# Patient Record
Sex: FEMALE | Race: WHITE | NOT HISPANIC OR LATINO | Employment: OTHER | URBAN - METROPOLITAN AREA
[De-identification: names, ages, dates, MRNs, and addresses within clinical notes are randomized per-mention and may not be internally consistent; named-entity substitution may affect disease eponyms.]

---

## 2017-07-25 ENCOUNTER — THERAPY VISIT (OUTPATIENT)
Dept: PHYSICAL THERAPY | Facility: CLINIC | Age: 82
End: 2017-07-25
Payer: MEDICARE

## 2017-07-25 DIAGNOSIS — M25.552 HIP PAIN, LEFT: Primary | ICD-10-CM

## 2017-07-25 PROCEDURE — 97110 THERAPEUTIC EXERCISES: CPT | Mod: GP | Performed by: PHYSICAL THERAPIST

## 2017-07-25 PROCEDURE — 97161 PT EVAL LOW COMPLEX 20 MIN: CPT | Mod: GP | Performed by: PHYSICAL THERAPIST

## 2017-07-25 PROCEDURE — 97530 THERAPEUTIC ACTIVITIES: CPT | Mod: GP | Performed by: PHYSICAL THERAPIST

## 2017-07-25 PROCEDURE — G8979 MOBILITY GOAL STATUS: HCPCS | Mod: GP | Performed by: PHYSICAL THERAPIST

## 2017-07-25 PROCEDURE — G8978 MOBILITY CURRENT STATUS: HCPCS | Mod: GP | Performed by: PHYSICAL THERAPIST

## 2017-07-25 NOTE — MR AVS SNAPSHOT
"              After Visit Summary   7/25/2017    Susan Fair    MRN: [id number]           Patient Information     Date Of Birth          10/11/1928        Visit Information        Provider Department      7/25/2017 10:10 AM Alissa Lo PT [facility]        Today's Diagnoses     Hip pain, left    -  1       Follow-ups after your visit        Your next 10 appointments already scheduled     Aug 01, 2017 10:10 AM CDT   KERON Extremity with Alissa Lo PT   [facility] ([facility]  )    [address] [place] [id number] [phone]            Aug 08, 2017 10:10 AM CDT   KERON Extremity with Alissa Lo PT   [facility] ([address] [address] [phone]              Who to contact     If you have questions or need follow up information about today's clinic visit or your schedule please contact [facility] PHYSICAL THERAPY directly at [phone].  Normal or non-critical lab and imaging results will be communicated to you by MyChart, letter or phone within 4 business days after the clinic has received the results. If you do not hear from us within 7 days, please contact the clinic through Petpacehart or phone. If you have a critical or abnormal lab result, we will notify you by phone as soon as possible.  Submit refill requests through Experiment or call your pharmacy and they will forward the refill request to us. Please allow 3 business days for your refill to be completed.          Additional Information About Your Visit        MyChart Information     Experiment lets you send messages to your doctor, view your test results, renew your prescriptions, schedule appointments and more. To sign up, go to [email or web address] . Click on \"Log in\" on the left side of the " "screen, which will take you to the Welcome page. Then click on \"Sign up Now\" on the right side of the page.     You will be asked to enter the access code listed below, as well as some personal information. Please follow the directions to create your username and password.     Your access code is: [email or web address]  Expires: 10/23/2017  2:45 PM     Your access code will  in 90 days. If you need help or a new code, please call your [place] clinic or [phone].        Care EveryWhere ID     This is your Care EveryWhere ID. This could be used by other organizations to access your [place] medical records  [id number]         Blood Pressure from Last 3 Encounters:   No data found for BP    Weight from Last 3 Encounters:   No data found for Wt              We Performed the Following     KERON CERT REPORT     KERON Inital Eval Report     PT Eval, Low Complexity (30868)     Therapeutic Activities     Therapeutic Exercises        Primary Care Provider    None Specified       No primary provider on file.        Equal Access to Services     [facility]: Hadii key ku iglesiao Sodel, waaxda luqadaha, qaybta kaalmada adeegyadaria, kelton wall . So [facility] [phone].    ATENCIÓN: Si habla español, tiene a finn disposición servicios gratuitos de asistencia lingüística. Llame al [phone].    We comply with applicable federal civil rights laws and [place] laws. We do not discriminate on the basis of race, color, national origin, age, disability sex, sexual orientation or gender identity.            Thank you!     Thank you for choosing [facility]  for your care. Our goal is always to provide you with excellent care. Hearing back from our patients is one way we can continue to improve our services. Please take a few minutes to complete the written survey that you may receive in the mail after your visit with us. Thank you!             Your Updated " Medication List - Protect others around you: Learn how to safely use, store and throw away your medicines at www.disposemymeds.org.      Notice  As of 7/25/2017  2:45 PM    You have not been prescribed any medications.

## 2017-07-25 NOTE — LETTER
DEPARTMENT OF HEALTH AND HUMAN SERVICES  CENTERS FOR MEDICARE & MEDICAID SERVICES    PLAN/UPDATED PLAN OF PROGRESS FOR OUTPATIENT REHABILITATION    PATIENTS NAME:  Susan Fair     : 10/11/1928    PROVIDER NUMBER:    [id number]    [id number]     PROVIDER NAME: [place] [facility]    MEDICAL RECORD NUMBER: [id number]     START OF CARE DATE:  SOC Date: 17   TYPE:  PT    PRIMARY/TREATMENT DIAGNOSIS: (Pertinent Medical Diagnosis)  Hip pain, left    VISITS FROM START OF CARE:  Rxs Used: 1     [place] [facility] Initial Evaluation    Subjective:  Patient is a 88 year old female presenting with rehab left hip hpi.   Susan Fair is a 88 year old female with a left hip condition.  Condition occurred with:  Repetition/overuse (Pt. has had an onset of L hip bursitis x 1 month w/out incident. Pt. has difficulty with stairs and prolonged amb. Pt. with a hx of polio. No hx of L hip pain.).  Condition occurred: for unknown reasons.  This is a new condition  17.    Patient reports pain:  Greater trochanter.  Radiates to:  No radiation.  Pain is described as aching and is intermittent and reported as 4/10.  Associated symptoms:  Loss of strength. Pain is the same all the time.  Symptoms are exacerbated by walking, ascending stairs and descending stairs and relieved by rest.  Since onset symptoms are gradually improving.  Special tests:  X-ray.  Previous treatment: none.    General health as reported by patient is good.   Patient is a 88 year old female presenting with rehab left hip hpi. Pertinent medical history includes:  High blood pressure, stroke, sleep disorder/apnea and overweight.  Medical allergies: no.  Other surgeries include:  None reported.  Current medications:  High blood pressure medication.  Current occupation is none.               Objective:  Standing Alignment:    Hip deviations alignment: R leg shorter than L due to polio as a  child.    Gait:  Limp on R due to R LE shorter than L from childhood polio  Gait Type:  Antalgic       Flexibility/Screens:   Positive screens:  Hip    Lower Extremity:  Decreased left lower extremity flexibility:Hip Flexors        PATIENTS NAME:  Susan Fair     Hip Evaluation  Hip PROM:    Flexion: Left: 110   Right: 110  Extension: Left: 10   Right: 20  Abduction: Left: 45    Right: 50    Hip Strength:    Flexion:   Left: 4-/5   Pain:    Extension:  Left: 4-/5  Pain:  Abduction:  Left: 4-/5     Pain:    Hip Special Testing:    Left hip positive for the following special tests:  Andres  Left hip negative for the following special tests:  Fadir/Labrum      Hip Palpation:    Left hip tenderness present at:   Greater Trachanter    Assessment/Plan:    Patient is a 88 year old female with left side hip complaints.    Patient has the following significant findings with corresponding treatment plan.                Diagnosis 1:  L hip bursitis  Pain -  self management and education  Decreased ROM/flexibility - manual therapy and therapeutic exercise  Decreased strength - therapeutic exercise and therapeutic activities  Impaired muscle performance - neuro re-education  Decreased function - therapeutic activities    Therapy Evaluation Codes:   1) History comprised of:   Personal factors that impact the plan of care:      Age.    Comorbidity factors that impact the plan of care are:      None.     Medications impacting care: None.  2) Examination of Body Systems comprised of:   Body structures and functions that impact the plan of care:      Hip.   Activity limitations that impact the plan of care are:      Stairs and Walking.  3) Clinical presentation characteristics are:   Stable/Uncomplicated.  4) Decision-Making    Low complexity using standardized patient assessment instrument and/or measureable assessment of functional outcome.  Cumulative Therapy Evaluation is: Low complexity.    Previous and current functional  "limitations:  (See Goal Flow Sheet for this information)    Short term and Long term goals: (See Goal Flow Sheet for this information)     Communication ability:  Patient appears to be able to clearly communicate and understand verbal and written communication and follow directions correctly.  Treatment Explanation - The following has been discussed with the patient:   RX ordered/plan of care  Anticipated outcomes  Possible risks and side effects    PATIENTS NAME:  Susan Fair     This patient would benefit from PT intervention to resume normal activities.   Rehab potential is good.    Frequency:  1 X week, once daily  Duration:  for 4 weeks  Discharge Plan:  Achieve all LTG.  Independent in home treatment program.  Reach maximal therapeutic benefit.    Please refer to the daily flowsheet for treatment today, total treatment time and time spent performing 1:1 timed codes.     Caregiver Signature/Credentials _____________________________ Date ________       Treating Provider: mirian Lo PT   I have reviewed and certified the need for these services and plan of treatment while under my care.      PHYSICIAN'S SIGNATURE:   _________________________________________  Date___________   Magdi Izaguirre    Certification period:  Beginning of Cert date period: 07/25/17 to  End of Cert period date: 10/22/17     Functional Level Progress Report: Please see attached \"Goal Flow sheet for Functional level.\"    ____X____ Continue Services or       ________ DC Services                Service dates: From  SOC Date: 07/25/17 date to present                         "

## 2017-07-25 NOTE — PROGRESS NOTES
[facility] Initial Evaluation          Subjective:    Patient is a 88 year old female presenting with rehab left hip hpi.   Susan Fair is a 88 year old female with a left hip condition.  Condition occurred with:  Repetition/overuse (Pt. has had an onset of L hip bursitis x 1 month w/out incident. Pt. has difficulty with stairs and prolonged amb. Pt. with a hx of polio. No hx of L hip pain.).  Condition occurred: for unknown reasons.  This is a new condition  6-25-17.    Patient reports pain:  Greater trochanter.  Radiates to:  No radiation.  Pain is described as aching and is intermittent and reported as 4/10.  Associated symptoms:  Loss of strength. Pain is the same all the time.  Symptoms are exacerbated by walking, ascending stairs and descending stairs and relieved by rest.  Since onset symptoms are gradually improving.  Special tests:  X-ray.  Previous treatment: none.    General health as reported by patient is good.                                              Objective:    Standing Alignment:            Hip deviations alignment: R leg shorter than L due to polio as a child.        Gait:  Limp on R due to R LE shorter than L from childhood polio  Gait Type:  Antalgic         Flexibility/Screens:   Positive screens:  Hip    Lower Extremity:  Decreased left lower extremity flexibility:Hip Flexors                                                   Hip Evaluation  Hip PROM:    Flexion: Left: 110   Right: 110  Extension: Left: 10   Right: 20  Abduction: Left: 45    Right: 50                    Hip Strength:    Flexion:   Left: 4-/5   Pain:                      Extension:  Left: 4-/5  Pain:  Abduction:  Left: 4-/5     Pain:                  Hip Special Testing:    Left hip positive for the following special tests:  Andres  Left hip negative for the following special tests:  Fadir/Labrum      Hip Palpation:    Left hip tenderness present at:   Greater Trachanter               General      ROS    Assessment/Plan:      Patient is a 88 year old female with left side hip complaints.    Patient has the following significant findings with corresponding treatment plan.                Diagnosis 1:  L hip bursitis  Pain -  self management and education  Decreased ROM/flexibility - manual therapy and therapeutic exercise  Decreased strength - therapeutic exercise and therapeutic activities  Impaired muscle performance - neuro re-education  Decreased function - therapeutic activities    Therapy Evaluation Codes:   1) History comprised of:   Personal factors that impact the plan of care:      Age.    Comorbidity factors that impact the plan of care are:      None.     Medications impacting care: None.  2) Examination of Body Systems comprised of:   Body structures and functions that impact the plan of care:      Hip.   Activity limitations that impact the plan of care are:      Stairs and Walking.  3) Clinical presentation characteristics are:   Stable/Uncomplicated.  4) Decision-Making    Low complexity using standardized patient assessment instrument and/or measureable assessment of functional outcome.  Cumulative Therapy Evaluation is: Low complexity.    Previous and current functional limitations:  (See Goal Flow Sheet for this information)    Short term and Long term goals: (See Goal Flow Sheet for this information)     Communication ability:  Patient appears to be able to clearly communicate and understand verbal and written communication and follow directions correctly.  Treatment Explanation - The following has been discussed with the patient:   RX ordered/plan of care  Anticipated outcomes  Possible risks and side effects  This patient would benefit from PT intervention to resume normal activities.   Rehab potential is good.    Frequency:  1 X week, once daily  Duration:  for 4 weeks  Discharge Plan:  Achieve all LTG.  Independent in home treatment program.  Reach maximal therapeutic  benefit.    Please refer to the daily flowsheet for treatment today, total treatment time and time spent performing 1:1 timed codes.

## 2017-07-28 NOTE — PROGRESS NOTES
Subjective:    Patient is a 88 year old female presenting with rehab left hip hpi.                                      Pertinent medical history includes:  High blood pressure, stroke, sleep disorder/apnea and overweight.  Medical allergies: no.  Other surgeries include:  None reported.  Current medications:  High blood pressure medication.  Current occupation is none.                                    Objective:    System    Physical Exam    General     ROS    Assessment/Plan:

## 2017-08-01 ENCOUNTER — THERAPY VISIT (OUTPATIENT)
Dept: PHYSICAL THERAPY | Facility: CLINIC | Age: 82
End: 2017-08-01
Payer: MEDICARE

## 2017-08-01 DIAGNOSIS — M25.552 HIP PAIN, LEFT: ICD-10-CM

## 2017-08-01 PROCEDURE — 97530 THERAPEUTIC ACTIVITIES: CPT | Mod: GP | Performed by: PHYSICAL THERAPIST

## 2017-08-01 PROCEDURE — 97110 THERAPEUTIC EXERCISES: CPT | Mod: GP | Performed by: PHYSICAL THERAPIST

## 2017-08-01 NOTE — MR AVS SNAPSHOT
"              After Visit Summary   2017    Susan Fari    MRN: [id number]           Patient Information     Date Of Birth          10/11/1928        Visit Information        Provider Department      2017 10:10 AM Alissa Lo PT [facility]        Today's Diagnoses     Hip pain, left           Follow-ups after your visit        Who to contact     If you have questions or need follow up information about today's clinic visit or your schedule please [facility] directly at [phone].  Normal or non-critical lab and imaging results will be communicated to you by TripOvationhart, letter or phone within 4 business days after the clinic has received the results. If you do not hear from us within 7 days, please contact the clinic through TripOvationhart or phone. If you have a critical or abnormal lab result, we will notify you by phone as soon as possible.  Submit refill requests through Aiming or call your pharmacy and they will forward the refill request to us. Please allow 3 business days for your refill to be completed.          Additional Information About Your Visit        MyChart Information     Aiming lets you send messages to your doctor, view your test results, renew your prescriptions, schedule appointments and more. To sign up, go to [email or web address] . Click on \"Log in\" on the left side of the screen, which will take you to the Welcome page. Then click on \"Sign up Now\" on the right side of the page.     You will be asked to enter the access code listed below, as well as some personal information. Please follow the directions to create your username and password.     Your access code is: [email or web address]  Expires: 10/23/2017  2:45 PM     Your access code will  in 90 days. If you need help or a new code, please call your [place] clinic or [phone].        Care EveryWhere ID     This is your " Care EveryWhere ID. This could be used by other organizations to access your [place] medical records  [id number]         Blood Pressure from Last 3 Encounters:   No data found for BP    Weight from Last 3 Encounters:   No data found for Wt              We Performed the Following     Therapeutic Activities     Therapeutic Exercises        Primary Care Provider    None Specified       No primary provider on file.        Equal Access to Services     [facility] : Hadii aad ku hadasho Soomaali, waaxda luqadaha, qaybta kaalmada adealyshayada, kelton barnettdiannajacinda wall . So [facility] [phone].    ATENCIÓN: Si habla español, tiene a finn disposición servicios gratuitos de asistencia lingüística. Llame al [phone].    We comply with applicable federal civil rights laws and [place] laws. We do not discriminate on the basis of race, color, national origin, age, disability sex, sexual orientation or gender identity.            Thank you!     Thank you for choosing [place] [facility] PH[facility]ICAL THERAP[facility]  for your care. Our goal is always to provide you with excellent care. Hearing back from our patients is one way we can continue to improve our services. Please take a few minutes to complete the written survey that you may receive in the mail after your visit with us. Thank you!             Your Updated Medication List - Protect others around you: Learn how to safely use, store and throw away your medicines at www.disposemymeds.org.      Notice  As of 8/1/2017  2:38 PM    You have not been prescribed any medications.

## 2017-08-01 NOTE — PROGRESS NOTES
Subjective:    HPI                    Objective:    System    Physical Exam    General     ROS    Assessment/Plan:      SUBJECTIVE  Subjective changes as noted by pt:   Pt. notes decreasing L hip pain with walking. Stairs still are painful but she does not have to do stairs very often.    Current pain level:  3/10   Changes in function:  Yes (See Goal flowsheet attached for changes in current functional level)     Adverse reaction to treatment or activity:  None    OBJECTIVE  Changes in objective findings:  Strength L hip flex 4/5; abd 4-/5; ext 4-/5.      ASSESSMENT  Susan continues to require intervention to meet STG and LTG's: PT  Patient's symptoms are resolving.  Response to therapy has shown an improvement in  pain level, ROM  and strength  Progress made towards STG/LTG?  Yes (See Goal flowsheet attached for updates on achievement of STG and LTG)    PLAN  Current treatment program is being advanced to more complex exercises.    PTA/ATC plan:  N/A    Please refer to the daily flowsheet for treatment today, total treatment time and time spent performing 1:1 timed codes.

## 2021-05-25 ENCOUNTER — RECORDS - HEALTHEAST (OUTPATIENT)
Dept: ADMINISTRATIVE | Facility: CLINIC | Age: 86
End: 2021-05-25

## 2021-06-03 ENCOUNTER — RECORDS - HEALTHEAST (OUTPATIENT)
Dept: ADMINISTRATIVE | Facility: CLINIC | Age: 86
End: 2021-06-03

## 2024-09-30 ENCOUNTER — APPOINTMENT (OUTPATIENT)
Dept: CT IMAGING | Facility: HOSPITAL | Age: 89
End: 2024-09-30
Attending: EMERGENCY MEDICINE
Payer: COMMERCIAL

## 2024-09-30 ENCOUNTER — HOSPITAL ENCOUNTER (OUTPATIENT)
Facility: HOSPITAL | Age: 89
Setting detail: OBSERVATION
Discharge: HOME | End: 2024-10-02
Attending: EMERGENCY MEDICINE | Admitting: INTERNAL MEDICINE
Payer: MEDICARE

## 2024-09-30 ENCOUNTER — ANESTHESIA EVENT (OUTPATIENT)
Dept: SURGERY | Facility: HOSPITAL | Age: 89
End: 2024-09-30
Payer: COMMERCIAL

## 2024-09-30 DIAGNOSIS — R10.33 PERIUMBILICAL ABDOMINAL PAIN: ICD-10-CM

## 2024-09-30 DIAGNOSIS — K63.89 COLONIC MASS: ICD-10-CM

## 2024-09-30 PROBLEM — E78.00 PURE HYPERCHOLESTEROLEMIA: Status: ACTIVE | Noted: 2024-09-30

## 2024-09-30 PROBLEM — I10 ESSENTIAL HYPERTENSION: Status: ACTIVE | Noted: 2024-09-30

## 2024-09-30 LAB
ALBUMIN SERPL BCG-MCNC: 3.5 G/DL (ref 3.5–5.2)
ALBUMIN UR-MCNC: 10 MG/DL
ALP SERPL-CCNC: 75 U/L (ref 40–150)
ALT SERPL W P-5'-P-CCNC: 14 U/L (ref 0–50)
ANION GAP SERPL CALCULATED.3IONS-SCNC: 11 MMOL/L (ref 7–15)
APPEARANCE UR: CLEAR
AST SERPL W P-5'-P-CCNC: 17 U/L (ref 0–45)
BASOPHILS # BLD AUTO: 0 10E3/UL (ref 0–0.2)
BASOPHILS NFR BLD AUTO: 0 %
BILIRUB SERPL-MCNC: 0.5 MG/DL
BILIRUB UR QL STRIP: NEGATIVE
BUN SERPL-MCNC: 11.5 MG/DL (ref 8–23)
CALCIUM SERPL-MCNC: 8.6 MG/DL (ref 8.8–10.4)
CHLORIDE SERPL-SCNC: 100 MMOL/L (ref 98–107)
COLOR UR AUTO: YELLOW
CREAT SERPL-MCNC: 0.76 MG/DL (ref 0.51–0.95)
EGFRCR SERPLBLD CKD-EPI 2021: 72 ML/MIN/1.73M2
EOSINOPHIL # BLD AUTO: 0 10E3/UL (ref 0–0.7)
EOSINOPHIL NFR BLD AUTO: 0 %
ERYTHROCYTE [DISTWIDTH] IN BLOOD BY AUTOMATED COUNT: 13.9 % (ref 10–15)
GLUCOSE SERPL-MCNC: 125 MG/DL (ref 70–99)
GLUCOSE UR STRIP-MCNC: NEGATIVE MG/DL
HCO3 SERPL-SCNC: 26 MMOL/L (ref 22–29)
HCT VFR BLD AUTO: 32 % (ref 35–47)
HGB BLD-MCNC: 10.1 G/DL (ref 11.7–15.7)
HGB UR QL STRIP: NEGATIVE
IMM GRANULOCYTES # BLD: 0.1 10E3/UL
IMM GRANULOCYTES NFR BLD: 1 %
KETONES UR STRIP-MCNC: NEGATIVE MG/DL
LEUKOCYTE ESTERASE UR QL STRIP: NEGATIVE
LIPASE SERPL-CCNC: 20 U/L (ref 13–60)
LYMPHOCYTES # BLD AUTO: 1.5 10E3/UL (ref 0.8–5.3)
LYMPHOCYTES NFR BLD AUTO: 9 %
MAGNESIUM SERPL-MCNC: 2 MG/DL (ref 1.7–2.3)
MCH RBC QN AUTO: 29.1 PG (ref 26.5–33)
MCHC RBC AUTO-ENTMCNC: 31.6 G/DL (ref 31.5–36.5)
MCV RBC AUTO: 92 FL (ref 78–100)
MONOCYTES # BLD AUTO: 0.4 10E3/UL (ref 0–1.3)
MONOCYTES NFR BLD AUTO: 3 %
MUCOUS THREADS #/AREA URNS LPF: PRESENT /LPF
NEUTROPHILS # BLD AUTO: 13.8 10E3/UL (ref 1.6–8.3)
NEUTROPHILS NFR BLD AUTO: 87 %
NITRATE UR QL: NEGATIVE
NRBC # BLD AUTO: 0 10E3/UL
NRBC BLD AUTO-RTO: 0 /100
PH UR STRIP: 6.5 [PH] (ref 5–7)
PLATELET # BLD AUTO: 303 10E3/UL (ref 150–450)
POTASSIUM SERPL-SCNC: 4.2 MMOL/L (ref 3.4–5.3)
PROT SERPL-MCNC: 6.5 G/DL (ref 6.4–8.3)
RADIOLOGIST FLAGS: ABNORMAL
RBC # BLD AUTO: 3.47 10E6/UL (ref 3.8–5.2)
RBC URINE: 1 /HPF
SODIUM SERPL-SCNC: 137 MMOL/L (ref 135–145)
SP GR UR STRIP: 1.02 (ref 1–1.03)
SQUAMOUS EPITHELIAL: 2 /HPF
UROBILINOGEN UR STRIP-MCNC: <2 MG/DL
WBC # BLD AUTO: 15.9 10E3/UL (ref 4–11)
WBC URINE: 2 /HPF

## 2024-09-30 PROCEDURE — 250N000011 HC RX IP 250 OP 636: Performed by: INTERNAL MEDICINE

## 2024-09-30 PROCEDURE — 81001 URINALYSIS AUTO W/SCOPE: CPT | Performed by: EMERGENCY MEDICINE

## 2024-09-30 PROCEDURE — 83690 ASSAY OF LIPASE: CPT | Performed by: EMERGENCY MEDICINE

## 2024-09-30 PROCEDURE — 99285 EMERGENCY DEPT VISIT HI MDM: CPT | Mod: 25

## 2024-09-30 PROCEDURE — 36415 COLL VENOUS BLD VENIPUNCTURE: CPT | Performed by: EMERGENCY MEDICINE

## 2024-09-30 PROCEDURE — 99222 1ST HOSP IP/OBS MODERATE 55: CPT | Mod: AI | Performed by: INTERNAL MEDICINE

## 2024-09-30 PROCEDURE — 999N000157 HC STATISTIC RCP TIME EA 10 MIN

## 2024-09-30 PROCEDURE — 250N000013 HC RX MED GY IP 250 OP 250 PS 637: Performed by: PHYSICIAN ASSISTANT

## 2024-09-30 PROCEDURE — 258N000003 HC RX IP 258 OP 636: Performed by: EMERGENCY MEDICINE

## 2024-09-30 PROCEDURE — 96374 THER/PROPH/DIAG INJ IV PUSH: CPT

## 2024-09-30 PROCEDURE — G0378 HOSPITAL OBSERVATION PER HR: HCPCS

## 2024-09-30 PROCEDURE — 250N000011 HC RX IP 250 OP 636: Performed by: EMERGENCY MEDICINE

## 2024-09-30 PROCEDURE — 83735 ASSAY OF MAGNESIUM: CPT | Performed by: INTERNAL MEDICINE

## 2024-09-30 PROCEDURE — 80053 COMPREHEN METABOLIC PANEL: CPT | Performed by: EMERGENCY MEDICINE

## 2024-09-30 PROCEDURE — 96361 HYDRATE IV INFUSION ADD-ON: CPT

## 2024-09-30 PROCEDURE — 74177 CT ABD & PELVIS W/CONTRAST: CPT | Mod: MG

## 2024-09-30 PROCEDURE — 96375 TX/PRO/DX INJ NEW DRUG ADDON: CPT

## 2024-09-30 PROCEDURE — 85025 COMPLETE CBC W/AUTO DIFF WBC: CPT | Performed by: EMERGENCY MEDICINE

## 2024-09-30 PROCEDURE — 250N000012 HC RX MED GY IP 250 OP 636 PS 637: Performed by: INTERNAL MEDICINE

## 2024-09-30 PROCEDURE — 250N000013 HC RX MED GY IP 250 OP 250 PS 637: Performed by: INTERNAL MEDICINE

## 2024-09-30 PROCEDURE — 258N000003 HC RX IP 258 OP 636: Performed by: INTERNAL MEDICINE

## 2024-09-30 RX ORDER — MAGNESIUM CARB/ALUMINUM HYDROX 105-160MG
296 TABLET,CHEWABLE ORAL ONCE
Status: COMPLETED | OUTPATIENT
Start: 2024-10-01 | End: 2024-10-01

## 2024-09-30 RX ORDER — POLYETHYLENE GLYCOL 3350 17 G/17G
238 POWDER, FOR SOLUTION ORAL ONCE
Status: COMPLETED | OUTPATIENT
Start: 2024-09-30 | End: 2024-09-30

## 2024-09-30 RX ORDER — ACETAMINOPHEN 650 MG/1
650 SUPPOSITORY RECTAL EVERY 4 HOURS PRN
Status: DISCONTINUED | OUTPATIENT
Start: 2024-09-30 | End: 2024-10-01

## 2024-09-30 RX ORDER — FAMOTIDINE 20 MG/1
20 TABLET, FILM COATED ORAL AT BEDTIME
Status: ON HOLD | COMMUNITY

## 2024-09-30 RX ORDER — NALOXONE HYDROCHLORIDE 0.4 MG/ML
0.4 INJECTION, SOLUTION INTRAMUSCULAR; INTRAVENOUS; SUBCUTANEOUS
Status: DISCONTINUED | OUTPATIENT
Start: 2024-09-30 | End: 2024-10-02 | Stop reason: HOSPADM

## 2024-09-30 RX ORDER — LIDOCAINE 40 MG/G
CREAM TOPICAL
Status: DISCONTINUED | OUTPATIENT
Start: 2024-09-30 | End: 2024-10-02 | Stop reason: HOSPADM

## 2024-09-30 RX ORDER — PREDNISONE 5 MG/1
5 TABLET ORAL DAILY
Status: ON HOLD | COMMUNITY
Start: 2024-09-30 | End: 2024-10-06

## 2024-09-30 RX ORDER — PROCHLORPERAZINE 25 MG
12.5 SUPPOSITORY, RECTAL RECTAL EVERY 12 HOURS PRN
Status: DISCONTINUED | OUTPATIENT
Start: 2024-09-30 | End: 2024-10-02 | Stop reason: HOSPADM

## 2024-09-30 RX ORDER — AMLODIPINE BESYLATE 5 MG/1
10 TABLET ORAL DAILY
Status: DISCONTINUED | OUTPATIENT
Start: 2024-09-30 | End: 2024-10-02 | Stop reason: HOSPADM

## 2024-09-30 RX ORDER — PREDNISONE 2.5 MG/1
2.5 TABLET ORAL DAILY
Status: ON HOLD | COMMUNITY
Start: 2024-10-07 | End: 2024-10-13

## 2024-09-30 RX ORDER — MORPHINE SULFATE 4 MG/ML
4 INJECTION, SOLUTION INTRAMUSCULAR; INTRAVENOUS
Status: DISCONTINUED | OUTPATIENT
Start: 2024-09-30 | End: 2024-10-01

## 2024-09-30 RX ORDER — AMOXICILLIN 250 MG
1 CAPSULE ORAL 2 TIMES DAILY PRN
Status: DISCONTINUED | OUTPATIENT
Start: 2024-09-30 | End: 2024-10-02 | Stop reason: HOSPADM

## 2024-09-30 RX ORDER — OXYCODONE HYDROCHLORIDE 5 MG/1
5 TABLET ORAL EVERY 4 HOURS PRN
Status: DISCONTINUED | OUTPATIENT
Start: 2024-09-30 | End: 2024-10-01

## 2024-09-30 RX ORDER — PROCHLORPERAZINE MALEATE 5 MG
5 TABLET ORAL EVERY 6 HOURS PRN
Status: DISCONTINUED | OUTPATIENT
Start: 2024-09-30 | End: 2024-10-02 | Stop reason: HOSPADM

## 2024-09-30 RX ORDER — ONDANSETRON 2 MG/ML
4-8 INJECTION INTRAMUSCULAR; INTRAVENOUS EVERY 6 HOURS PRN
Status: DISCONTINUED | OUTPATIENT
Start: 2024-09-30 | End: 2024-10-02 | Stop reason: HOSPADM

## 2024-09-30 RX ORDER — ACETAMINOPHEN 325 MG/1
650 TABLET ORAL EVERY 4 HOURS PRN
Status: DISCONTINUED | OUTPATIENT
Start: 2024-09-30 | End: 2024-10-01

## 2024-09-30 RX ORDER — PREDNISONE 5 MG/1
5 TABLET ORAL DAILY
Status: DISCONTINUED | OUTPATIENT
Start: 2024-09-30 | End: 2024-10-02 | Stop reason: HOSPADM

## 2024-09-30 RX ORDER — PANTOPRAZOLE SODIUM 40 MG/1
40 TABLET, DELAYED RELEASE ORAL
Status: DISCONTINUED | OUTPATIENT
Start: 2024-09-30 | End: 2024-10-02 | Stop reason: HOSPADM

## 2024-09-30 RX ORDER — NALOXONE HYDROCHLORIDE 0.4 MG/ML
0.2 INJECTION, SOLUTION INTRAMUSCULAR; INTRAVENOUS; SUBCUTANEOUS
Status: DISCONTINUED | OUTPATIENT
Start: 2024-09-30 | End: 2024-10-02 | Stop reason: HOSPADM

## 2024-09-30 RX ORDER — AMOXICILLIN 250 MG
2 CAPSULE ORAL 2 TIMES DAILY PRN
Status: DISCONTINUED | OUTPATIENT
Start: 2024-09-30 | End: 2024-10-02 | Stop reason: HOSPADM

## 2024-09-30 RX ORDER — CALCIUM CARBONATE 500 MG/1
1000 TABLET, CHEWABLE ORAL 4 TIMES DAILY PRN
Status: DISCONTINUED | OUTPATIENT
Start: 2024-09-30 | End: 2024-10-02 | Stop reason: HOSPADM

## 2024-09-30 RX ORDER — HYDROMORPHONE HCL IN WATER/PF 6 MG/30 ML
0.2 PATIENT CONTROLLED ANALGESIA SYRINGE INTRAVENOUS EVERY 6 HOURS PRN
Status: DISCONTINUED | OUTPATIENT
Start: 2024-09-30 | End: 2024-10-01

## 2024-09-30 RX ORDER — AMLODIPINE BESYLATE 10 MG/1
10 TABLET ORAL DAILY
Status: ON HOLD | COMMUNITY

## 2024-09-30 RX ORDER — BISACODYL 5 MG
10 TABLET, DELAYED RELEASE (ENTERIC COATED) ORAL ONCE
Status: COMPLETED | OUTPATIENT
Start: 2024-09-30 | End: 2024-09-30

## 2024-09-30 RX ORDER — ONDANSETRON 2 MG/ML
8 INJECTION INTRAMUSCULAR; INTRAVENOUS ONCE
Status: COMPLETED | OUTPATIENT
Start: 2024-09-30 | End: 2024-09-30

## 2024-09-30 RX ORDER — LOSARTAN POTASSIUM AND HYDROCHLOROTHIAZIDE 25; 100 MG/1; MG/1
1 TABLET ORAL DAILY
Status: ON HOLD | COMMUNITY

## 2024-09-30 RX ORDER — IOPAMIDOL 755 MG/ML
90 INJECTION, SOLUTION INTRAVASCULAR ONCE
Status: COMPLETED | OUTPATIENT
Start: 2024-09-30 | End: 2024-09-30

## 2024-09-30 RX ORDER — ONDANSETRON 4 MG/1
4 TABLET, ORALLY DISINTEGRATING ORAL EVERY 12 HOURS PRN
Status: ON HOLD | COMMUNITY

## 2024-09-30 RX ORDER — SODIUM CHLORIDE, SODIUM LACTATE, POTASSIUM CHLORIDE, CALCIUM CHLORIDE 600; 310; 30; 20 MG/100ML; MG/100ML; MG/100ML; MG/100ML
INJECTION, SOLUTION INTRAVENOUS CONTINUOUS
Status: DISCONTINUED | OUTPATIENT
Start: 2024-09-30 | End: 2024-10-01

## 2024-09-30 RX ORDER — PREDNISONE 5 MG/1
5 TABLET ORAL DAILY
Status: DISCONTINUED | OUTPATIENT
Start: 2024-09-30 | End: 2024-09-30

## 2024-09-30 RX ORDER — CLOPIDOGREL BISULFATE 75 MG/1
75 TABLET ORAL DAILY
Status: ON HOLD | COMMUNITY

## 2024-09-30 RX ORDER — FERROUS SULFATE 325(65) MG
325 TABLET ORAL DAILY
Status: ON HOLD | COMMUNITY

## 2024-09-30 RX ADMIN — ACETAMINOPHEN 650 MG: 325 TABLET ORAL at 18:39

## 2024-09-30 RX ADMIN — AMLODIPINE BESYLATE 10 MG: 5 TABLET ORAL at 12:09

## 2024-09-30 RX ADMIN — HYDROMORPHONE HYDROCHLORIDE 0.2 MG: 0.2 INJECTION, SOLUTION INTRAMUSCULAR; INTRAVENOUS; SUBCUTANEOUS at 18:41

## 2024-09-30 RX ADMIN — BISACODYL 10 MG: 5 TABLET, COATED ORAL at 15:29

## 2024-09-30 RX ADMIN — ACETAMINOPHEN 650 MG: 325 TABLET ORAL at 22:42

## 2024-09-30 RX ADMIN — IOPAMIDOL 90 ML: 755 INJECTION, SOLUTION INTRAVENOUS at 09:49

## 2024-09-30 RX ADMIN — POLYETHYLENE GLYCOL 3350 238 G: 17 POWDER, FOR SOLUTION ORAL at 16:10

## 2024-09-30 RX ADMIN — SODIUM CHLORIDE, POTASSIUM CHLORIDE, SODIUM LACTATE AND CALCIUM CHLORIDE 1000 ML: 600; 310; 30; 20 INJECTION, SOLUTION INTRAVENOUS at 08:12

## 2024-09-30 RX ADMIN — PREDNISONE 5 MG: 5 TABLET ORAL at 12:57

## 2024-09-30 RX ADMIN — PANTOPRAZOLE SODIUM 40 MG: 40 TABLET, DELAYED RELEASE ORAL at 12:10

## 2024-09-30 RX ADMIN — SODIUM CHLORIDE, POTASSIUM CHLORIDE, SODIUM LACTATE AND CALCIUM CHLORIDE: 600; 310; 30; 20 INJECTION, SOLUTION INTRAVENOUS at 12:11

## 2024-09-30 RX ADMIN — ONDANSETRON 8 MG: 2 INJECTION INTRAMUSCULAR; INTRAVENOUS at 08:02

## 2024-09-30 ASSESSMENT — ACTIVITIES OF DAILY LIVING (ADL)
ADLS_ACUITY_SCORE: 35
ADLS_ACUITY_SCORE: 26
ADLS_ACUITY_SCORE: 35
ADLS_ACUITY_SCORE: 36
ADLS_ACUITY_SCORE: 35
ADLS_ACUITY_SCORE: 35
ADLS_ACUITY_SCORE: 26
ADLS_ACUITY_SCORE: 35
ADLS_ACUITY_SCORE: 26
ADLS_ACUITY_SCORE: 36
ADLS_ACUITY_SCORE: 35
ADLS_ACUITY_SCORE: 35
ADLS_ACUITY_SCORE: 26

## 2024-09-30 ASSESSMENT — COLUMBIA-SUICIDE SEVERITY RATING SCALE - C-SSRS
2. HAVE YOU ACTUALLY HAD ANY THOUGHTS OF KILLING YOURSELF IN THE PAST MONTH?: NO
6. HAVE YOU EVER DONE ANYTHING, STARTED TO DO ANYTHING, OR PREPARED TO DO ANYTHING TO END YOUR LIFE?: NO
1. IN THE PAST MONTH, HAVE YOU WISHED YOU WERE DEAD OR WISHED YOU COULD GO TO SLEEP AND NOT WAKE UP?: NO

## 2024-09-30 NOTE — PROGRESS NOTES
Colon and Rectal Surgery  Brief Consult Note:    89 year-old female admitted to ED for evaluation of fall with incidental findings of irregular masslike wall thickening of the hepatic flexure, concerning for neoplasm. Reports abdominal pan and nausea last evening, now improving. No evidence of acute obstruction or perforation, no indication for urgent surgical intervention at this time.    Plan:    -No indication for urgent surgical intervention at this time  -Recommend GI consultation for work-up with scope and biopsy  -Would need complete staging if malignancy confired    Formal consult note to follow. Discussed with YULIET Law-C  [phone]

## 2024-09-30 NOTE — PLAN OF CARE
"  Problem: Adult Inpatient Plan of Care  Goal: Patient-Specific Goal (Individualized)  Description: You can add care plan individualizations to a care plan. Examples of Individualization might be:  \"Parent requests to be called daily at 9am for status\", \"I have a hard time hearing out of my right ear\", or \"Do not touch me to wake me up as it startles  me\".  9/30/2024 1508 by Rola Bradshaw RN  Outcome: Progressing  9/30/2024 1508 by Rola Bradshaw RN  Outcome: Progressing     Problem: Adult Inpatient Plan of Care  Goal: Optimal Comfort and Wellbeing  9/30/2024 1508 by Rola Bradshaw, RN  Outcome: Progressing  9/30/2024 1508 by Rola Bradshaw RN  Outcome: Progressing   Goal Outcome Evaluation:      Plan of Care Reviewed With: patient      VSS. Patient is alert and orientated, but forgetful, bed alarm in place. Patient complaining of minimum pain, reposition with pillow. Patient up to commode with assist of one person. Continue to monitor.           "

## 2024-09-30 NOTE — CONSULTS
GI CONSULT NOTE      Name: Susan Fair  Medical Record #: [id number]  YOB: 1928  Date of Admission: 9/30/2024  Date/Time: 9/30/2024/2:50 PM     CHIEF COMPLAINT: abdominal pain     HISTORY OF PRESENT ILLNESS: We were asked to see Susan Fair by Dr Castillo for evaluation of colon mass/abdominal pain. Susan Fair is a 89 year old year old female with history of hypertension, hyperlipidemia, obstructive sleep apnea, and colon polyps who presented to the ER earlier today after sustaining a fall. She describes having a couple week history of periumbilical abdominal pain, generalized weakness, and nausea.  Laboratory evaluation revealed leukocytosis with white blood cell count of 15.9.  Hemoglobin was 10 with MCV in the 90s which appears chronic.  CT abdomen and pelvis with IV contrast showed a probable colorectal cancer at the hepatic flexure and prominent mesenteric lymph nodes adjacent to the SMV.  Also noted was a 4.2 cm left adnexal cystic lesion is incompletely characterized.    The patient describes having a 2-week history of intermittent periumbilical pain.  She has not had altered bowel habits and is typically passing 1-2 stools per day.  She has not noted any bright red blood in her stool but does note her stools to be dark in color which could be related to her iron supplement.  She also reports taking Pepto-Bismol frequently for nausea.  She has not had any vomiting.  She denies having weight loss.  No fevers.    The patient was suspected of having polymyalgia rheumatica and was started on prednisone.  Patient has been seen by rheumatology who does not believe PMR is a possibility and therefore she is tapering off prednisone.    The patient lives independently and cooks most of her meals.  She does get 10 meals per month at her senior living facility.  The patient's last colonoscopy was April 27, 2007.  2 tubular adenomas were removed.    The patient is present with her daughter and grandson.  She  "tells me she would like to proceed with colonoscopy for further evaluation of the colon mass.     REVIEW OF SYSTEMS (ROS): Complete review of systems negative other than listed in HPI.    PAST MEDICAL HISTORY:  No past medical history on file.     FAMILY HISTORY:  No family history on file.    SOCIAL HISTORY:        MEDICATIONS PRIOR TO ADMISSION:   Medications Prior to Admission   Medication Sig Dispense Refill Last Dose    amLODIPine (NORVASC) 10 MG tablet Take 10 mg by mouth daily.   9/29/2024 at PM    calcium citrate-vitamin D (CITRACAL) 200-6.25 MG-MCG TABS per tablet Take 1 tablet by mouth daily.   9/29/2024    clopidogrel (PLAVIX) 75 MG tablet Take 75 mg by mouth daily.   9/29/2024 at PM    famotidine (PEPCID) 20 MG tablet Take 20 mg by mouth at bedtime.   9/29/2024 at PM    ferrous sulfate (FEROSUL) 325 (65 Fe) MG tablet Take 325 mg by mouth daily.   9/29/2024    losartan-hydrochlorothiazide (HYZAAR) 100-25 MG tablet Take 1 tablet by mouth daily.   9/29/2024 at PM    ondansetron (ZOFRAN ODT) 4 MG ODT tab Take 4 mg by mouth every 12 hours as needed for nausea.   9/29/2024 at PM    [START ON 10/7/2024] predniSONE (DELTASONE) 2.5 MG tablet Take 2.5 mg by mouth daily.       predniSONE (DELTASONE) 5 MG tablet Take 5 mg by mouth daily.             ALLERGIES: Patient has no known allergies.    PHYSICAL EXAM:    /64 (BP Location: Left arm)   Pulse 93   Temp 98.9  F (37.2  C) (Oral)   Resp 18   Ht 1.448 m (4' 9\")   Wt 81.6 kg (180 lb)   SpO2 91%   BMI 38.95 kg/m      GENERAL: Pleasant, no obvious distress  NECK: Supple without adenopathy  EYES: No scleral icterus  LUNGS: Clear to auscultation bilaterally  HEART: S1S2, no lower extremity edema  ABDOMEN: Non-distended. Positive bowel sounds. Soft, non-tender, no guarding/rebound/mass  MUSKULOSKELETAL:  Warm and well perfused, moves all extremities well  SKIN: No jaundice  NEUROLOGIC: Alert and oriented  PSYCHIATRIC: Normal affect    LAB DATA:  CMP Results: "   Recent Labs   Lab Test 09/30/24  0759      POTASSIUM 4.2   CHLORIDE 100   CO2 26   ANIONGAP 11   *   BUN 11.5   CR 0.76   BILITOTAL 0.5   ALKPHOS 75   ALT 14   AST 17      CBC  Recent Labs   Lab 09/30/24  0759   WBC 15.9*   RBC 3.47*   HGB 10.1*   HCT 32.0*   MCV 92   MCH 29.1   MCHC 31.6   RDW 13.9        INRNo lab results found in last 7 days.   Lipase   Date Value Ref Range Status   09/30/2024 20 13 - 60 U/L Final       IMAGING:  EXAM: CT ABDOMEN PELVIS W CONTRAST  LOCATION: [facility]  DATE: 9/30/2024     INDICATION: Periumbilical abdominal pain  COMPARISON: None.  TECHNIQUE: CT scan of the abdomen and pelvis was performed following injection of IV contrast. Multiplanar reformats were obtained. Dose reduction techniques were used.  CONTRAST: IsoVue 370 90mL     FINDINGS:   LOWER CHEST: Dependent atelectasis.     HEPATOBILIARY: Normal liver contours. Subcentimeter low-attenuation lesion in segment 5 is incompletely characterized though favored to represent a benign cyst/hemangioma. Gallbladder is unremarkable. No biliary ductal dilation.     PANCREAS: Normal.     SPLEEN: Normal.     ADRENAL GLANDS: Normal.     KIDNEYS/BLADDER: The kidneys enhance symmetrically. No urolithiasis or hydronephrosis. Trabeculated bladder contours are likely sequela of chronic outflow compromise.     BOWEL: There is annular irregular thickening of an approximately 8 cm segment of the ascending colon at the hepatic flexure with adjacent stranding/inflammation. No abscess or organized fluid collection.     Remainder of small and large bowel is normal in caliber. Colonic diverticulosis. Normal appendix.     LYMPH NODES: 1.4 cm rounded low-attenuation mesenteric lymph node adjacent to the SMV (3/76).     VASCULATURE: Abdominal aorta is nonaneurysmal with moderate atheromatous disease.     PELVIC ORGANS: Uterus is present. 4.2 x 3.5 x 3.6 cm cystic lesion in the left adnexa. Trace amount of  adjacent free fluid.     MUSCULOSKELETAL: Degenerative disc disease and facet osteoarthritis throughout the visualized spine. No worrisome bone lesions.                                                                      IMPRESSION:   1.  Irregular masslike wall thickening of the hepatic flexure with adjacent inflammatory changes, suspicious for primary colonic neoplasm. No abscess or perforation. GI consultation for consideration of endoscopy is recommended.  2.  Prominent mesenteric lymph node adjacent to the SMV, suspicious for mora disease.  3.  No convincing evidence of solid organ metastasis in the abdomen or pelvis.  4.  4.2 cm left adnexal cystic lesion, which is incompletely characterized. Recommend nonemergent follow-up pelvic ultrasound to further assess (if within patient goals of care).           [Urgent Result: Colonic mass]     Finding was identified on 9/30/2024 9:59 AM CDT.      Dr. Us was contacted by me on 9/30/2024 10:23 AM CDT and verbalized understanding of the result.  ASSESSMENT:  Abnormal CT abdomen and pelvis with masslike wall thickening of the hepatic flexure, concerning for colonic neoplasm--This is a 89 year old year old female with history of hypertension, hyperlipidemia, obstructive sleep apnea, and colon polyps who presented to the ER after sustaining a fall (no traumatic injuries). She describes having a couple week history of generalized weakness and intermittent periumbillical pain. Labs show chronic normocytic anemia and leukocytosis which I suspect is related to recent Prednisone (prescribed for possible PMR which has been excluded). CT abdomen and pelvis with iv contrast shows irregular masslike wall thickening of the hepatic flexure, suspicious for primary colonic neoplasm. Additionally, there is a 4.2cm left adnexal cystic lesion.   The patient would like to proceed with colonoscopy for further evaluation of suspected colonic neoplasm.     PLAN:  Further evaluation of  the 4.2 cm left adnexal cystic lesion as seen on CT per primary team.  Clear liquids today. Will start colonoscopy prep this afternoon.   Plan for colonoscopy in OR with MAC 10/1/24.   Check Mg and K in AM-- replacement per primary team.     Total time spent on this encounter=45 minutes   Discussed with Dr. Valentin Weber PA-C  Thank you for the opportunity to participate in the care of this patient.   Please feel free to call me with any questions or concerns.  Phone number [phone].

## 2024-09-30 NOTE — PHARMACY-ADMISSION MEDICATION HISTORY
Pharmacist Admission Medication History    Admission medication history is complete. The information provided in this note is only as accurate as the sources available at the time of the update.    Information Source(s): Patient and Family member via in-person    Pertinent Information: Patient has not had any medications yet today.   Patient is in the middle of a long prednisone taper per rheumatology. Today 9/30 is the first day of 5mg.   - Taper your prednisone to 10 mg for 5 days then 5 mg for 7 days then 2.5 mg for 7 days then stop.       Changes made to PTA medication list:  Added: All  Deleted: None  Changed: None    Allergies reviewed with patient and updates made in EHR: yes    Medication History Completed By: Wyatt Painting RPH 9/30/2024 11:09 AM    PTA Med List   Medication Sig Last Dose    amLODIPine (NORVASC) 10 MG tablet Take 10 mg by mouth daily. 9/29/2024 at PM    calcium citrate-vitamin D (CITRACAL) 200-6.25 MG-MCG TABS per tablet Take 1 tablet by mouth daily. 9/29/2024    clopidogrel (PLAVIX) 75 MG tablet Take 75 mg by mouth daily. 9/29/2024 at PM    famotidine (PEPCID) 20 MG tablet Take 20 mg by mouth at bedtime. 9/29/2024 at PM    ferrous sulfate (FEROSUL) 325 (65 Fe) MG tablet Take 325 mg by mouth daily. 9/29/2024    losartan-hydrochlorothiazide (HYZAAR) 100-25 MG tablet Take 1 tablet by mouth daily. 9/29/2024 at PM    ondansetron (ZOFRAN ODT) 4 MG ODT tab Take 4 mg by mouth every 12 hours as needed for nausea. 9/29/2024 at PM    [START ON 10/7/2024] predniSONE (DELTASONE) 2.5 MG tablet Take 2.5 mg by mouth daily.     predniSONE (DELTASONE) 5 MG tablet Take 5 mg by mouth daily.

## 2024-09-30 NOTE — ED PROVIDER NOTES
"EMERGENCY DEPARTMENT ENCOUNTER      NAME: Susan Fair  AGE: 89 year old female  YOB: 1928  MRN: [id number]  EVALUATION DATE & TIME: 9/30/2024  6:56 AM    PCP: No primary care provider on file.    ED PROVIDER: Cinda Us MD    Chief Complaint   Patient presents with    Fall         FINAL IMPRESSION:  1. Colonic mass    2. Periumbilical abdominal pain          ED COURSE & MEDICAL DECISION MAKING:    Pertinent Labs & Imaging studies reviewed. (See chart for details)  89 year old female with history of HTN, HLD, JAGDISH and cerebrovascular disease who presents to the Emergency Department for evaluation of fall.  It sounds like her fall is due to weakness, was using her walker, and fell on carpeted floor.  With regards to the fall itself, does not appear there has been any trauma.  No head injury, midline CT or L-spine tenderness to warrant any imaging.  However on examination she is minimally tachycardic, her mucous membranes are dry and she has complained of 2 weeks of periumbilical/suprapubic abdominal pain with nausea.  She has periumbilical abdominal pain on examination.  Differential includes UTI, cystitis, diverticulitis, colitis, appendicitis and hepatobiliary pathology less likely.    Patient placed on monitor, IV established and blood obtained.  Given 1 L normal saline bolus, 8 mg Zofran.  CBC, CMP, lipase WBC of 15.9.  Chronic anemia.  Urinalysis unremarkable.  CT abdomen pelvis shows evidence of likely new colorectal cancer at the hepatic flexure with likely lymph node metastasis.  Findings were discussed with patient and family at bedside.  Though she is 89 she is actually fairly independent and functional.  Lives in a senior building but independently, walks with walker.  Patient states that she \"cannot live with this pain\".  And would like to consider aggressive management.  Will admit for pain control and colorectal surgery consultation discussed with patient with options.  If she does want " to be aggressive may need to proceed with GI consultation for biopsy.       ED Course as of 09/30/24 1115   Mon Sep 30, 2024   0821 WBC(!): 15.9   0821 Hemoglobin(!): 10.1  Baseline per July labs through    0953 CT Abdomen Pelvis w Contrast  CT independently interpreted by myself the right-sided inflammatory changes of the bowel   1021 Spoke w [facility] - suspect colon ca at hepatic flexure annular mass w adjacent inflammatory changes, ? Lymph nod met.    1045 Spoke w YULIET Serra with colorectal   1058 Admitted to Dr. Castillo       Medical Decision Making    History:  Supplemental history from: Family Member/Significant Other and EMS  External Record(s) reviewed: Outpatient Record: 9/25/2024 rheumatology visit    Work Up:  Chart documentation includes differential considered and any EKGs or imaging independently interpreted by provider, see MDM  In additional to work up documented, I considered the following work up: see MDM    External consultation:  Discussion of management with another provider: Colorectal, hospitalist    Complicating factors:  Care impacted by chronic illness: Cerebrovascular Disease, Hyperlipidemia, and Hypertension  Care affected by social determinants of health: Access to Medical Care referred to ED by care facility    Disposition considerations: Admit.    Not Applicable      At the conclusion of the encounter I discussed the results of all of the tests and the disposition. The questions were answered. The patient or family acknowledged understanding and was agreeable with the care plan.      MEDICATIONS GIVEN IN THE EMERGENCY:  Medications   morphine (PF) injection 4 mg (has no administration in time range)   lactated ringers BOLUS 1,000 mL (1,000 mLs Intravenous $New Bag 9/30/24 0812)   ondansetron (ZOFRAN) injection 8 mg (8 mg Intravenous $Given 9/30/24 0802)   iopamidol (ISOVUE-370) solution 90 mL (90 mLs Intravenous $Given 9/30/24 0949)       NEW PRESCRIPTIONS STARTED AT TODAY'S ER  VISIT  New Prescriptions    No medications on file          =================================================================    HPI    Patient information was obtained from: Patient, family, EMS    Use of Intrepreter: N/A      Susan Fair is a 89 year old female with pertinent medical history of HTN, HLD, JAGDISH and cerebrovascular disease who presents after a fall.  Patient states that she got up to use the restroom, and legs gave out from underneath her due to weakness.  She walks with a walker and was using this and lowered herself to the ground.  She denies hitting her head, denies loss of consciousness, headache, nausea vomiting, new midline neck or back pain and she is not anticoagulated.  She was not able to get herself up off the fall, prompting her senior living facility to call EMS.  Patient notes that she has been increasingly weak over the course the last 2 weeks.  Has been complaining of some periumbilical to lower abdominal pain.  Notes some nausea but no vomiting.  Stool is slightly looser than normal.  Denies any urinary symptoms.  Has not been taking anything specifically for the pain, but has been using some Zofran for the nausea.  She denies any previous abdominal surgeries.  Daughter here notes that patient has a history of questionable PMR, and was on prednisone.  However the diagnosis was excluded by rheumatology and she is now titrating off of the prednisone.  She always has early morning weakness, so this is not atypical for her and usually by noon she is back to baseline.      PAST MEDICAL HISTORY:  No past medical history on file.    PAST SURGICAL HISTORY:  No past surgical history on file.    CURRENT MEDICATIONS:    Prior to Admission Medications   Prescriptions Last Dose Informant Patient Reported? Taking?   amLODIPine (NORVASC) 10 MG tablet 9/29/2024 at PM  Yes Yes   Sig: Take 10 mg by mouth daily.   calcium citrate-vitamin D (CITRACAL) 200-6.25 MG-MCG TABS per tablet 9/29/2024  Yes Yes  "  Sig: Take 1 tablet by mouth daily.   clopidogrel (PLAVIX) 75 MG tablet 9/29/2024 at PM  Yes Yes   Sig: Take 75 mg by mouth daily.   famotidine (PEPCID) 20 MG tablet 9/29/2024 at PM  Yes Yes   Sig: Take 20 mg by mouth at bedtime.   losartan-hydrochlorothiazide (HYZAAR) 100-25 MG tablet 9/29/2024 at PM  Yes Yes   Sig: Take 1 tablet by mouth daily.   ondansetron (ZOFRAN ODT) 4 MG ODT tab 9/29/2024 at PM  Yes Yes   Sig: Take 4 mg by mouth every 12 hours as needed for nausea.   predniSONE (DELTASONE) 2.5 MG tablet   Yes Yes   Sig: Take 2.5 mg by mouth daily.   predniSONE (DELTASONE) 5 MG tablet   Yes Yes   Sig: Take 5 mg by mouth daily.      Facility-Administered Medications: None       ALLERGIES:  No Known Allergies    FAMILY HISTORY:  No family history on file.    SOCIAL HISTORY:        VITALS:  Patient Vitals for the past 24 hrs:   BP Temp Temp src Pulse Resp SpO2 Height Weight   09/30/24 0657 137/64 98.5  F (36.9  C) Oral 108 19 93 % 1.448 m (4' 9\") 81.6 kg (180 lb)       PHYSICAL EXAM    General Appearance: Well-appearing, well-nourished, no acute distress   Head:  Normocephalic, atraumatic  Eyes:  PERRL, conjunctiva/corneas clear, EOM's intact  ENT:  membranes are dry  Neck:  Supple, no midline tenderness palpation  Cardio: Mildly tachycardic in the 100s, regular rhythm  Pulm:  No respiratory distress, clear to auscultation bilaterally  Back:  No midline tenderness to palpation, no paraspinal tenderness,  Abdomen:  Soft, periumbilical abdominal pain, non distended,no rebound or guarding.  Extremities: Passive and active range of motion of all 4 extremities unremarkable  Skin:  Skin warm, dry, no rashes  Neuro:  Alert and oriented ×3, moving all extremities, no gross sensory defects, GCS 15     RADIOLOGY/LABS:  Reviewed all pertinent imaging. Please see official radiology report. All pertinent labs reviewed and interpreted.    Results for orders placed or performed during the hospital encounter of 09/30/24   CT " Abdomen Pelvis w Contrast   Result Value Ref Range    Radiologist flags Colonic mass (Urgent)     Impression    IMPRESSION:   1.  Irregular masslike wall thickening of the hepatic flexure with adjacent inflammatory changes, suspicious for primary colonic neoplasm. No abscess or perforation. GI consultation for consideration of endoscopy is recommended.  2.  Prominent mesenteric lymph node adjacent to the SMV, suspicious for mora disease.  3.  No convincing evidence of solid organ metastasis in the abdomen or pelvis.  4.  4.2 cm left adnexal cystic lesion, which is incompletely characterized. Recommend nonemergent follow-up pelvic ultrasound to further assess (if within patient goals of care).        [Urgent Result: Colonic mass]    Finding was identified on 9/30/2024 9:59 AM CDT.     Dr. Us was contacted by me on 9/30/2024 10:23 AM CDT and verbalized understanding of the result.   Comprehensive metabolic panel   Result Value Ref Range    Sodium 137 135 - 145 mmol/L    Potassium 4.2 3.4 - 5.3 mmol/L    Carbon Dioxide (CO2) 26 22 - 29 mmol/L    Anion Gap 11 7 - 15 mmol/L    Urea Nitrogen 11.5 8.0 - 23.0 mg/dL    Creatinine 0.76 0.51 - 0.95 mg/dL    GFR Estimate 72 >60 mL/min/1.73m2    Calcium 8.6 (L) 8.8 - 10.4 mg/dL    Chloride 100 98 - 107 mmol/L    Glucose 125 (H) 70 - 99 mg/dL    Alkaline Phosphatase 75 40 - 150 U/L    AST 17 0 - 45 U/L    ALT 14 0 - 50 U/L    Protein Total 6.5 6.4 - 8.3 g/dL    Albumin 3.5 3.5 - 5.2 g/dL    Bilirubin Total 0.5 <=1.2 mg/dL   Result Value Ref Range    Lipase 20 13 - 60 U/L   UA with Microscopic reflex to Culture    Specimen: Urine, Straight Catheter   Result Value Ref Range    Color Urine Yellow Colorless, Straw, Light Yellow, Yellow    Appearance Urine Clear Clear    Glucose Urine Negative Negative mg/dL    Bilirubin Urine Negative Negative    Ketones Urine Negative Negative mg/dL    Specific Gravity Urine 1.019 1.001 - 1.030    Blood Urine Negative Negative    pH Urine 6.5  5.0 - 7.0    Protein Albumin Urine 10 (A) Negative mg/dL    Urobilinogen Urine <2.0 <2.0 mg/dL    Nitrite Urine Negative Negative    Leukocyte Esterase Urine Negative Negative    Mucus Urine Present (A) None Seen /LPF    RBC Urine 1 <=2 /HPF    WBC Urine 2 <=5 /HPF    Squamous Epithelials Urine 2 (H) <=1 /HPF   CBC with platelets and differential   Result Value Ref Range    WBC Count 15.9 (H) 4.0 - 11.0 10e3/uL    RBC Count 3.47 (L) 3.80 - 5.20 10e6/uL    Hemoglobin 10.1 (L) 11.7 - 15.7 g/dL    Hematocrit 32.0 (L) 35.0 - 47.0 %    MCV 92 78 - 100 fL    MCH 29.1 26.5 - 33.0 pg    MCHC 31.6 31.5 - 36.5 g/dL    RDW 13.9 10.0 - 15.0 %    Platelet Count 303 150 - 450 10e3/uL    % Neutrophils 87 %    % Lymphocytes 9 %    % Monocytes 3 %    % Eosinophils 0 %    % Basophils 0 %    % Immature Granulocytes 1 %    NRBCs per 100 WBC 0 <1 /100    Absolute Neutrophils 13.8 (H) 1.6 - 8.3 10e3/uL    Absolute Lymphocytes 1.5 0.8 - 5.3 10e3/uL    Absolute Monocytes 0.4 0.0 - 1.3 10e3/uL    Absolute Eosinophils 0.0 0.0 - 0.7 10e3/uL    Absolute Basophils 0.0 0.0 - 0.2 10e3/uL    Absolute Immature Granulocytes 0.1 <=0.4 10e3/uL    Absolute NRBCs 0.0 10e3/uL           Cinda Us MD  Emergency Medicine  [facility] EMERGENCY DEPARTMENT  [address] [facility] [id number] [phone]  Dept: [phone]     Cinda Us MD  09/30/24 8301

## 2024-09-30 NOTE — CONSULTS
[facility]   Consultation      Place of Service: [facility]  Reason for Consultation: Colon mass   Consult Requested by: Dr. Radha Castillo    History of Present Illness: Susan Fair is a 89 y/o female with history of hypertension, sleep apnea, hypercholesterolemia and prior CVA on Plavix who presented to the ED this morning after a mechanical fall. She also noted abdominal pain and melena. She states that she initially had the abdominal pain and melena about 2 weeks ago, though this seemed worse last night with associated nausea.  Currently this is somewhat improved. She has also noted some increasing weakness in the last 2 weeks. Continues to be productive of stools, denies nausea at this time.     On presentation, she was afebrile and vitally stable, though she was mildly tachycardic initially. Labs were notable for a leukocytosis on 15.9, likely secondary to a recent dose of prednisone as she was previously suspected to have PMR. HGB is 10.1 and Cr 0.76. A CT abdomen pelvis with IV contrast was done for evaluation of her abdominal pain, which showed irregular mass like wall thickening at the hepatic flexure with some prominent mesenteric lymph nodes. No evidence of obstruction, abscess, perforation, or solid organ metastasis. Also noted was a 4.2 cm left adnexal cystic lesion and non emergent pelvic ultrasound was recommended. She has been seen by GI, and they are planning for a bowel prep this evening and a colonoscopy tomorrow.     Denies any abdominal surgical history. Recalls last colonoscopy was likely over 20 years ago.     Pertinent Labs:   Lab Results: personally reviewed   Lab Results   Component Value Date     09/30/2024    CO2 26 09/30/2024    BUN 11.5 09/30/2024     Lab Results   Component Value Date    WBC 15.9 09/30/2024    HGB 10.1 09/30/2024    HCT 32.0 09/30/2024    MCV 92 09/30/2024     09/30/2024       Pertinent Radiology:     EXAM: CT ABDOMEN  PELVIS W CONTRAST  LOCATION: [facility]  DATE: 9/30/2024     INDICATION: Periumbilical abdominal pain  COMPARISON: None.  TECHNIQUE: CT scan of the abdomen and pelvis was performed following injection of IV contrast. Multiplanar reformats were obtained. Dose reduction techniques were used.  CONTRAST: IsoVue 370 90mL     FINDINGS:   LOWER CHEST: Dependent atelectasis.     HEPATOBILIARY: Normal liver contours. Subcentimeter low-attenuation lesion in segment 5 is incompletely characterized though favored to represent a benign cyst/hemangioma. Gallbladder is unremarkable. No biliary ductal dilation.     PANCREAS: Normal.     SPLEEN: Normal.     ADRENAL GLANDS: Normal.     KIDNEYS/BLADDER: The kidneys enhance symmetrically. No urolithiasis or hydronephrosis. Trabeculated bladder contours are likely sequela of chronic outflow compromise.     BOWEL: There is annular irregular thickening of an approximately 8 cm segment of the ascending colon at the hepatic flexure with adjacent stranding/inflammation. No abscess or organized fluid collection.     Remainder of small and large bowel is normal in caliber. Colonic diverticulosis. Normal appendix.     LYMPH NODES: 1.4 cm rounded low-attenuation mesenteric lymph node adjacent to the SMV (3/76).     VASCULATURE: Abdominal aorta is nonaneurysmal with moderate atheromatous disease.     PELVIC ORGANS: Uterus is present. 4.2 x 3.5 x 3.6 cm cystic lesion in the left adnexa. Trace amount of adjacent free fluid.     MUSCULOSKELETAL: Degenerative disc disease and facet osteoarthritis throughout the visualized spine. No worrisome bone lesions.                                                                      IMPRESSION:   1.  Irregular masslike wall thickening of the hepatic flexure with adjacent inflammatory changes, suspicious for primary colonic neoplasm. No abscess or perforation. GI consultation for consideration of endoscopy is recommended.  2.  Prominent  mesenteric lymph node adjacent to the SMV, suspicious for mora disease.  3.  No convincing evidence of solid organ metastasis in the abdomen or pelvis.  4.  4.2 cm left adnexal cystic lesion, which is incompletely characterized. Recommend nonemergent follow-up pelvic ultrasound to further assess (if within patient goals of care).         No past medical history on file.    No past surgical history on file.    No family history on file.    Social History     Socioeconomic History    Marital status:      Spouse name: Not on file    Number of children: Not on file    Years of education: Not on file    Highest education level: Not on file   Occupational History    Not on file   Tobacco Use    Smoking status: Not on file    Smokeless tobacco: Not on file   Substance and Sexual Activity    Alcohol use: Not on file    Drug use: Not on file    Sexual activity: Not on file   Other Topics Concern    Not on file   Social History Narrative    Not on file     Social Determinants of Health     Financial Resource Strain: Low Risk  (9/30/2024)    Financial Resource Strain     Within the past 12 months, have you or your family members you live with been unable to get utilities (heat, electricity) when it was really needed?: No   Food Insecurity: Low Risk  (9/30/2024)    Food Insecurity     Within the past 12 months, did you worry that your food would run out before you got money to buy more?: No     Within the past 12 months, did the food you bought just not last and you didn t have money to get more?: No   Transportation Needs: Low Risk  (9/30/2024)    Transportation Needs     Within the past 12 months, has lack of transportation kept you from medical appointments, getting your medicines, non-medical meetings or appointments, work, or from getting things that you need?: No   Physical Activity: Not on file   Stress: Not on file   Social Connections: Not on file   Interpersonal Safety: Not on file   Housing Stability: High  Risk (9/30/2024)    Housing Stability     Do you have housing? : No     Are you worried about losing your housing?: No       Current Facility-Administered Medications   Medication Dose Route Frequency Provider Last Rate Last Admin    acetaminophen (TYLENOL) tablet 650 mg  650 mg Oral Q4H PRN Radha Castillo MD        Or    acetaminophen (TYLENOL) Suppository 650 mg  650 mg Rectal Q4H PRN Radha Castillo MD        amLODIPine (NORVASC) tablet 10 mg  10 mg Oral Daily Radha Castillo MD   10 mg at 09/30/24 1209    calcium carbonate (TUMS) chewable tablet 1,000 mg  1,000 mg Oral 4x Daily PRN Radha Castillo MD        HYDROmorphone (DILAUDID) injection 0.2 mg  0.2 mg Intravenous Q6H PRN Radha Castillo MD        lactated ringers infusion   Intravenous Continuous Radha Castillo MD 75 mL/hr at 09/30/24 1211 New Bag at 09/30/24 1211    lidocaine (LMX4) cream   Topical Q1H PRN Radha Castillo MD        lidocaine 1 % 0.1-1 mL  0.1-1 mL Other Q1H PRN Radha Castillo MD        polyethylene glycol (MIRALAX) powder 238 g  238 g Oral Once Gilma Weber PA-C        Followed by    [START ON 10/1/2024] magnesium citrate solution 296 mL  296 mL Oral Once Gilma Weber PA-C        morphine (PF) injection 4 mg  4 mg Intravenous Q2H PRN Cnida Us MD        naloxone (NARCAN) injection 0.2 mg  0.2 mg Intravenous Q2 Min PRN Radha Castillo MD        Or    naloxone (NARCAN) injection 0.4 mg  0.4 mg Intravenous Q2 Min PRN Radha Castillo MD        Or    naloxone (NARCAN) injection 0.2 mg  0.2 mg Intramuscular Q2 Min PRN Radha Castillo MD        Or    naloxone (NARCAN) injection 0.4 mg  0.4 mg Intramuscular Q2 Min PRN Radha Castillo MD        ondansetron (ZOFRAN) injection 4-8 mg  4-8 mg Intravenous Q6H PRN Gilma Weber PA-C        oxyCODONE (ROXICODONE) tablet 5 mg  5 mg Oral Q4H PRN Radha Castillo MD        oxyCODONE IR  "(ROXICODONE) half-tab 2.5 mg  2.5 mg Oral Q4H PRN Radha Castillo MD        pantoprazole (PROTONIX) EC tablet 40 mg  40 mg Oral QAM AC Radha Castillo MD   40 mg at 09/30/24 1210    predniSONE (DELTASONE) tablet 5 mg  5 mg Oral Daily Radha Castillo MD   5 mg at 09/30/24 1257    prochlorperazine (COMPAZINE) injection 5 mg  5 mg Intravenous Q6H PRN Radha Castillo MD        Or    prochlorperazine (COMPAZINE) tablet 5 mg  5 mg Oral Q6H PRN Radha Castillo MD        Or    prochlorperazine (COMPAZINE) suppository 12.5 mg  12.5 mg Rectal Q12H PRN Radha Castillo MD        senna-docusate (SENOKOT-S/PERICOLACE) 8.6-50 MG per tablet 1 tablet  1 tablet Oral BID PRN Radha Castillo MD        Or    senna-docusate (SENOKOT-S/PERICOLACE) 8.6-50 MG per tablet 2 tablet  2 tablet Oral BID PRN Radha Castillo MD        sodium chloride (PF) 0.9% PF flush 3 mL  3 mL Intracatheter Q8H Radha Castillo MD        sodium chloride (PF) 0.9% PF flush 3 mL  3 mL Intracatheter q1 min prn Radha Castillo MD           No Known Allergies    Review of Systems:   \"A full 10 point review of systems was taken and is negative aside from what is noted above in the HPI.\"       Intake/Output past 24 hrs:    Intake/Output Summary (Last 24 hours) at 9/30/2024 1556  Last data filed at 9/30/2024 1159  Gross per 24 hour   Intake 1000 ml   Output --   Net 1000 ml       Physical Exam:  Temp:  [98.5  F (36.9  C)-99.3  F (37.4  C)] 99.3  F (37.4  C)  Pulse:  [] 89  Resp:  [17-19] 17  BP: (126-139)/(57-64) 127/57  SpO2:  [91 %-93 %] 91 %    General: NAD, alert, cooperative  Head: normocephalic, without abnormality / atraumatic  Respiratory: non-labored breathing  Abdomen: soft, mild central tenderness and non-distended.  No guarding or rebound.   Skin: no rashes or lesions  Musculoskeletal: moves all four extremities equally; no calf edema or tenderness  Psychological: alert and oriented, " answers questions appropriately  Neurological: cranial nerves grossly intact    Assessment/Plan: Susan Fair is a 89 y/o F with history of a prior CVA on Plavix who presented to the ED this morning after a mechanical fall. She also noted recent abdominal pain, nausea, and melena prompting a CT scan. CT  showed mass like wall thickening at the hepatic flexure with some suspicious lymph nodes. There is no evidence of obstruction on imaging or clinically. She has been seen by GI and they are planning for a colonoscopy tomorrow. Should this show a likely malignancy, would recommend complete staging with a CT chest and CEA with outpatient follow up to discuss a potential resection if this is within her goals of care.     1. OK for clears from CRS perspective  2. Colonoscopy tomorrow per GI  3. If likely malignancy, would recommend completion of staging with close outpatient follow up to discuss ongoing management    Medical Decision Making:   Additional workup / testing ordered: Colonoscopy  Procedure / Surgery recommended: Colonoscopy per GI, no urgent CRS intervention indicated   Old records reviewed - Prior PCP notes, ED notes, H&P, labs, imaging  Medications added / changed: None    This case was discussed with: Dr. Kesha Cortez    Thank you for consulting [facility] regarding this patient's care. Please contact us with questions or concerns.     Today's total time spent including direct patient care and management, and care coordination: 50 minutes    Una Hunt PA-C  [facility]  [phone]

## 2024-09-30 NOTE — PLAN OF CARE
PRIMARY DIAGNOSIS: ACUTE PAIN  OUTPATIENT/OBSERVATION GOALS TO BE MET BEFORE DISCHARGE:  1. Pain Status: Improved with use of alternative comfort measures i.e.: positioning    2. Return to near baseline physical activity: Yes    3. Cleared for discharge by consultants (if involved): No    Discharge Planner Nurse   Safe discharge environment identified: No  Barriers to discharge: Yes       Entered by: Rola Bradshaw RN 09/30/2024 2:49 PM     Please review provider order for any additional goals.   Nurse to notify provider when observation goals have been met and patient is ready for discharge.Goal Outcome Evaluation:

## 2024-09-30 NOTE — PLAN OF CARE
PRIMARY DIAGNOSIS: ACUTE PAIN  OUTPATIENT/OBSERVATION GOALS TO BE MET BEFORE DISCHARGE:  1. Pain Status: Pain free.    2. Return to near baseline physical activity: Yes    3. Cleared for discharge by consultants (if involved): No    Discharge Planner Nurse   Safe discharge environment identified: no  Barriers to discharge: Yes       Entered by: Rola Bradshaw RN 09/30/2024 1:52 PM     Please review provider order for any additional goals.   Nurse to notify provider when observation goals have been met and patient is ready for discharge.Goal Outcome Evaluation:  New admit from ED. Patient is alert and orientated, but forgetful. Bed alarm in place, call light in reach. Family at bed side, awaiting GI to see. Patent denies pain at this time, up to bed side commode with assist of one person.

## 2024-09-30 NOTE — ED NOTES
"[facility] ED Handoff Report    ED Chief Complaint: fall/abd pain    ED Diagnosis:  (K63.89) Colonic mass  Comment:   Plan:     (R10.33) Periumbilical abdominal pain  Comment:   Plan:        PMH:  No past medical history on file.     Code Status:  No Order     Falls Risk: Yes Band: Applied    Current Living Situation/Residence: lives alone     Elimination Status: Continent: Yes     Activity Level: SBA w/ walker    Patients Preferred Language:  English     Needed: No    Vital Signs:  /64   Pulse 108   Temp 98.5  F (36.9  C) (Oral)   Resp 19   Ht 1.448 m (4' 9\")   Wt 81.6 kg (180 lb)   SpO2 93%   BMI 38.95 kg/m       Cardiac Rhythm:     Pain Score: 2/10    Is the Patient Confused:  No    Last Food or Drink: 9/29/24 at dinner    Focused Assessment:  pt reports pain 4/10 at this time and this is goal/tolerable pain.  Came in for multiple falls and ongoing abd pains. CT reveals abd mass.  Pt declines pain meds at this time as pain not severe.  A&O x 3 and is independent in senior living.  Walks with walker but feeling more weak.  Family with pt.     Tests Performed: Done: Labs and Imaging    Treatments Provided:  see mar    Family Dynamics/Concerns: No    Family Updated On Visitor Policy: Yes    Plan of Care Communicated to Family: Yes    Who Was Updated about Plan of Care: daughter at bs    Belongings Checklist Done and Signed by Patient: Yes    Belongings Sent with Patient: pt has clothing and shoes, watch, glasses and ring.     Medications sent with patient: na    Additional Information: samara    RN: Marina Tejeda RN   9/30/2024 11:44 AM       "

## 2024-09-30 NOTE — ED NOTES
Bed: JNED-07  Expected date: 9/30/24  Expected time: 6:38 AM  Means of arrival: Ambulance  Comments:  [facility] - 89F fall, abd pain, weakness, no LOC, no thinners

## 2024-09-30 NOTE — H&P
[facility]    History and Physical - Hospitalist Service       Date of Admission:  9/30/2024    Assessment & Plan   Susan Fair is a 89 year old female with history of hypertension, JAGDISH, hypercholesterolemia and prior CVA admitted on 9/30/24 due to mechanical fall and abdominal pain. Found to have colonic mass, likely malignant with 4.2 cm left adnexal cystic lesion and prominent mesenteric lymph node.     Colorectal surgery service recommends colonoscopy and outpatient follow up for staging if biopsy confirms malignancy. Colonoscopy is scheduled for 10/1/24. Will need pelvic US and follow up with GYN for 4.2 cm left adnexal cystic lesion    Colonic mass-possibly malignant  Melena  Abdominal pain  CT scan of the abdomen and pelvis showed irregular masslike wall thickening of the hepatic flexure with adjacent inflammatory changes, suspicious for primary colonic neoplasm for which GI consultation for consideration of endoscopy is recommended, prominent mesenteric lymph node adjacent to the SMV, suspicious for mora disease and 4.2 cm left adnexal cystic lesion, which is incompletely characterized for which nonemergent follow-up pelvic ultrasound is recommended to further assess (if within patient goals of care).  Given her functional status, patient is interested in pursuing intervention if recommended by colorectal surgeon.    Analgesics as needed  N.p.o. for now  Gentle IV hydration  Hold PTA clopidogrel for now  Follow-up colorectal surgery consult  Consider GI consult for endoscopic workup if patient decides to pursue surgical intervention after discussion with colorectal surgery service.      Deconditioning  She ambulates with a walker at baseline and lives independently in a senior living facility.  PT/OT    Leukocytosis  Etiology is unclear  WBC 15.9 on admission compared to 6.6 on 7/16/2024  Possibly secondary to stress-induced demargination  Monitor WBC  Urinalysis did not suggest UTI.  "   Consider starting antibiotics if there is occult infection    Hypertension  Resume PTA amlodipine  Hold PTA losartan-hydrochlorothiazide for now    Concern for PMR  Patient was taking prednisone PTA for possible PMR.  Patient was evaluated by rheumatologist who felt diagnosis of PMR is unlikely and recommends tapering off prednisone.  Continue PTA prednisone 5 mg daily with plans to taper to 2.5 mg daily in the next 1 week      4.2 cm left adnexal cystic lesion   Pelvic ultrasound prior to discharge   Outpatient follow up with gynecology service         Diet: NPO per Anesthesia Guidelines for Procedure/Surgery Except for: Meds  DVT Prophylaxis: Pneumatic Compression Devices  Golden Catheter: Not present  Lines: None     Cardiac Monitoring: None  Code Status: Full Code    Clinically Significant Risk Factors Present on Admission          # Hypocalcemia: Lowest Ca = 8.6 mg/dL in last 2 days, will monitor and replace as appropriate       # Drug Induced Platelet Defect: home medication list includes an antiplatelet medication   # Hypertension: Noted on problem list      # Anemia: based on hgb <11       # Obesity: Estimated body mass index is 38.95 kg/m  as calculated from the following:    Height as of this encounter: 1.448 m (4' 9\").    Weight as of this encounter: 81.6 kg (180 lb).              Disposition Plan     Medically Ready for Discharge: Anticipated in 2-4 Days       Radha Castillo MD  Hospitalist Service  [facility]  Securely message with [employer] (more info)  Text page via [employer] Paging/Directory     ______________________________________________________________________    Chief Complaint   Mechanical fall and abdominal pain.    History is obtained from the patient    History of Present Illness   Susan Fair is a 89 year old female with history of hypertension, JAGDISH, hypercholesterolemia and prior CVA who presents to the ER following an episode of mechanical fall and abdominal " pain.    She was in her usual state of health until about 2 weeks ago when she developed abdominal pain associated with melena. She has attributes melena to iron pill.  Patient fell today while walking with a walker to the bathroom and landed on her buttocks.  She denies any injury to the head or neck. She ambulates with a walker at baseline and lives independently in a senior living facility.  The last time she had endoscopic workup was about 20 years ago.    CT scan of the abdomen and pelvis showed irregular masslike wall thickening of the hepatic flexure with adjacent inflammatory changes, suspicious for primary colonic neoplasm for which GI consultation for consideration of endoscopy is recommended, prominent mesenteric lymph node adjacent to the SMV, suspicious for mora disease and 4.2 cm left adnexal cystic lesion, which is incompletely characterized for which nonemergent follow-up pelvic ultrasound is recommended to further assess (if within patient goals of care).  Given her functional status, patient is interested in pursuing intervention if recommended by colorectal surgeon. Colorectal surgery service has been consulted on admission by ER attending to assist with further management of colorectal mass depending on goals of care. Initial blood pressure was 137/64, pulse 108, respiratory rate 19, temperature 98.5   F and oxygen saturation of 93% on room air.  Notable laboratory findings include WBC 15.9, hemoglobin 10.1, and unremarkable CMP.  Urinalysis did not suggest UTI.      She received Ringer's lactate 1 L bolus, Zofran and morphine in the ER.  She wants to be full code.    Past Medical History    No past medical history on file.    Past Surgical History   No past surgical history on file.    Prior to Admission Medications   Prior to Admission Medications   Prescriptions Last Dose Informant Patient Reported? Taking?   amLODIPine (NORVASC) 10 MG tablet 9/29/2024 at PM  Yes Yes   Sig: Take 10 mg by  mouth daily.   calcium citrate-vitamin D (CITRACAL) 200-6.25 MG-MCG TABS per tablet 9/29/2024  Yes Yes   Sig: Take 1 tablet by mouth daily.   clopidogrel (PLAVIX) 75 MG tablet 9/29/2024 at PM  Yes Yes   Sig: Take 75 mg by mouth daily.   famotidine (PEPCID) 20 MG tablet 9/29/2024 at PM  Yes Yes   Sig: Take 20 mg by mouth at bedtime.   ferrous sulfate (FEROSUL) 325 (65 Fe) MG tablet 9/29/2024  Yes Yes   Sig: Take 325 mg by mouth daily.   losartan-hydrochlorothiazide (HYZAAR) 100-25 MG tablet 9/29/2024 at PM  Yes Yes   Sig: Take 1 tablet by mouth daily.   ondansetron (ZOFRAN ODT) 4 MG ODT tab 9/29/2024 at PM  Yes Yes   Sig: Take 4 mg by mouth every 12 hours as needed for nausea.   predniSONE (DELTASONE) 2.5 MG tablet   Yes Yes   Sig: Take 2.5 mg by mouth daily.   predniSONE (DELTASONE) 5 MG tablet   Yes Yes   Sig: Take 5 mg by mouth daily.      Facility-Administered Medications: None        Review of Systems    The 10 point Review of Systems is negative other than noted in the HPI or here.     Physical Exam   Vital Signs: Temp: 98.5  F (36.9  C) Temp src: Oral BP: 137/64 Pulse: 108   Resp: 19 SpO2: 93 % O2 Device: None (Room air)    Weight: 180 lbs 0 oz    General appearance: Awake, Alert, Cooperative, not in any obvious distress and appears stated age   HEENT: Normocephalic, atraumatic, conjunctiva clear without icterus and ears without discharge  Lungs: Clear to auscultation bilaterally, no wheezing, good air exchange, normal work of breathing  Cardiovascular: Regular Rate and Rythm, normal apical impulse, normal S1 and S2, trace lower extremity edema bilaterally  Abdomen: Soft, mild, diffuse tenderness, active bowel sounds  Skin: Skin color, texture normal and bruising or bleeding. No rashes or lesions over face, neck, arms and legs, turgor normal.  Musculoskeletal: No bony deformities or joint tenderness. Normal ROM upon flexion & extension.   Neurologic: Alert & Oriented X 3, Facial symmetry preserved and upper &  lower extremities moving well with symmetry  Psychiatric: Calm, normal eye contact and normal affect      Medical Decision Making       60 MINUTES SPENT BY ME on the date of service doing chart review, history, exam, documentation & further activities per the note.      Data     I have personally reviewed the following data over the past 24 hrs:    15.9 (H)  \   10.1 (L)   / 303     137 100 11.5 /  125 (H)   4.2 26 0.76 \     ALT: 14 AST: 17 AP: 75 TBILI: 0.5   ALB: 3.5 TOT PROTEIN: 6.5 LIPASE: 20       Imaging results reviewed over the past 24 hrs:   Recent Results (from the past 24 hour(s))   CT Abdomen Pelvis w Contrast   Result Value    Radiologist flags Colonic mass (Urgent)    Narrative    EXAM: CT ABDOMEN PELVIS W CONTRAST  LOCATION: [facility]  DATE: 9/30/2024    INDICATION: Periumbilical abdominal pain  COMPARISON: None.  TECHNIQUE: CT scan of the abdomen and pelvis was performed following injection of IV contrast. Multiplanar reformats were obtained. Dose reduction techniques were used.  CONTRAST: IsoVue 370 90mL    FINDINGS:   LOWER CHEST: Dependent atelectasis.    HEPATOBILIARY: Normal liver contours. Subcentimeter low-attenuation lesion in segment 5 is incompletely characterized though favored to represent a benign cyst/hemangioma. Gallbladder is unremarkable. No biliary ductal dilation.    PANCREAS: Normal.    SPLEEN: Normal.    ADRENAL GLANDS: Normal.    KIDNEYS/BLADDER: The kidneys enhance symmetrically. No urolithiasis or hydronephrosis. Trabeculated bladder contours are likely sequela of chronic outflow compromise.    BOWEL: There is annular irregular thickening of an approximately 8 cm segment of the ascending colon at the hepatic flexure with adjacent stranding/inflammation. No abscess or organized fluid collection.    Remainder of small and large bowel is normal in caliber. Colonic diverticulosis. Normal appendix.    LYMPH NODES: 1.4 cm rounded low-attenuation mesenteric  lymph node adjacent to the SMV (3/76).    VASCULATURE: Abdominal aorta is nonaneurysmal with moderate atheromatous disease.    PELVIC ORGANS: Uterus is present. 4.2 x 3.5 x 3.6 cm cystic lesion in the left adnexa. Trace amount of adjacent free fluid.    MUSCULOSKELETAL: Degenerative disc disease and facet osteoarthritis throughout the visualized spine. No worrisome bone lesions.      Impression    IMPRESSION:   1.  Irregular masslike wall thickening of the hepatic flexure with adjacent inflammatory changes, suspicious for primary colonic neoplasm. No abscess or perforation. GI consultation for consideration of endoscopy is recommended.  2.  Prominent mesenteric lymph node adjacent to the SMV, suspicious for mora disease.  3.  No convincing evidence of solid organ metastasis in the abdomen or pelvis.  4.  4.2 cm left adnexal cystic lesion, which is incompletely characterized. Recommend nonemergent follow-up pelvic ultrasound to further assess (if within patient goals of care).        [Urgent Result: Colonic mass]    Finding was identified on 9/30/2024 9:59 AM CDT.     Dr. Us was contacted by me on 9/30/2024 10:23 AM CDT and verbalized understanding of the result.

## 2024-09-30 NOTE — ED NOTES
Resting.  Family at bs.  Pt reports pain very minimal at this time and would not like any meds at this time. LR cont to infuse. Support placed under pt knees for comfort.  Vss.  No c/o at this time.  Awaits admit.

## 2024-09-30 NOTE — ED TRIAGE NOTES
Pt brought in by ambulance from [facility] due to fall. Per pt, she was about to go to the bathroom when she fell on a carpeted floor, and could not stand up. Denies hitting her head,  LOC and thinners.Pt is also complaining of lower abd pain for 2 weeks now with loose stool and nausea     Triage Assessment (Adult)       Row Name 09/30/24 0658          Triage Assessment    Airway WDL WDL        Respiratory WDL    Respiratory WDL WDL        Skin Circulation/Temperature WDL    Skin Circulation/Temperature WDL WDL        Cardiac WDL    Cardiac WDL X;rhythm     Pulse Rate & Regularity tachycardic        Peripheral/Neurovascular WDL    Peripheral Neurovascular WDL WDL        Cognitive/Neuro/Behavioral WDL    Cognitive/Neuro/Behavioral WDL WDL

## 2024-10-01 ENCOUNTER — ANESTHESIA (OUTPATIENT)
Dept: SURGERY | Facility: HOSPITAL | Age: 89
End: 2024-10-01
Payer: COMMERCIAL

## 2024-10-01 PROBLEM — N94.89 ADNEXAL MASS: Status: ACTIVE | Noted: 2024-10-01

## 2024-10-01 LAB
COLONOSCOPY: NORMAL
GLUCOSE BLDC GLUCOMTR-MCNC: 114 MG/DL (ref 70–99)
HOLD SPECIMEN: NORMAL
MAGNESIUM SERPL-MCNC: 2.2 MG/DL (ref 1.7–2.3)
POTASSIUM SERPL-SCNC: 4 MMOL/L (ref 3.4–5.3)

## 2024-10-01 PROCEDURE — 370N000017 HC ANESTHESIA TECHNICAL FEE, PER MIN: Performed by: INTERNAL MEDICINE

## 2024-10-01 PROCEDURE — 272N000001 HC OR GENERAL SUPPLY STERILE: Performed by: INTERNAL MEDICINE

## 2024-10-01 PROCEDURE — 250N000013 HC RX MED GY IP 250 OP 250 PS 637: Performed by: INTERNAL MEDICINE

## 2024-10-01 PROCEDURE — 45378 DIAGNOSTIC COLONOSCOPY: CPT | Performed by: NURSE ANESTHETIST, CERTIFIED REGISTERED

## 2024-10-01 PROCEDURE — G0378 HOSPITAL OBSERVATION PER HR: HCPCS

## 2024-10-01 PROCEDURE — 999N000141 HC STATISTIC PRE-PROCEDURE NURSING ASSESSMENT: Performed by: INTERNAL MEDICINE

## 2024-10-01 PROCEDURE — 250N000011 HC RX IP 250 OP 636: Performed by: NURSE ANESTHETIST, CERTIFIED REGISTERED

## 2024-10-01 PROCEDURE — 82378 CARCINOEMBRYONIC ANTIGEN: CPT | Performed by: COLON & RECTAL SURGERY

## 2024-10-01 PROCEDURE — 45378 DIAGNOSTIC COLONOSCOPY: CPT | Performed by: STUDENT IN AN ORGANIZED HEALTH CARE EDUCATION/TRAINING PROGRAM

## 2024-10-01 PROCEDURE — 250N000012 HC RX MED GY IP 250 OP 636 PS 637: Performed by: INTERNAL MEDICINE

## 2024-10-01 PROCEDURE — 99100 ANES PT EXTEME AGE<1 YR&>70: CPT | Performed by: NURSE ANESTHETIST, CERTIFIED REGISTERED

## 2024-10-01 PROCEDURE — 88341 IMHCHEM/IMCYTCHM EA ADD ANTB: CPT | Mod: TC,XU | Performed by: INTERNAL MEDICINE

## 2024-10-01 PROCEDURE — 84132 ASSAY OF SERUM POTASSIUM: CPT | Performed by: INTERNAL MEDICINE

## 2024-10-01 PROCEDURE — 360N000075 HC SURGERY LEVEL 2, PER MIN: Performed by: INTERNAL MEDICINE

## 2024-10-01 PROCEDURE — 258N000003 HC RX IP 258 OP 636: Performed by: INTERNAL MEDICINE

## 2024-10-01 PROCEDURE — 250N000011 HC RX IP 250 OP 636: Performed by: PHYSICIAN ASSISTANT

## 2024-10-01 PROCEDURE — 82962 GLUCOSE BLOOD TEST: CPT

## 2024-10-01 PROCEDURE — 99232 SBSQ HOSP IP/OBS MODERATE 35: CPT | Performed by: INTERNAL MEDICINE

## 2024-10-01 PROCEDURE — 83735 ASSAY OF MAGNESIUM: CPT | Performed by: INTERNAL MEDICINE

## 2024-10-01 PROCEDURE — 96376 TX/PRO/DX INJ SAME DRUG ADON: CPT | Mod: 59

## 2024-10-01 PROCEDURE — 96361 HYDRATE IV INFUSION ADD-ON: CPT | Mod: 59

## 2024-10-01 PROCEDURE — 36415 COLL VENOUS BLD VENIPUNCTURE: CPT | Performed by: INTERNAL MEDICINE

## 2024-10-01 PROCEDURE — 250N000013 HC RX MED GY IP 250 OP 250 PS 637: Performed by: PHYSICIAN ASSISTANT

## 2024-10-01 PROCEDURE — 250N000009 HC RX 250: Performed by: NURSE ANESTHETIST, CERTIFIED REGISTERED

## 2024-10-01 PROCEDURE — 258N000003 HC RX IP 258 OP 636: Performed by: NURSE ANESTHETIST, CERTIFIED REGISTERED

## 2024-10-01 PROCEDURE — 250N000011 HC RX IP 250 OP 636: Performed by: INTERNAL MEDICINE

## 2024-10-01 RX ORDER — LIDOCAINE 40 MG/G
CREAM TOPICAL
Status: DISCONTINUED | OUTPATIENT
Start: 2024-10-01 | End: 2024-10-01 | Stop reason: HOSPADM

## 2024-10-01 RX ORDER — ACETAMINOPHEN 325 MG/1
650 TABLET ORAL 3 TIMES DAILY
Status: DISCONTINUED | OUTPATIENT
Start: 2024-10-01 | End: 2024-10-01

## 2024-10-01 RX ORDER — OXYCODONE HYDROCHLORIDE 5 MG/1
5 TABLET ORAL AT BEDTIME
Status: DISCONTINUED | OUTPATIENT
Start: 2024-10-01 | End: 2024-10-02 | Stop reason: HOSPADM

## 2024-10-01 RX ORDER — OXYCODONE HYDROCHLORIDE 5 MG/1
5 TABLET ORAL EVERY 4 HOURS PRN
Status: DISCONTINUED | OUTPATIENT
Start: 2024-10-01 | End: 2024-10-02 | Stop reason: HOSPADM

## 2024-10-01 RX ORDER — ACETAMINOPHEN 325 MG/1
650 TABLET ORAL 3 TIMES DAILY
Status: DISCONTINUED | OUTPATIENT
Start: 2024-10-01 | End: 2024-10-02 | Stop reason: HOSPADM

## 2024-10-01 RX ORDER — FENTANYL CITRATE 50 UG/ML
25 INJECTION, SOLUTION INTRAMUSCULAR; INTRAVENOUS EVERY 5 MIN PRN
Status: DISCONTINUED | OUTPATIENT
Start: 2024-10-01 | End: 2024-10-01

## 2024-10-01 RX ORDER — PROPOFOL 10 MG/ML
INJECTION, EMULSION INTRAVENOUS CONTINUOUS PRN
Status: DISCONTINUED | OUTPATIENT
Start: 2024-10-01 | End: 2024-10-01

## 2024-10-01 RX ORDER — HYDROMORPHONE HCL IN WATER/PF 6 MG/30 ML
0.4 PATIENT CONTROLLED ANALGESIA SYRINGE INTRAVENOUS EVERY 5 MIN PRN
Status: DISCONTINUED | OUTPATIENT
Start: 2024-10-01 | End: 2024-10-01

## 2024-10-01 RX ORDER — NALOXONE HYDROCHLORIDE 0.4 MG/ML
0.1 INJECTION, SOLUTION INTRAMUSCULAR; INTRAVENOUS; SUBCUTANEOUS
Status: DISCONTINUED | OUTPATIENT
Start: 2024-10-01 | End: 2024-10-01

## 2024-10-01 RX ORDER — ONDANSETRON 4 MG/1
4 TABLET, ORALLY DISINTEGRATING ORAL EVERY 30 MIN PRN
Status: DISCONTINUED | OUTPATIENT
Start: 2024-10-01 | End: 2024-10-01

## 2024-10-01 RX ORDER — FAMOTIDINE 20 MG/1
20 TABLET, FILM COATED ORAL AT BEDTIME
Status: DISCONTINUED | OUTPATIENT
Start: 2024-10-01 | End: 2024-10-02 | Stop reason: HOSPADM

## 2024-10-01 RX ORDER — OXYCODONE HYDROCHLORIDE 5 MG/1
10 TABLET ORAL
Status: DISCONTINUED | OUTPATIENT
Start: 2024-10-01 | End: 2024-10-01

## 2024-10-01 RX ORDER — ONDANSETRON 2 MG/ML
4 INJECTION INTRAMUSCULAR; INTRAVENOUS
Status: DISCONTINUED | OUTPATIENT
Start: 2024-10-01 | End: 2024-10-01 | Stop reason: HOSPADM

## 2024-10-01 RX ORDER — OXYCODONE HYDROCHLORIDE 5 MG/1
5 TABLET ORAL
Status: DISCONTINUED | OUTPATIENT
Start: 2024-10-01 | End: 2024-10-01

## 2024-10-01 RX ORDER — ACETAMINOPHEN 325 MG/1
650 TABLET ORAL EVERY 8 HOURS PRN
Status: DISCONTINUED | OUTPATIENT
Start: 2024-10-01 | End: 2024-10-02 | Stop reason: HOSPADM

## 2024-10-01 RX ORDER — SODIUM CHLORIDE, SODIUM LACTATE, POTASSIUM CHLORIDE, CALCIUM CHLORIDE 600; 310; 30; 20 MG/100ML; MG/100ML; MG/100ML; MG/100ML
INJECTION, SOLUTION INTRAVENOUS CONTINUOUS PRN
Status: DISCONTINUED | OUTPATIENT
Start: 2024-10-01 | End: 2024-10-01

## 2024-10-01 RX ORDER — SODIUM CHLORIDE, SODIUM LACTATE, POTASSIUM CHLORIDE, CALCIUM CHLORIDE 600; 310; 30; 20 MG/100ML; MG/100ML; MG/100ML; MG/100ML
INJECTION, SOLUTION INTRAVENOUS CONTINUOUS
Status: DISCONTINUED | OUTPATIENT
Start: 2024-10-01 | End: 2024-10-01

## 2024-10-01 RX ORDER — HYDROMORPHONE HCL IN WATER/PF 6 MG/30 ML
0.2 PATIENT CONTROLLED ANALGESIA SYRINGE INTRAVENOUS EVERY 5 MIN PRN
Status: DISCONTINUED | OUTPATIENT
Start: 2024-10-01 | End: 2024-10-01

## 2024-10-01 RX ORDER — LOSARTAN POTASSIUM AND HYDROCHLOROTHIAZIDE 25; 100 MG/1; MG/1
1 TABLET ORAL DAILY
Status: DISCONTINUED | OUTPATIENT
Start: 2024-10-02 | End: 2024-10-02 | Stop reason: HOSPADM

## 2024-10-01 RX ORDER — FENTANYL CITRATE 50 UG/ML
50 INJECTION, SOLUTION INTRAMUSCULAR; INTRAVENOUS EVERY 5 MIN PRN
Status: DISCONTINUED | OUTPATIENT
Start: 2024-10-01 | End: 2024-10-01

## 2024-10-01 RX ORDER — ONDANSETRON 2 MG/ML
4 INJECTION INTRAMUSCULAR; INTRAVENOUS EVERY 30 MIN PRN
Status: DISCONTINUED | OUTPATIENT
Start: 2024-10-01 | End: 2024-10-01

## 2024-10-01 RX ORDER — SODIUM CHLORIDE, SODIUM LACTATE, POTASSIUM CHLORIDE, CALCIUM CHLORIDE 600; 310; 30; 20 MG/100ML; MG/100ML; MG/100ML; MG/100ML
INJECTION, SOLUTION INTRAVENOUS CONTINUOUS
Status: DISCONTINUED | OUTPATIENT
Start: 2024-10-01 | End: 2024-10-01 | Stop reason: HOSPADM

## 2024-10-01 RX ORDER — DEXAMETHASONE SODIUM PHOSPHATE 4 MG/ML
4 INJECTION, SOLUTION INTRA-ARTICULAR; INTRALESIONAL; INTRAMUSCULAR; INTRAVENOUS; SOFT TISSUE
Status: DISCONTINUED | OUTPATIENT
Start: 2024-10-01 | End: 2024-10-01

## 2024-10-01 RX ORDER — ACETAMINOPHEN 325 MG/1
975 TABLET ORAL 3 TIMES DAILY
Status: DISCONTINUED | OUTPATIENT
Start: 2024-10-01 | End: 2024-10-01

## 2024-10-01 RX ORDER — LIDOCAINE HYDROCHLORIDE 20 MG/ML
INJECTION, SOLUTION INFILTRATION; PERINEURAL PRN
Status: DISCONTINUED | OUTPATIENT
Start: 2024-10-01 | End: 2024-10-01

## 2024-10-01 RX ORDER — PROPOFOL 10 MG/ML
INJECTION, EMULSION INTRAVENOUS PRN
Status: DISCONTINUED | OUTPATIENT
Start: 2024-10-01 | End: 2024-10-01

## 2024-10-01 RX ADMIN — PREDNISONE 5 MG: 5 TABLET ORAL at 08:27

## 2024-10-01 RX ADMIN — PROPOFOL 100 MCG/KG/MIN: 10 INJECTION, EMULSION INTRAVENOUS at 13:18

## 2024-10-01 RX ADMIN — LIDOCAINE HYDROCHLORIDE 30 MG: 20 INJECTION, SOLUTION INFILTRATION; PERINEURAL at 13:18

## 2024-10-01 RX ADMIN — OXYCODONE HYDROCHLORIDE 5 MG: 5 TABLET ORAL at 21:50

## 2024-10-01 RX ADMIN — MAGNESIUM CITRATE 296 ML: 1.75 LIQUID ORAL at 02:28

## 2024-10-01 RX ADMIN — AMLODIPINE BESYLATE 10 MG: 5 TABLET ORAL at 08:27

## 2024-10-01 RX ADMIN — OXYCODONE HYDROCHLORIDE 2.5 MG: 5 TABLET ORAL at 01:40

## 2024-10-01 RX ADMIN — SODIUM CHLORIDE, POTASSIUM CHLORIDE, SODIUM LACTATE AND CALCIUM CHLORIDE: 600; 310; 30; 20 INJECTION, SOLUTION INTRAVENOUS at 01:39

## 2024-10-01 RX ADMIN — ONDANSETRON 4 MG: 2 INJECTION INTRAMUSCULAR; INTRAVENOUS at 09:27

## 2024-10-01 RX ADMIN — POLYETHYLENE GLYCOL 3350, SODIUM SULFATE ANHYDROUS, SODIUM BICARBONATE, SODIUM CHLORIDE, POTASSIUM CHLORIDE 4000 ML: 236; 22.74; 6.74; 5.86; 2.97 POWDER, FOR SOLUTION ORAL at 08:28

## 2024-10-01 RX ADMIN — FAMOTIDINE 20 MG: 20 TABLET ORAL at 21:51

## 2024-10-01 RX ADMIN — PANTOPRAZOLE SODIUM 40 MG: 40 TABLET, DELAYED RELEASE ORAL at 08:27

## 2024-10-01 RX ADMIN — PROPOFOL 30 MG: 10 INJECTION, EMULSION INTRAVENOUS at 13:18

## 2024-10-01 RX ADMIN — ACETAMINOPHEN 650 MG: 325 TABLET ORAL at 19:59

## 2024-10-01 RX ADMIN — SODIUM CHLORIDE, POTASSIUM CHLORIDE, SODIUM LACTATE AND CALCIUM CHLORIDE: 600; 310; 30; 20 INJECTION, SOLUTION INTRAVENOUS at 13:04

## 2024-10-01 RX ADMIN — HYDROMORPHONE HYDROCHLORIDE 0.2 MG: 0.2 INJECTION, SOLUTION INTRAMUSCULAR; INTRAVENOUS; SUBCUTANEOUS at 09:27

## 2024-10-01 RX ADMIN — LIDOCAINE HYDROCHLORIDE 20 MG: 20 INJECTION, SOLUTION INFILTRATION; PERINEURAL at 13:30

## 2024-10-01 ASSESSMENT — ACTIVITIES OF DAILY LIVING (ADL)
ADLS_ACUITY_SCORE: 42
ADLS_ACUITY_SCORE: 42
ADLS_ACUITY_SCORE: 43
ADLS_ACUITY_SCORE: 43
ADLS_ACUITY_SCORE: 36
ADLS_ACUITY_SCORE: 42
ADLS_ACUITY_SCORE: 42
ADLS_ACUITY_SCORE: 36
ADLS_ACUITY_SCORE: 36
ADLS_ACUITY_SCORE: 43
ADLS_ACUITY_SCORE: 36
ADLS_ACUITY_SCORE: 42
ADLS_ACUITY_SCORE: 42
ADLS_ACUITY_SCORE: 43
ADLS_ACUITY_SCORE: 36
ADLS_ACUITY_SCORE: 36
ADLS_ACUITY_SCORE: 42
ADLS_ACUITY_SCORE: 42
ADLS_ACUITY_SCORE: 43
ADLS_ACUITY_SCORE: 36
ADLS_ACUITY_SCORE: 43

## 2024-10-01 NOTE — PROGRESS NOTES
[facility]    Medicine Progress Note - Hospitalist Service    Date of Admission:  9/30/2024    Assessment & Plan     Susan Fair is a 89 year old female with history of hypertension, JAGDISH, hypercholesterolemia and prior CVA admitted on 9/30/24 due to mechanical fall and abdominal pain. Found to have a colonic mass, likely malignant.      Colonic mass  Patient presents with intermittent periumbilical abdominal pain for 2 weeks.   CT scan of the abdomen and pelvis showed irregular masslike wall thickening of the hepatic flexure with adjacent inflammatory changes and mesenteric adenopathy, suspicious for primary colonic neoplasm.  Underwent colonoscopy today. It revealed a 5 cm fungating ulcerated partially obstructing large mass at the hepatic flexure.  Likely malignant. Biopsy taken.  - Per GI, patient can be discharged home today.  - Per colorectal surgery, patient can follow up out patient once the pathology result available.  - Pain control: currently not adequate. Patient reports having severe pain waking her up at night. Will start tylenol tid scheduled. Oxycodone 5 mg at bedtime. Additional Oxycodone prn.  - Advance diet as tolerated.     Generalized weakness, fall at home:   She ambulates with a walker at baseline and lives independently in a senior living facility.  - PT/OT    Essential hypertension: Resume PTA amlodipine and losartan-hydrochlorothiazide.    Concern for PMR  Patient has been taking prednisone for possible PMR.  Patient was evaluated by rheumatologist who felt diagnosis of PMR is unlikely and recommends tapering off prednisone.  Continue PTA prednisone 5 mg daily with plans to taper to 2.5 mg daily in the next 1 week    Adnexal cystic lesion:   CT scan reveals a 4.2 cm left adnexal cystic lesion, which is incompletely characterized.   - Recommend nonemergent follow-up pelvic ultrasound outpatient to further assess.       Diet: Regular diet  DVT Prophylaxis: Pneumatic  "Compression Devices  Golden Catheter: Not present  Lines: None     Cardiac Monitoring: None  Code Status: Full Code          Diet: Regular Diet Adult    DVT Prophylaxis: Low Risk/Ambulatory with no VTE prophylaxis indicated  Golden Catheter: Not present  Lines: None     Cardiac Monitoring: None  Code Status: Full Code      Clinically Significant Risk Factors Present on Admission          # Hypocalcemia: Lowest Ca = 8.6 mg/dL in last 2 days, will monitor and replace as appropriate       # Drug Induced Platelet Defect: home medication list includes an antiplatelet medication   # Hypertension: Noted on problem list    # Anemia: based on hgb <11       # Obesity: Estimated body mass index is 39.17 kg/m  as calculated from the following:    Height as of this encounter: 1.448 m (4' 9\").    Weight as of this encounter: 82.1 kg (181 lb).              Disposition Plan     Medically Ready for Discharge: Anticipated Tomorrow             Umair Jung MD  Hospitalist Service  [facility]  Securely message with [employer] (more info)  Text page via [employer] Paging/Directory   ______________________________________________________________________    Interval History   Patient was seen before and after her colonoscopy.  After she returned from the colonoscopy, patient is able to tolerate some soup. She has concerns of going home today. She reports that her abdominal pain is not controlled. She has a lot of pain at night, which wakes her up. She also feels generalized weakness. Per her daughter, she lives independently.     Physical Exam   Vital Signs: Temp: 97.7  F (36.5  C) Temp src: Oral BP: (!) 142/60 Pulse: 112   Resp: 18 SpO2: 92 % O2 Device: None (Room air)    Weight: 180 lbs 15.96 oz    General appearance: not in acute distress  HEENT: PERRL, EOMI  Lungs: Clear breath sounds in bilateral lung fields  Cardiovascular: Regular rate and rhythm, normal S1-S2  Abdomen: Soft, periumbilical tenderness to palpation, no " distension  Musculoskeletal: No joint swelling  Skin: No rash and no edema  Neurology: AAO ×3.  Cranial nerves II - XII normal.  Normal muscle strength in all four extremities.     Medical Decision Making       48 MINUTES SPENT BY ME on the date of service doing chart review, history, exam, documentation & further activities per the note.      Data     I have personally reviewed the following data over the past 24 hrs:    N/A  \   N/A   / N/A     N/A N/A N/A /  114 (H)   4.0 N/A N/A \       Imaging results reviewed over the past 24 hrs:   No results found for this or any previous visit (from the past 24 hour(s)).

## 2024-10-01 NOTE — PLAN OF CARE
Problem: Adult Inpatient Plan of Care  Goal: Optimal Comfort and Wellbeing  Outcome: Progressing  Intervention: Monitor Pain and Promote Comfort  Recent Flowsheet Documentation  Taken 10/1/2024 0415 by Georgie Salgado RN  Pain Management Interventions:   rest   quiet environment facilitated  Taken 10/1/2024 0222 by Georgie Salgado RN  Pain Management Interventions:   rest   quiet environment facilitated  Taken 10/1/2024 0140 by Georgie Salgado RN  Pain Management Interventions: medication (see MAR)  Taken 9/30/2024 2345 by Georgie Salgado RN  Pain Management Interventions: (Pain med given around 11PM)   rest   quiet environment facilitated   other (see comments)     Problem: Pain Acute  Goal: Optimal Pain Control and Function  Outcome: Progressing  Intervention: Develop Pain Management Plan  Recent Flowsheet Documentation  Taken 10/1/2024 0415 by Georgie Salgado RN  Pain Management Interventions:   rest   quiet environment facilitated  Taken 10/1/2024 0222 by Georgie Salgado RN  Pain Management Interventions:   rest   quiet environment facilitated  Taken 10/1/2024 0140 by Georgie Salgado RN  Pain Management Interventions: medication (see MAR)  Taken 9/30/2024 2345 by Georgie Salgado RN  Pain Management Interventions: (Pain med given around 11PM)   rest   quiet environment facilitated   other (see comments)  Intervention: Prevent or Manage Pain  Recent Flowsheet Documentation  Taken 10/1/2024 0415 by Georgie Salgado RN  Medication Review/Management: medications reviewed  Taken 9/30/2024 2347 by Georgie Salgado RN  Medication Review/Management: medications reviewed     Problem: Adult Inpatient Plan of Care  Goal: Absence of Hospital-Acquired Illness or Injury  Intervention: Prevent Skin Injury  Recent Flowsheet Documentation  Taken 10/1/2024 0415 by Georgie Salgado RN  Body Position: position changed independently  Taken 9/30/2024 2347 by Georgie Salgado RN  Body Position: position changed  independently   Goal Outcome Evaluation:       Patient admitted after a fall and abdominal pain. Found out to have colonic mass. Oxycodone administered around 0140 for abdominal pain of 7/10. Colonoscopy this AM around 0730. Little forgetful. VSS. RA. Incontinent of bowel and bladder. Used bedside commode once during the night. NPO from midnight. LR infusing at 75 ml/hr. On K and Mg protocol. Last K 4.2 and Mg 2.0. Stand by assist with mobility. Slept well during the night.

## 2024-10-01 NOTE — ANESTHESIA CARE TRANSFER NOTE
Patient: Susan Fair    Procedure: Procedure(s):  COLONOSCOPY       Diagnosis: Colonic mass [K63.89]  Diagnosis Additional Information: No value filed.    Anesthesia Type:   MAC     Note:    Oropharynx: oropharynx clear of all foreign objects and spontaneously breathing  Level of Consciousness: awake  Oxygen Supplementation: room air    Independent Airway: airway patency satisfactory and stable  Dentition: dentition unchanged  Vital Signs Stable: post-procedure vital signs reviewed and stable  Report to RN Given: handoff report given  Patient transferred to: Medical/Surgical Unit    Handoff Report: Identifed the Patient, Identified the Reponsible Provider, Reviewed the pertinent medical history, Discussed the surgical course, Reviewed Intra-OP anesthesia mangement and issues during anesthesia, Set expectations for post-procedure period and Allowed opportunity for questions and acknowledgement of understanding      Vitals:see epic. Report to floor RN. Awake and in satisfactory condition.  Electronically Signed By: KATHERINE Benral CRNA  October 1, 2024  1:59 PM

## 2024-10-01 NOTE — PROGRESS NOTES
03/13/22 1537   Discharge Reassessment   Assessment Type Discharge Planning Reassessment   Discharge Plan discussed with: Patient;Adult children;Spouse/sig other   Name(s) and Number(s) Spouse Noemy Evans ([phone]) / Daughter Olena Saab ([phone])   Communicated MEENA with patient/caregiver Yes   Discharge Plan A Hospice/home   Discharge Plan B New Nursing Home placement - retirement care facility;Hospice/home   DME Needed Upon Discharge  none   Post-Acute Status   Post-Acute Authorization Hospice   Hospice Status Pending equipment/medication delivery    met with pt, spouse, and daughter at bedside this morning to discuss discharge plan. Yaneth from [facility] present by phone ([phone]). Family shared concerns regarding the logistics of caring for pt at home. After  consulted with Dr. Peña and PT, discussed expectations for care at home with family (bed-level care). Olena is requesting an alarm for pt's hospital bed if possible, which Yaneth will look into. Talked about hospice at a nursing home as the alternative to hospice at home, given that pt does not qualify for GIP at this time and hospice house does not have any openings. After discussion amongst the family, Olena called this  back and stated that they wish to take pt home (estimated discharge of tomorrow), and would like  to send nursing home referrals in case they determine that pt will need placement. Per Yaneth, they contract with the following facilities: [facility], [facility], and [facility] in [place]. Referral sent to these facilities at this time. Yaneth will look into additional options.  informed Yaneth of family's decision, and she will reach out to them to coordinate DME for discharge tomorrow. No further needs addressed at this time.   [facility] Progress Note    Subjective:  Patient did well post colonoscopy.  No abdominal pain.    Objective:  Vital signs in last 24 hours  Temp:  [97.7  F (36.5  C)-98.7  F (37.1  C)] 97.7  F (36.5  C)  Pulse:  [] 112  Resp:  [16-20] 18  BP: (126-150)/(58-71) 142/60  SpO2:  [90 %-98 %] 92 % O2 Device: None (Room air)      Constitutional: healthy, alert, and no distress   Cardiovascular: Normal rate  Respiratory: Normal respiratory rate  Abdomen: no pain upon abdominal palpation  NEURO: moves all extremities spontaneously   JOINT/EXTREMITIES: extremities normal- no gross deformities noted and normal muscle tone      LABORATORY    ELECTROLYTE PANEL   Recent Labs   Lab 10/01/24  1002 10/01/24  0611 09/30/24  0759   NA  --   --  137   POTASSIUM  --  4.0 4.2   CHLORIDE  --   --  100   CO2  --   --  26   *  --  125*   CR  --   --  0.76   BUN  --   --  11.5      HEMATOLOGY PANEL   Recent Labs   Lab 09/30/24  0759   HGB 10.1*   MCV 92   WBC 15.9*         LIVER AND PANCREAS PANEL   Recent Labs   Lab 09/30/24  0759   AST 17   ALT 14   ALKPHOS 75   BILITOTAL 0.5   LIPASE 20     IMAGING STUDIES    CT Abdomen Pelvis w Contrast    Result Date: 9/30/2024  EXAM: CT ABDOMEN PELVIS W CONTRAST LOCATION: [facility] DATE: 9/30/2024 INDICATION: Periumbilical abdominal pain COMPARISON: None. TECHNIQUE: CT scan of the abdomen and pelvis was performed following injection of IV contrast. Multiplanar reformats were obtained. Dose reduction techniques were used. CONTRAST: IsoVue 370 90mL FINDINGS: LOWER CHEST: Dependent atelectasis. HEPATOBILIARY: Normal liver contours. Subcentimeter low-attenuation lesion in segment 5 is incompletely characterized though favored to represent a benign cyst/hemangioma. Gallbladder is unremarkable. No biliary ductal dilation. PANCREAS: Normal. SPLEEN: Normal. ADRENAL GLANDS: Normal. KIDNEYS/BLADDER: The kidneys enhance symmetrically. No urolithiasis or  hydronephrosis. Trabeculated bladder contours are likely sequela of chronic outflow compromise. BOWEL: There is annular irregular thickening of an approximately 8 cm segment of the ascending colon at the hepatic flexure with adjacent stranding/inflammation. No abscess or organized fluid collection. Remainder of small and large bowel is normal in caliber. Colonic diverticulosis. Normal appendix. LYMPH NODES: 1.4 cm rounded low-attenuation mesenteric lymph node adjacent to the SMV (3/76). VASCULATURE: Abdominal aorta is nonaneurysmal with moderate atheromatous disease. PELVIC ORGANS: Uterus is present. 4.2 x 3.5 x 3.6 cm cystic lesion in the left adnexa. Trace amount of adjacent free fluid. MUSCULOSKELETAL: Degenerative disc disease and facet osteoarthritis throughout the visualized spine. No worrisome bone lesions.     IMPRESSION: 1.  Irregular masslike wall thickening of the hepatic flexure with adjacent inflammatory changes, suspicious for primary colonic neoplasm. No abscess or perforation. GI consultation for consideration of endoscopy is recommended. 2.  Prominent mesenteric lymph node adjacent to the SMV, suspicious for mora disease. 3.  No convincing evidence of solid organ metastasis in the abdomen or pelvis. 4.  4.2 cm left adnexal cystic lesion, which is incompletely characterized. Recommend nonemergent follow-up pelvic ultrasound to further assess (if within patient goals of care). [Urgent Result: Colonic mass] Finding was identified on 9/30/2024 9:59 AM CDT. Dr. Us was contacted by me on 9/30/2024 10:23 AM CDT and verbalized understanding of the result.      I have reviewed the current diagnostic and laboratory tests.                Assessment:  Susan Fair is a 89 year old year old female with history of hypertension, hyperlipidemia, obstructive sleep apnea, he was found to have colon mass on CT.    She underwent colonoscopy that showed likely malignant colonic mass in the hepatic flexure that was  circumferential.  5 cm distal to this area tattoo was placed.    Further evaluation and planning should be done with our surgical colleagues as well as oncology colleagues.    No further GI workup.  We will sign off at this time.    Plan:  --Can discharge from a GI standpoint  -- Please have patient see surgery and oncology either an inpatient or outpatient setting    25 minutes of total time was spent providing patient care including patient evaluation, reviewing documentation/test results, and .                                                  Alejandro Jennings MD  Thank you for the opportunity to participate in the care of this patient.   Please feel free to call me with any questions or concerns.  Phone number [phone].      Patient Active Problem List   Diagnosis    Hip pain, left    Cerebrovascular disease    Essential hypertension    JAGDISH (obstructive sleep apnea)    Pure hypercholesterolemia    Periumbilical abdominal pain    Colonic mass

## 2024-10-01 NOTE — PROGRESS NOTES
Patient refused hospital CPAP. She has family in the room and we discussed if she will be here longer than one night they will bring hers in.

## 2024-10-01 NOTE — PLAN OF CARE
PRIMARY DIAGNOSIS: ABDOMINAL PAIN  OUTPATIENT/OBSERVATION GOALS TO BE MET BEFORE DISCHARGE:  1. Pain Status: Improved but still requiring IV narcotics.    2. Return to near baseline physical activity: No    3. Cleared for discharge by consultants (if involved): No    Discharge Planner Nurse   Safe discharge environment identified: No  Barriers to discharge: Yes       Entered by: Cristopher Aquino RN 10/01/2024 10:07 AM

## 2024-10-01 NOTE — PLAN OF CARE
PRIMARY DIAGNOSIS: ACUTE PAIN  OUTPATIENT/OBSERVATION GOALS TO BE MET BEFORE DISCHARGE:  1. Pain Status: Improved-controlled with oral and IV pain medications.    2. Return to near baseline physical activity: Yes    3. Cleared for discharge by consultants (if involved): No    Discharge Planner Nurse   Safe discharge environment identified: Yes  Barriers to discharge: Yes       Entered by: Tatyana Gruber RN 09/30/2024 10:10 PM     Please review provider order for any additional goals.   Nurse to notify provider when observation goals have been met and patient is ready for discharge.Goal Outcome Evaluation:       Pt finished bowel prep at around 2100. Pt had one large loose BM. Up to the bedside commode with SBA. NPO at midnight and plan for colonoscopy tomorrow.

## 2024-10-01 NOTE — PROGRESS NOTES
Colon and Rectal Surgery  Daily Progress Note    Subjective  Ongoing cramping and abdominal pain last evening. Planning to undergo colonoscopy this morning, started prep yesterday. Denies nausea and productive of stools. Awaiting biopsy results to discuss next steps in management.     Objective  Intake/Output last 24 hrs:    Intake/Output Summary (Last 24 hours) at 10/1/2024 1107  Last data filed at 10/1/2024 0813  Gross per 24 hour   Intake 3400 ml   Output --   Net 3400 ml     Temp:  [97.9  F (36.6  C)-99.3  F (37.4  C)] 97.9  F (36.6  C)  Pulse:  [] 113  Resp:  [16-20] 20  BP: (126-150)/(57-71) 132/66  SpO2:  [90 %-94 %] 94 %    Physical Exam:  General: awake, alert, sitting on commode, in no acute distress  Head: normocephalic, atraumatic  Respiratory: non-labored breathing  Abdomen: Unable to perform as on commode  Skin: No rashes or lesions  Musculoskeletal: moves all four extremities equally  Psychological: alert and oriented, answers questions appropriately    Pertinent Labs  Lab Results: personally reviewed.  Lab Results   Component Value Date     09/30/2024    CO2 26 09/30/2024    BUN 11.5 09/30/2024     Lab Results   Component Value Date    WBC 15.9 09/30/2024    HGB 10.1 09/30/2024    HCT 32.0 09/30/2024    MCV 92 09/30/2024     09/30/2024       Assessment/Plan: 89 y/o F admitted for mass like wall thickening at hepatic flexure with suspicious lymph nodes on CT after evaluation of fall. Planning to undergo colonoscopy today for work-up.     -Colonoscopy with biopsy today  -Will need staging with CT chest and CEA if malignancy confirmed, this can be completed outpatient  -Plan for close outpatient follow-up to discuss next steps and possible surgical intervention pending [facility]  -OK from CRS standpoint to discharge after colonoscopy complete, will help to setup outpatient staging and follow-up    Discussed with Dr. Diego Hunt, PA-C  [facility]  [phone]..............................main

## 2024-10-01 NOTE — ANESTHESIA PREPROCEDURE EVALUATION
"Anesthesia Pre-Procedure Evaluation    Patient: Susan Fair   MRN: [id number] : 10/11/1928        Procedure : Procedure(s):  COLONOSCOPY          Past Medical History:   Diagnosis Date    Cerebral infarction (H)     Sleep apnea       Past Surgical History:   Procedure Laterality Date    COLONOSCOPY      ORTHOPEDIC SURGERY        No Known Allergies   Social History     Tobacco Use    Smoking status: Never    Smokeless tobacco: Never   Substance Use Topics    Alcohol use: Not Currently      Wt Readings from Last 1 Encounters:   10/01/24 82.1 kg (181 lb)        Anesthesia Evaluation   Pt has had prior anesthetic.     No history of anesthetic complications       ROS/MED HX  ENT/Pulmonary:     (+) sleep apnea,                                       Neurologic:     (+)          CVA,                      Cardiovascular:     (+)  hypertension- -   -  - -                                      METS/Exercise Tolerance:     Hematologic:       Musculoskeletal:       GI/Hepatic:       Renal/Genitourinary:       Endo:       Psychiatric/Substance Use:       Infectious Disease:       Malignancy:       Other:            Physical Exam    Airway        Mallampati: II    Neck ROM: full     Respiratory Devices and Support         Dental       (+) Minor Abnormalities - some fillings, tiny chips      Cardiovascular   cardiovascular exam normal          Pulmonary   pulmonary exam normal                OUTSIDE LABS:  CBC:   Lab Results   Component Value Date    WBC 15.9 (H) 2024    HGB 10.1 (L) 2024    HCT 32.0 (L) 2024     2024     BMP:   Lab Results   Component Value Date     2024    POTASSIUM 4.0 10/01/2024    POTASSIUM 4.2 2024    CHLORIDE 100 2024    CO2 26 2024    BUN 11.5 2024    CR 0.76 2024     (H) 10/01/2024     (H) 2024     COAGS: No results found for: \"PTT\", \"INR\", \"FIBR\"  POC: No results found for: \"BGM\", \"HCG\", \"HCGS\"  HEPATIC:   Lab " "Results   Component Value Date    ALBUMIN 3.5 09/30/2024    PROTTOTAL 6.5 09/30/2024    ALT 14 09/30/2024    AST 17 09/30/2024    ALKPHOS 75 09/30/2024    BILITOTAL 0.5 09/30/2024     OTHER:   Lab Results   Component Value Date    MAYRA 8.6 (L) 09/30/2024    MAG 2.2 10/01/2024    LIPASE 20 09/30/2024       Anesthesia Plan    ASA Status:  2       Anesthesia Type: MAC.              Consents    Anesthesia Plan(s) and associated risks, benefits, and realistic alternatives discussed. Questions answered and patient/representative(s) expressed understanding.     - Discussed: Risks, Benefits and Alternatives for the PROCEDURE were discussed     - Discussed with:  Patient      - Extended Intubation/Ventilatory Support Discussed: No.      - Patient is DNR/DNI Status: No     Use of blood products discussed: No .     Postoperative Care            Comments:               Keke Moralez MD    I have reviewed the pertinent notes and labs in the chart from the past 30 days and (re)examined the patient.  Any updates or changes from those notes are reflected in this note.       # Hypocalcemia: Lowest Ca = 8.6 mg/dL in last 2 days, will monitor and replace as appropriate       # Drug Induced Platelet Defect: home medication list includes an antiplatelet medication  # Obesity: Estimated body mass index is 39.17 kg/m  as calculated from the following:    Height as of this encounter: 1.448 m (4' 9\").    Weight as of this encounter: 82.1 kg (181 lb).      "

## 2024-10-01 NOTE — PLAN OF CARE
Goal Outcome Evaluation:      Plan of Care Reviewed With: patient    Overall Patient Progress: no changeOverall Patient Progress: no change    Assumed cares: 0723-5234  Vitals: VSS on RA  Pain: Pt reports abdominal pain, PRN dilaudid given to pt  Neuro: A&Ox4  Cardiac: WDL  Respiratory: WDL  GI/: Pt reports abdominal pain, pain medication given. Pt reports nausea, PRN Zofran given. Pt completed Golytely at 0900 per GI request, pt had colonoscopy today at 1020.  Skin: Bruising on left elbow  IV/Drains: L PIV- running LR at 75mL/hr  Activity: Assist x1-2  Behavior: Pt is calm and cooperative    Plan of Care: Follow patient plan of care         done

## 2024-10-01 NOTE — PLAN OF CARE
PRIMARY DIAGNOSIS: ACUTE PAIN  OUTPATIENT/OBSERVATION GOALS TO BE MET BEFORE DISCHARGE:  1. Pain Status: Improved-controlled with oral and IV pain medications.    2. Return to near baseline physical activity: Yes    3. Cleared for discharge by consultants (if involved): No    Discharge Planner Nurse   Safe discharge environment identified: Yes  Barriers to discharge: Yes       Entered by: Tatyana Gruber RN 09/30/2024 9:10 PM     Please review provider order for any additional goals.   Nurse to notify provider when observation goals have been met and patient is ready for discharge.       Pt A&Ox4 but forgetful at times. VSS. Started colon prep and tolerating ok. IVF infusing. PRN Tylenol and IV dilaudid given for abdominal pain.

## 2024-10-01 NOTE — ANESTHESIA POSTPROCEDURE EVALUATION
Patient: Susan Fair    Procedure: Procedure(s):  COLONOSCOPY       Anesthesia Type:  MAC    Note:  Disposition: Outpatient   Postop Pain Control: Uneventful            Sign Out: Well controlled pain   PONV: No   Neuro/Psych: Uneventful            Sign Out: Acceptable/Baseline neuro status   Airway/Respiratory: Uneventful            Sign Out: Acceptable/Baseline resp. status   CV/Hemodynamics: Uneventful            Sign Out: Acceptable CV status; No obvious hypovolemia; No obvious fluid overload   Other NRE: NONE   DID A NON-ROUTINE EVENT OCCUR? No    Event details/Postop Comments:  Patient recovering comfortably.        Last vitals:  Vitals:    10/01/24 1028 10/01/24 1416 10/01/24 1523   BP: 132/66 (!) 149/58 (!) 142/60   Pulse: 113 112    Resp: 20 18 18   Temp: 36.6  C (97.9  F) 36.9  C (98.5  F) 36.5  C (97.7  F)   SpO2: 94% 98% 92%       Electronically Signed By: Norm Gorman DO  October 1, 2024  4:09 PM

## 2024-10-01 NOTE — PLAN OF CARE
PRIMARY DIAGNOSIS: ACUTE PAIN  OUTPATIENT/OBSERVATION GOALS TO BE MET BEFORE DISCHARGE:  1. Pain Status: Improved-controlled with oral pain medications.    2. Return to near baseline physical activity: No    3. Cleared for discharge by consultants (if involved): No    Discharge Planner Nurse   Safe discharge environment identified: No  Barriers to discharge: Yes       Entered by: Georgie Salgado RN 10/01/2024 2:07 AM     Please review provider order for any additional goals.   Nurse to notify provider when observation goals have been met and patient is ready for discharge.Goal Outcome Evaluation:

## 2024-10-02 ENCOUNTER — APPOINTMENT (OUTPATIENT)
Dept: PHYSICAL THERAPY | Facility: HOSPITAL | Age: 89
End: 2024-10-02
Attending: INTERNAL MEDICINE
Payer: COMMERCIAL

## 2024-10-02 ENCOUNTER — APPOINTMENT (OUTPATIENT)
Dept: OCCUPATIONAL THERAPY | Facility: HOSPITAL | Age: 89
End: 2024-10-02
Attending: INTERNAL MEDICINE
Payer: COMMERCIAL

## 2024-10-02 VITALS
OXYGEN SATURATION: 94 % | WEIGHT: 181 LBS | BODY MASS INDEX: 39.05 KG/M2 | HEIGHT: 57 IN | HEART RATE: 87 BPM | DIASTOLIC BLOOD PRESSURE: 63 MMHG | RESPIRATION RATE: 16 BRPM | SYSTOLIC BLOOD PRESSURE: 131 MMHG | TEMPERATURE: 98 F

## 2024-10-02 PROCEDURE — G0378 HOSPITAL OBSERVATION PER HR: HCPCS

## 2024-10-02 PROCEDURE — 88341 IMHCHEM/IMCYTCHM EA ADD ANTB: CPT | Mod: 26 | Performed by: PATHOLOGY

## 2024-10-02 PROCEDURE — 88360 TUMOR IMMUNOHISTOCHEM/MANUAL: CPT | Mod: 26 | Performed by: PATHOLOGY

## 2024-10-02 PROCEDURE — 88305 TISSUE EXAM BY PATHOLOGIST: CPT | Mod: 26 | Performed by: PATHOLOGY

## 2024-10-02 PROCEDURE — 88342 IMHCHEM/IMCYTCHM 1ST ANTB: CPT | Mod: 26 | Performed by: PATHOLOGY

## 2024-10-02 PROCEDURE — 250N000012 HC RX MED GY IP 250 OP 636 PS 637: Performed by: INTERNAL MEDICINE

## 2024-10-02 PROCEDURE — 250N000013 HC RX MED GY IP 250 OP 250 PS 637: Performed by: INTERNAL MEDICINE

## 2024-10-02 PROCEDURE — 99239 HOSP IP/OBS DSCHRG MGMT >30: CPT | Performed by: INTERNAL MEDICINE

## 2024-10-02 PROCEDURE — 97535 SELF CARE MNGMENT TRAINING: CPT | Mod: GO

## 2024-10-02 PROCEDURE — 97165 OT EVAL LOW COMPLEX 30 MIN: CPT | Mod: GO

## 2024-10-02 PROCEDURE — 97161 PT EVAL LOW COMPLEX 20 MIN: CPT | Mod: GP

## 2024-10-02 PROCEDURE — 97116 GAIT TRAINING THERAPY: CPT | Mod: GP

## 2024-10-02 RX ORDER — ACETAMINOPHEN 325 MG/1
650 TABLET ORAL EVERY 8 HOURS PRN
Status: ON HOLD | COMMUNITY
Start: 2024-10-02

## 2024-10-02 RX ORDER — ACETAMINOPHEN 325 MG/1
650 TABLET ORAL 3 TIMES DAILY
Status: ON HOLD | COMMUNITY
Start: 2024-10-02

## 2024-10-02 RX ORDER — OXYCODONE HYDROCHLORIDE 5 MG/1
2.5-5 TABLET ORAL EVERY 6 HOURS PRN
Qty: 15 TABLET | Refills: 0 | Status: ON HOLD | OUTPATIENT
Start: 2024-10-02

## 2024-10-02 RX ADMIN — ACETAMINOPHEN 650 MG: 325 TABLET ORAL at 09:07

## 2024-10-02 RX ADMIN — PREDNISONE 5 MG: 5 TABLET ORAL at 09:07

## 2024-10-02 RX ADMIN — AMLODIPINE BESYLATE 10 MG: 5 TABLET ORAL at 09:06

## 2024-10-02 RX ADMIN — LOSARTAN POTASSIUM AND HYDROCHLOROTHIAZIDE 1 TABLET: 25; 100 TABLET ORAL at 09:06

## 2024-10-02 RX ADMIN — PANTOPRAZOLE SODIUM 40 MG: 40 TABLET, DELAYED RELEASE ORAL at 06:38

## 2024-10-02 ASSESSMENT — ACTIVITIES OF DAILY LIVING (ADL)
ADLS_ACUITY_SCORE: 42
ADLS_ACUITY_SCORE: 42
ADLS_ACUITY_SCORE: 44
ADLS_ACUITY_SCORE: 36
ADLS_ACUITY_SCORE: 42
ADLS_ACUITY_SCORE: 44
ADLS_ACUITY_SCORE: 36
DEPENDENT_IADLS:: INDEPENDENT
ADLS_ACUITY_SCORE: 44
ADLS_ACUITY_SCORE: 44
ADLS_ACUITY_SCORE: 40
ADLS_ACUITY_SCORE: 42

## 2024-10-02 NOTE — DISCHARGE SUMMARY
"[facility]  Hospitalist Discharge Summary      Date of Admission:  9/30/2024  Date of Discharge:  10/2/2024  Discharging Provider: Umair Jung MD  Discharge Service: Hospitalist Service    Discharge Diagnoses     Principal Problem:    Colonic mass  Active Problems:    Essential hypertension    Periumbilical abdominal pain    Adnexal mass       Clinically Significant Risk Factors     # Obesity: Estimated body mass index is 39.17 kg/m  as calculated from the following:    Height as of this encounter: 1.448 m (4' 9\").    Weight as of this encounter: 82.1 kg (181 lb).       Follow-ups Needed After Discharge   Follow-up Appointments     Follow-up and recommended labs and tests       * CT scan shows that you have a 4.2 cm left adnexal mass. The nature of   this mass is not clear. Follow up with your primary care provider, within   1 week, for further evaluation.  A pelvic ultrasound or transvaginal   ultrasound is often recommended.  * Follow up with colorectal surgery in one week.  Dr Kesha Cortez  [facility]  Tel: [phone]          Discharge Disposition   Discharged to home  Condition at discharge: Stable    Hospital Course     Susan Fair is a 89 year old female with history of hypertension and prior CVA admitted on 9/30/24 due to mechanical fall and abdominal pain. She is found to have a colonic mass, likely malignant.      Colonic mass  Patient presents with intermittent periumbilical abdominal pain for 2 weeks. CT scan of the abdomen and pelvis showed irregular masslike wall thickening of the hepatic flexure with adjacent inflammatory changes and mesenteric adenopathy, suspicious for primary colonic neoplasm. Colonoscopy revealed a 5 cm fungating ulcerated partially obstructing large mass at the hepatic flexure, likely malignant. Biopsy was taken. Per colorectal surgery, patient can follow up outpatient once the pathology result available.  Patient reports having " severe abdominal pain waking her up at night. She was started tylenol tid scheduled and Oxycodone as needed. Patient reports pain has adequately controlled. Patient will follow up in the colorectal surgery clinic in one week to discuss about the pathology result and plan of care.      Generalized weakness, fall at home:   She ambulates with a walker at baseline and lives independently in a senior living facility. She was evaluated by OT and TCU is recommended. However, patient and family want to go home with home care.     Adnexal cystic lesion:   CT scan reveals a 4.2 cm left adnexal cystic lesion, which is incompletely characterized. Recommend  follow-up with PCP in one week to have pelvic ultrasound to further assess.     Concern for PMR  Patient has been taking prednisone for possible PMR.  Patient was evaluated by rheumatologist who felt diagnosis of PMR is unlikely and recommends tapering off prednisone. Currently, she is on prednisone 5 mg daily with plans to taper to 2.5 mg daily in the next 1 week.          Consultations This Hospital Stay   GASTROENTEROLOGY IP CONSULT  COLORECTAL SURGERY IP CONSULT  PHYSICAL THERAPY ADULT IP CONSULT  OCCUPATIONAL THERAPY ADULT IP CONSULT  CARE MANAGEMENT / SOCIAL WORK IP CONSULT    Code Status   Full Code    Time Spent on this Encounter   I, Umair Jung MD, personally saw the patient today and spent greater than 30 minutes discharging this patient.       Umair Jung MD  [facility]TEND[facility]OV[facility] SHORT STAY  [address] [place] [id number]  Phone: [phone]  Fax: [phone]  ______________________________________________________________________    Physical Exam   Vital Signs: Temp: 98  F (36.7  C) Temp src: Oral BP: 131/63 Pulse: 87   Resp: 16 SpO2: 94 % O2 Device: None (Room air) Oxygen Delivery: 2 LPM  Weight: 180 lbs 15.96 oz    General appearance: not in acute distress  HEENT: PERRL, EOMI  Lungs: Clear breath sounds in  bilateral lung fields  Cardiovascular: Regular rate and rhythm, normal S1-S2  Abdomen: Soft, periumbilical tenderness, no distension  Musculoskeletal: No joint swelling  Skin: No rash and no edema  Neurology: AAO ×3.  Cranial nerves II - XII normal.  Normal muscle strength in all four extremities.        Primary Care Physician   [facility]    Discharge Orders      Home Care Referral      Reason for your hospital stay    Abdominal pain     Activity    Your activity upon discharge: activity as tolerated     Follow-up and recommended labs and tests     * CT scan shows that you have a 4.2 cm left adnexal mass. The nature of this mass is not clear. Follow up with your primary care provider, within 1 week, for further evaluation.  A pelvic ultrasound or transvaginal ultrasound is often recommended.  * Follow up with colorectal surgery in one week.  Dr Kesha Cortez  [facility]  Tel: [phone]     Diet    Follow this diet upon discharge: Regular Diet Adult       Significant Results and Procedures   Most Recent 3 CBC's:  Recent Labs   Lab Test 09/30/24  0759   WBC 15.9*   HGB 10.1*   MCV 92        Most Recent 3 BMP's:  Recent Labs   Lab Test 10/01/24  1002 10/01/24  0611 09/30/24  0759   NA  --   --  137   POTASSIUM  --  4.0 4.2   CHLORIDE  --   --  100   CO2  --   --  26   BUN  --   --  11.5   CR  --   --  0.76   ANIONGAP  --   --  11   MAYRA  --   --  8.6*   *  --  125*     Most Recent 2 LFT's:  Recent Labs   Lab Test 09/30/24  0759   AST 17   ALT 14   ALKPHOS 75   BILITOTAL 0.5   ,   Results for orders placed or performed during the hospital encounter of 09/30/24   CT Abdomen Pelvis w Contrast     Value    Radiologist flags Colonic mass (Urgent)    Narrative    EXAM: CT ABDOMEN PELVIS W CONTRAST  LOCATION: [facility]  DATE: 9/30/2024    INDICATION: Periumbilical abdominal pain  COMPARISON: None.  TECHNIQUE: CT scan of the abdomen and pelvis  was performed following injection of IV contrast. Multiplanar reformats were obtained. Dose reduction techniques were used.  CONTRAST: IsoVue 370 90mL    FINDINGS:   LOWER CHEST: Dependent atelectasis.    HEPATOBILIARY: Normal liver contours. Subcentimeter low-attenuation lesion in segment 5 is incompletely characterized though favored to represent a benign cyst/hemangioma. Gallbladder is unremarkable. No biliary ductal dilation.    PANCREAS: Normal.    SPLEEN: Normal.    ADRENAL GLANDS: Normal.    KIDNEYS/BLADDER: The kidneys enhance symmetrically. No urolithiasis or hydronephrosis. Trabeculated bladder contours are likely sequela of chronic outflow compromise.    BOWEL: There is annular irregular thickening of an approximately 8 cm segment of the ascending colon at the hepatic flexure with adjacent stranding/inflammation. No abscess or organized fluid collection.    Remainder of small and large bowel is normal in caliber. Colonic diverticulosis. Normal appendix.    LYMPH NODES: 1.4 cm rounded low-attenuation mesenteric lymph node adjacent to the SMV (3/76).    VASCULATURE: Abdominal aorta is nonaneurysmal with moderate atheromatous disease.    PELVIC ORGANS: Uterus is present. 4.2 x 3.5 x 3.6 cm cystic lesion in the left adnexa. Trace amount of adjacent free fluid.    MUSCULOSKELETAL: Degenerative disc disease and facet osteoarthritis throughout the visualized spine. No worrisome bone lesions.      Impression    IMPRESSION:   1.  Irregular masslike wall thickening of the hepatic flexure with adjacent inflammatory changes, suspicious for primary colonic neoplasm. No abscess or perforation. GI consultation for consideration of endoscopy is recommended.  2.  Prominent mesenteric lymph node adjacent to the SMV, suspicious for mora disease.  3.  No convincing evidence of solid organ metastasis in the abdomen or pelvis.  4.  4.2 cm left adnexal cystic lesion, which is incompletely characterized. Recommend nonemergent  follow-up pelvic ultrasound to further assess (if within patient goals of care).        [Urgent Result: Colonic mass]    Finding was identified on 9/30/2024 9:59 AM CDT.     Dr. Us was contacted by me on 9/30/2024 10:23 AM CDT and verbalized understanding of the result.       Discharge Medications   Current Discharge Medication List        START taking these medications    Details   !! acetaminophen (TYLENOL) 325 MG tablet Take 2 tablets (650 mg) by mouth 3 times daily.    Associated Diagnoses: Colonic mass      !! acetaminophen (TYLENOL) 325 MG tablet Take 2 tablets (650 mg) by mouth every 8 hours as needed for mild pain.    Associated Diagnoses: Colonic mass      oxyCODONE (ROXICODONE) 5 MG tablet Take 0.5-1 tablets (2.5-5 mg) by mouth every 6 hours as needed for moderate to severe pain.  Qty: 15 tablet, Refills: 0    Associated Diagnoses: Colonic mass       !! - Potential duplicate medications found. Please discuss with provider.        CONTINUE these medications which have NOT CHANGED    Details   amLODIPine (NORVASC) 10 MG tablet Take 10 mg by mouth daily.      calcium citrate-vitamin D (CITRACAL) 200-6.25 MG-MCG TABS per tablet Take 1 tablet by mouth daily.      clopidogrel (PLAVIX) 75 MG tablet Take 75 mg by mouth daily.      famotidine (PEPCID) 20 MG tablet Take 20 mg by mouth at bedtime.      ferrous sulfate (FEROSUL) 325 (65 Fe) MG tablet Take 325 mg by mouth daily.      losartan-hydrochlorothiazide (HYZAAR) 100-25 MG tablet Take 1 tablet by mouth daily.      ondansetron (ZOFRAN ODT) 4 MG ODT tab Take 4 mg by mouth every 12 hours as needed for nausea.      !! predniSONE (DELTASONE) 2.5 MG tablet Take 2.5 mg by mouth daily.      !! predniSONE (DELTASONE) 5 MG tablet Take 5 mg by mouth daily.       !! - Potential duplicate medications found. Please discuss with provider.        Allergies   No Known Allergies

## 2024-10-02 NOTE — PROGRESS NOTES
Physical Therapy Discharge Summary    Reason for therapy discharge:    Discharged to home with home therapy.    Progress towards therapy goal(s). See goals on Care Plan in [facility] electronic health record for goal details.  Goals not met.  Barriers to achieving goals:   discharge on same date as initial evaluation.    Therapy recommendation(s):    Continued therapy is recommended.  Rationale/Recommendations:  Patient would benefit from home PT evaluation for general mobility and strength training.

## 2024-10-02 NOTE — PROGRESS NOTES
[facility]  Daily Progress Note    Subjective  Feeling better today, denies any abdominal pain or cramping. No nausea. Tolerating diet without issues. Eager to discharge today.     Colonoscopy yesterday with likely malignant partially obstructing tumor at the hepatic flexure, biopsies pending.     Objective  Intake/Output last 24 hrs:    Intake/Output Summary (Last 24 hours) at 10/1/2024 1107  Last data filed at 10/1/2024 0813  Gross per 24 hour   Intake 3400 ml   Output --   Net 3400 ml     Temp:  [97.7  F (36.5  C)-98.5  F (36.9  C)] 98  F (36.7  C)  Pulse:  [] 87  Resp:  [16-20] 16  BP: (131-149)/(58-66) 131/63  SpO2:  [89 %-98 %] 94 %    Physical Exam:  General: awake, alert, sitting on commode, in no acute distress  Head: normocephalic, atraumatic  Respiratory: non-labored breathing  Abdomen: Soft, nontender and nondistened.   Skin: No rashes or lesions  Musculoskeletal: moves all four extremities equally  Psychological: alert and oriented, answers questions appropriately    Pertinent Labs  Lab Results: personally reviewed.  Lab Results   Component Value Date     09/30/2024    CO2 26 09/30/2024    BUN 11.5 09/30/2024     Lab Results   Component Value Date    WBC 15.9 09/30/2024    HGB 10.1 09/30/2024    HCT 32.0 09/30/2024    MCV 92 09/30/2024     09/30/2024       Assessment/Plan: 89 y/o F admitted for mass like wall thickening at hepatic flexure with suspicious lymph nodes on CT after evaluation of fall. Colonoscopy yesterday with likely malignant partially obstructing tumor.     -Will need staging with CT chest and CEA, will complete outpatient   -Close outpatient follow-up to discuss next steps and possible surgical intervention pending GO  -OK from CRS standpoint for discharge, will help to setup outpatient staging and follow-up    Discussed with MARCUS LawC  [facility]  [phone]..............................main

## 2024-10-02 NOTE — PROVIDER NOTIFICATION
"Pt was trying to transfer self from bed and fell-Pt denies hitting head. Now back in bed. VSS on RA. Denies Pain. Thanks    Jean Carlos text paged at 2000 via Where Was it Filmed.     MD responded with acknowledgment of fall \"ok\".    Primary nurse to update family.   "

## 2024-10-02 NOTE — CONSULTS
Care Management Discharge Note    Discharge Date: 10/02/2024       Discharge Disposition: Home Care    Discharge Services: None    Discharge DME:      Discharge Transportation: family or friend will provide    Private pay costs discussed: Not applicable    Does the patient's insurance plan have a 3 day qualifying hospital stay waiver?  No    PAS Confirmation Code:    Patient/family educated on Medicare website which has current facility and service quality ratings: yes    Education Provided on the Discharge Plan: Yes  Persons Notified of Discharge Plans: yes  Patient/Family in Agreement with the Plan: yes    Handoff Referral Completed: No, handoff not indicated or clinically appropriate    Additional Information:  BALBINA met with pt to introduce role of CM, complete  initial assessment, and to discuss needs at time of d/c. Pt comes from home; [facility], and noted to be independent at baseline with walker. Pt agreeable to home care recommendations; no preference on agency (referral made and pending for RN PT OT). Pt feels that there will be no additional needs from CM at discharge, and will follow with PCP if needs arise post discharge.    CM to continue to follow through hospitalization and for home care agency  11:44 AM    [facility] accepted. BALBINA met with pt to discuss, and confirm discharge plan to home with home care services; RN PT OT. All parties aware, and agreeable to discharge plan.  Inc intake notified of discharge. No other needs from CM at this time. Family to transport.  11:48 AM    Brenna Kjellberg, BSW LSW  10/2/2024

## 2024-10-02 NOTE — PROGRESS NOTES
Discharge packet was given to and reviewed with pt and pt's daughter. All questions and concerns addressed. Pt's daughter to transport home.

## 2024-10-02 NOTE — PLAN OF CARE
PRIMARY DIAGNOSIS: ACUTE PAIN  OUTPATIENT/OBSERVATION GOALS TO BE MET BEFORE DISCHARGE:  1. Pain Status: Pain free.    2. Return to near baseline physical activity: No    3. Cleared for discharge by consultants (if involved): No    Discharge Planner Nurse   Safe discharge environment identified: Yes  Barriers to discharge: Yes       Entered by: Tatyana Gruber RN 10/01/2024 8:42 PM     Please review provider order for any additional goals.   Nurse to notify provider when observation goals have been met and patient is ready for discharge.         Pt A&Ox4, forgetful at times. Denies pain. Tolerated regular diet. Up to bedside commode with assist of 1.

## 2024-10-02 NOTE — PROGRESS NOTES
[facility]  OUTPATIENT PHYSICAL THERAPY EVALUATION  PLAN OF TREATMENT FOR OUTPATIENT REHABILITATION  (COMPLETE FOR INITIAL CLAIMS ONLY)  Patient's Last Name, First Name, M.I.  YOB: 1928  Susan Fair                           Provider's Name  [facility] Medical Record No.  [id number]                             Onset Date:  09/30/24   Start of Care Date:  10/02/24   Type:     _X_PT   ___OT   ___SLP Medical Diagnosis:  Colonic mass              PT Diagnosis:  Impaired functional mobility Visits from SOC:  1     See note for plan of treatment, functional goals and certification details    I CERTIFY THE NEED FOR THESE SERVICES FURNISHED UNDER        THIS PLAN OF TREATMENT AND WHILE UNDER MY CARE     (Physician co-signature of this document indicates review and certification of the therapy plan).                10/02/24 0800   Appointment Info   Signing Clinician's Name / Credentials (PT) Simona Doshi, PT, DPT   Quick Adds   Quick Adds Certification   Living Environment   People in Home alone   Current Living Arrangements independent living facility   Home Accessibility no concerns   Transportation Anticipated family or friend will provide  (Daughter)   Self-Care   Usual Activity Tolerance good   Current Activity Tolerance good   Equipment Currently Used at Home walker, rolling  (4WW)   Fall history within last six months yes   Number of times patient has fallen within last six months 1   Activity/Exercise/Self-Care Comment Patient uses a 4WW at baseline.  Patient fell while in the hospital.   General Information   Onset of Illness/Injury or Date of Surgery 09/30/24   Referring Physician Umair Jung MD   Patient/Family Therapy Goals Statement (PT) Return home   Pertinent History of Current Problem (include personal factors and/or comorbidities that impact the POC) Colonic mass   Existing Precautions/Restrictions fall   Range of Motion (ROM)    Range of Motion ROM is WFL   Strength (Manual Muscle Testing)   Strength (Manual Muscle Testing) strength is WFL   Bed Mobility   Comment, (Bed Mobility) Patient up in recliner.   Transfers   Transfers sit-stand transfer   Sit-Stand Transfer   Sit-Stand [place] (Transfers) supervision   Assistive Device (Sit-Stand Transfers) walker, front-wheeled   Gait/Stairs (Locomotion)   [place] Level (Gait) supervision   Assistive Device (Gait) walker, front-wheeled   Distance in Feet (Gait) 10'   Pattern (Gait) step-through   Deviations/Abnormal Patterns (Gait) andriy decreased   Clinical Impression   Criteria for Skilled Therapeutic Intervention Yes, treatment indicated   PT Diagnosis (PT) Impaired functional mobility   Influenced by the following impairments Generalized weakness due to inactivity   Functional limitations due to impairments [place], gait   Clinical Presentation (PT Evaluation Complexity) stable   Clinical Presentation Rationale Patient presents as medically diagnosed.   Clinical Decision Making (Complexity) low complexity   Planned Therapy Interventions (PT) gait training;home exercise program;patient/family education;strengthening;transfer training;progressive activity/exercise;home program guidelines   Risk & Benefits of therapy have been explained evaluation/treatment results reviewed;care plan/treatment goals reviewed;patient   PT Total Evaluation Time   PT Eval, Low Complexity Minutes (36497) 8   Therapy Certification   Start of care date 10/02/24   Certification date from 10/02/24   Certification date to 10/09/24   Medical Diagnosis Colonic mass   Physical Therapy Goals   PT Frequency Daily   PT Predicted Duration/Target Date for Goal Attainment 10/09/24   PT Goals Transfers;Gait   PT: Transfers Modified independent;Sit to/from stand;Assistive device  (4WW)   PT: Gait Modified independent;Assistive device;Greater than 200 feet  (4WW)   Interventions   Interventions Quick Adds Gait  Training   Gait Training   Gait Training Minutes (62138) 8   Symptoms Noted During/After Treatment (Gait Training) fatigue   Treatment Detail/Skilled Intervention Patient continued ambulation in hallway with FWW. RLE externally rotated, which is baseline per patient due to history of Polio and R-sided weakness. Slow, but steady gait observed with onset of BLE fatigue after 200'.   Distance in Feet Additional 210'   [place] Level (Gait Training) stand-by assist   Physical Assistance Level (Gait Training) supervision   Weight Bearing (Gait Training) full weight-bearing   Assistive Device (Gait Training) rolling walker  (FWW)   PT Discharge Planning   PT Plan Bring 4WW for transfers and gait training   PT Discharge Recommendation (DC Rec) home with home care physical therapy   PT Rationale for DC Rec Patient would benefit from home PT evaluation for  general mobility and strength training.   PT Brief overview of current status SBA with FWW x 220' in total.   PT Equipment Needed at Discharge walker, rolling  (4WW)   Total Session Time   Timed Code Treatment Minutes 8   Total Session Time (sum of timed and untimed services) 16

## 2024-10-02 NOTE — PROGRESS NOTES
"   10/02/24 0746   Appointment Info   Signing Clinician's Name / Credentials (OT) Brianda Caroline, OTR/L   Quick Adds   Quick Adds Certification   Living Environment   People in Home facility resident   Current Living Arrangements independent living facility   Home Accessibility no concerns   Self-Care   Equipment Currently Used at Home walker, rolling  (4WW)   Fall history within last six months yes   Number of times patient has fallen within last six months 2   Activity/Exercise/Self-Care Comment Independent with ADLs.   Instrumental Activities of Daily Living (IADL)   IADL Comments Independent with laundry, has housekeeping services 1x/week, gets 10 meals from facility otherwise independent with cooking.   General Information   Onset of Illness/Injury or Date of Surgery 09/30/24   Referring Physician Umair Jung MD   Patient/Family Therapy Goal Statement (OT) to go home with daughter's support   Additional Occupational Profile Info/Pertinent History of Current Problem Per chart review, pt \"is a 89 year old female with history of hypertension, JAGDISH, hypercholesterolemia and prior CVA admitted on 9/30/24 due to mechanical fall and abdominal pain. Found to have colonic mass, likely malignant with 4.2 cm left adnexal cystic lesion and prominent mesenteric lymph node.\"   Existing Precautions/Restrictions fall   Cognitive Status Examination   Orientation Status orientation to person, place and time  (A&Ox4)   Cognitive Status Comments Pt reports some memory deficits at baseline.   Pain Assessment   Patient Currently in Pain No   Range of Motion Comprehensive   General Range of Motion no range of motion deficits identified   Strength Comprehensive (MMT)   Comment, General Manual Muscle Testing (MMT) Assessment Generalized BUE weakness noted functionally.   Bed Mobility   Bed Mobility supine-sit   Supine-Sit [place] (Bed Mobility) supervision   Assistive Device (Bed Mobility) bed rails   Transfers   Transfers " sit-stand transfer;toilet transfer   Sit-Stand Transfer   Sit-Stand [place] (Transfers) minimum assist (75% patient effort)   Assistive Device (Sit-Stand Transfers) walker, front-wheeled   Toilet Transfer   Type (Toilet Transfer) stand pivot/stand step   [place] Level (Toilet Transfer) minimum assist (75% patient effort)   Assistive Device (Toilet Transfer) commode chair;walker, front-wheeled   Balance   Balance Comments Pt initially required Min A for balance d/t weakness, used FWW.   Activities of Daily Living   BADL Assessment/Intervention lower body dressing;grooming   Lower Body Dressing Assessment/Training   Position (Lower Body Dressing) edge of bed sitting   Comment, (Lower Body Dressing) donning shoes   [place] Level (Lower Body Dressing) maximum assist (25% patient effort)   Grooming Assessment/Training   [place] Level (Grooming) not tested   Comment, (Grooming) Per clinical reasoning, anticipate pt would require Ax1 for standing G/H, fatigues quickly.   Clinical Impression   Criteria for Skilled Therapeutic Interventions Met (OT) Yes, treatment indicated   OT Diagnosis Impaired ability to perform ADLs, IADLs, and functional mobility.   Influenced by the following impairments colonic mass   OT Problem List-Impairments impacting ADL problems related to;activity tolerance impaired;balance;mobility;strength   Assessment of Occupational Performance 1-3 Performance Deficits   Identified Performance Deficits toileting, grooming/hygiene, lower body dressing   Planned Therapy Interventions (OT) ADL retraining;IADL retraining;balance training;strengthening;transfer training;progressive activity/exercise;home program guidelines;risk factor education   Clinical Decision Making Complexity (OT) problem focused assessment/low complexity   Risk & Benefits of therapy have been explained evaluation/treatment results reviewed;care plan/treatment goals reviewed;risks/benefits reviewed;current/potential  barriers reviewed;participants voiced agreement with care plan;participants included;patient   OT Total Evaluation Time   OT Eval, High Complexity Minutes (79033) 15   Therapy Certification   Medical Diagnosis colonic mass   Start of Care Date 10/02/24   Certification date from 10/02/24   Certification date to 10/09/24   OT Goals   Therapy Frequency (OT) Daily   OT Predicted Duration/Target Date for Goal Attainment 10/09/24   OT Goals Hygiene/Grooming;Lower Body Dressing;Toilet Transfer/Toileting   OT: Hygiene/Grooming modified independent;using adaptive equipment;while standing   OT: Lower Body Dressing Modified independent;using adaptive equipment   OT: Toilet Transfer/Toileting Modified independent;toilet transfer;cleaning and garment management;using adaptive equipment   Self-Care/Home Management   Self-Care/Home Mgmt/ADL, Compensatory, Meal Prep Minutes (12181) 10   Symptoms Noted During/After Treatment (Meal Preparation/Planning Training) fatigue   Treatment Detail/Skilled Intervention Completed additional transfer BSC>EOB with CGA using FWW. Pt ambulated 4' to recliner with Min A using FWW. Pt with poor control in descent stand>sit throughout session, provided cueing for hand placement with decreased follow-through needed. Pt completed additional transfer recliner<>EOB to progress functional mobility, progressed to CGA for ambulation. Additional ambulation in room 10' using FWW, CGA with no LOB. Pt left in recliner at end of session with chair alarm on and call light in reach, NA present in room.   OT Discharge Planning   OT Plan progress functional mobility in room, standing G/H at sink, lower body dressing, toileting   OT Discharge Recommendation (DC Rec) Transitional Care Facility   OT Rationale for DC Rec Pt currently requiring Ax1 for all ADLs and functional mobility, does not have assist at ILF. Pt making good progress even throughout session, may progress to home discharge pending progress, however d/t  risk of falls and weakness TCU safest discharge plan at this time.   OT Brief overview of current status Progressed to CGA short distance functional mobility, Max A lower body dressing, Min A toileting   Total Session Time   Timed Code Treatment Minutes 10   Total Session Time (sum of timed and untimed services) 25      [facility]  OUTPATIENT OCCUPATIONAL THERAPY  EVALUATION  PLAN OF TREATMENT FOR OUTPATIENT REHABILITATION  (COMPLETE FOR INITIAL CLAIMS ONLY)  Patient's Last Name, First Name, M.I.  YOB: 1928  Susan Fair                             Provider's Name  [facility] Medical Record No.  [id number]                             Onset Date:  09/30/24   Start of Care Date:  10/02/24   Type:     ___PT   _X_OT   ___SLP Medical Diagnosis:  colonic mass                    OT Diagnosis:  Impaired ability to perform ADLs, IADLs, and functional mobility. Visits from SOC:  1     See note for plan of treatment, functional goals and certification details    I CERTIFY THE NEED FOR THESE SERVICES FURNISHED UNDER        THIS PLAN OF TREATMENT AND WHILE UNDER MY CARE     (Physician co-signature of this document indicates review and certification of the therapy plan).

## 2024-10-02 NOTE — PLAN OF CARE
PRIMARY DIAGNOSIS: ACUTE PAIN  OUTPATIENT/OBSERVATION GOALS TO BE MET BEFORE DISCHARGE:  1. Pain Status: Pain free.    2. Return to near baseline physical activity: No    3. Cleared for discharge by consultants (if involved): No    Discharge Planner Nurse   Safe discharge environment identified: No  Barriers to discharge: Yes       Entered by: Tatyana Gruber RN 10/01/2024 10:13 PM     Please review provider order for any additional goals.   Nurse to notify provider when observation goals have been met and patient is ready for discharge.       Pt A&Ox4. Forgetful at times. Denies pain, scheduled Tylenol and Oxycodone given. No IV access, ok per Dr. Jung. Tolerating regular food.

## 2024-10-02 NOTE — PLAN OF CARE
Occupational Therapy Discharge Summary    Reason for therapy discharge:    Discharged to home with home therapy.    Progress towards therapy goal(s). See goals on Care Plan in [facility] electronic health record for goal details.  Goals partially met.  Barriers to achieving goals:   discharge from facility.    Therapy recommendation(s):    Continued therapy is recommended.  Rationale/Recommendations:  recommend home care OT services.    Brianda Velazquez OTR/STEVEN 10/2/24

## 2024-10-02 NOTE — PLAN OF CARE
PRIMARY DIAGNOSIS: ACUTE PAIN  OUTPATIENT/OBSERVATION GOALS TO BE MET BEFORE DISCHARGE:  1. Pain Status: Improved-controlled with oral pain medications.    2. Return to near baseline physical activity: Yes    3. Cleared for discharge by consultants (if involved): Yes    Discharge Planner Nurse   Safe discharge environment identified: Yes  Barriers to discharge: Yes       Entered by: Georgie Salgado RN 10/02/2024 2:43 AM     Please review provider order for any additional goals.   Nurse to notify provider when observation goals have been met and patient is ready for discharge.Goal Outcome Evaluation:

## 2024-10-02 NOTE — CARE PLAN
10/01/24 2000   Fall Event   Patient Assessed By nurse  (MD notified. Dr Evangelista)   Name of Provider Notified Jean Carlos   Fall Prevention Plan Updated Yes  (Pt in bed currently with BA on.)

## 2024-10-02 NOTE — PLAN OF CARE
Problem: Adult Inpatient Plan of Care  Goal: Optimal Comfort and Wellbeing  Outcome: Progressing  Intervention: Monitor Pain and Promote Comfort  Recent Flowsheet Documentation  Taken 10/2/2024 0359 by Georgie Salgado RN  Pain Management Interventions:   rest   quiet environment facilitated  Taken 10/2/2024 0034 by Georgie Salgado RN  Pain Management Interventions:   rest   quiet environment facilitated     Problem: Pain Acute  Goal: Optimal Pain Control and Function  Outcome: Progressing  Intervention: Develop Pain Management Plan  Recent Flowsheet Documentation  Taken 10/2/2024 0359 by Georgie Salgado RN  Pain Management Interventions:   rest   quiet environment facilitated  Taken 10/2/2024 0034 by Georgie Salgado RN  Pain Management Interventions:   rest   quiet environment facilitated  Intervention: Prevent or Manage Pain  Recent Flowsheet Documentation  Taken 10/2/2024 0400 by Georgie Salgado RN  Medication Review/Management: medications reviewed  Taken 10/2/2024 0035 by Georgie Salgado RN  Medication Review/Management: medications reviewed     Problem: Adult Inpatient Plan of Care  Goal: Absence of Hospital-Acquired Illness or Injury  Intervention: Prevent Skin Injury  Recent Flowsheet Documentation  Taken 10/2/2024 0400 by Georgie Salgado RN  Body Position: position changed independently  Taken 10/2/2024 0035 by Georgie Salgado RN  Body Position: position changed independently   Goal Outcome Evaluation:       Patient admitted for colonic mass. Had colonoscopy yesterday. Denies pain throughout the night. Vitally stable. RA. AOX4. Little forgetful. No IV access. PT/OT consult. Assist of 1 with mobility. Used bedside commode once during the night. Regular diet. Slept well.

## 2024-10-03 ENCOUNTER — APPOINTMENT (OUTPATIENT)
Dept: RADIOLOGY | Facility: HOSPITAL | Age: 89
DRG: 309 | End: 2024-10-03
Attending: STUDENT IN AN ORGANIZED HEALTH CARE EDUCATION/TRAINING PROGRAM
Payer: COMMERCIAL

## 2024-10-03 ENCOUNTER — MEDICAL CORRESPONDENCE (OUTPATIENT)
Dept: MEDSURG UNIT | Facility: HOSPITAL | Age: 89
End: 2024-10-03

## 2024-10-03 ENCOUNTER — HOSPITAL ENCOUNTER (INPATIENT)
Facility: HOSPITAL | Age: 89
LOS: 5 days | Discharge: NURSING, REHAB OR LONG-TERM CARE | DRG: 309 | End: 2024-10-09
Attending: STUDENT IN AN ORGANIZED HEALTH CARE EDUCATION/TRAINING PROGRAM | Admitting: INTERNAL MEDICINE
Payer: COMMERCIAL

## 2024-10-03 DIAGNOSIS — C18.3 MALIGNANT NEOPLASM OF HEPATIC FLEXURE (H): Primary | ICD-10-CM

## 2024-10-03 DIAGNOSIS — I48.91 ATRIAL FIBRILLATION WITH RVR (H): ICD-10-CM

## 2024-10-03 LAB
ANION GAP SERPL CALCULATED.3IONS-SCNC: 10 MMOL/L (ref 7–15)
BASOPHILS # BLD AUTO: 0 10E3/UL (ref 0–0.2)
BASOPHILS NFR BLD AUTO: 0 %
BUN SERPL-MCNC: 12.8 MG/DL (ref 8–23)
CALCIUM SERPL-MCNC: 8.3 MG/DL (ref 8.8–10.4)
CEA SERPL-MCNC: 128 NG/ML
CHLORIDE SERPL-SCNC: 97 MMOL/L (ref 98–107)
CREAT SERPL-MCNC: 0.61 MG/DL (ref 0.51–0.95)
EGFRCR SERPLBLD CKD-EPI 2021: 82 ML/MIN/1.73M2
EOSINOPHIL # BLD AUTO: 0 10E3/UL (ref 0–0.7)
EOSINOPHIL NFR BLD AUTO: 0 %
ERYTHROCYTE [DISTWIDTH] IN BLOOD BY AUTOMATED COUNT: 13.6 % (ref 10–15)
GLUCOSE SERPL-MCNC: 115 MG/DL (ref 70–99)
HCO3 SERPL-SCNC: 25 MMOL/L (ref 22–29)
HCT VFR BLD AUTO: 27.8 % (ref 35–47)
HGB BLD-MCNC: 8.7 G/DL (ref 11.7–15.7)
HOLD SPECIMEN: NORMAL
IMM GRANULOCYTES # BLD: 0 10E3/UL
IMM GRANULOCYTES NFR BLD: 0 %
LAB DIRECTOR COMMENTS: NORMAL
LAB DIRECTOR DISCLAIMER: NORMAL
LAB DIRECTOR INTERPRETATION: NORMAL
LAB DIRECTOR METHODOLOGY: NORMAL
LAB DIRECTOR RESULTS: NORMAL
LYMPHOCYTES # BLD AUTO: 1.2 10E3/UL (ref 0.8–5.3)
LYMPHOCYTES NFR BLD AUTO: 13 %
MCH RBC QN AUTO: 28.5 PG (ref 26.5–33)
MCHC RBC AUTO-ENTMCNC: 31.3 G/DL (ref 31.5–36.5)
MCV RBC AUTO: 91 FL (ref 78–100)
MONOCYTES # BLD AUTO: 0.4 10E3/UL (ref 0–1.3)
MONOCYTES NFR BLD AUTO: 4 %
NEUTROPHILS # BLD AUTO: 7.5 10E3/UL (ref 1.6–8.3)
NEUTROPHILS NFR BLD AUTO: 82 %
NRBC # BLD AUTO: 0 10E3/UL
NRBC BLD AUTO-RTO: 0 /100
PATH REPORT.ADDENDUM SPEC: ABNORMAL
PATH REPORT.COMMENTS IMP SPEC: ABNORMAL
PATH REPORT.COMMENTS IMP SPEC: YES
PATH REPORT.FINAL DX SPEC: ABNORMAL
PATH REPORT.GROSS SPEC: ABNORMAL
PATH REPORT.MICROSCOPIC SPEC OTHER STN: ABNORMAL
PATH REPORT.RELEVANT HX SPEC: ABNORMAL
PATHOLOGY SYNOPTIC REPORT: ABNORMAL
PHOTO IMAGE: ABNORMAL
PLATELET # BLD AUTO: 286 10E3/UL (ref 150–450)
POTASSIUM SERPL-SCNC: 3.7 MMOL/L (ref 3.4–5.3)
RBC # BLD AUTO: 3.05 10E6/UL (ref 3.8–5.2)
SODIUM SERPL-SCNC: 132 MMOL/L (ref 135–145)
SPECIMEN DESCRIPTION: NORMAL
TROPONIN T SERPL HS-MCNC: 18 NG/L
TROPONIN T SERPL HS-MCNC: 19 NG/L
WBC # BLD AUTO: 9.1 10E3/UL (ref 4–11)

## 2024-10-03 PROCEDURE — 99418 PROLNG IP/OBS E/M EA 15 MIN: CPT | Mod: FS

## 2024-10-03 PROCEDURE — 80048 BASIC METABOLIC PNL TOTAL CA: CPT | Performed by: STUDENT IN AN ORGANIZED HEALTH CARE EDUCATION/TRAINING PROGRAM

## 2024-10-03 PROCEDURE — 85025 COMPLETE CBC W/AUTO DIFF WBC: CPT | Performed by: STUDENT IN AN ORGANIZED HEALTH CARE EDUCATION/TRAINING PROGRAM

## 2024-10-03 PROCEDURE — G0378 HOSPITAL OBSERVATION PER HR: HCPCS

## 2024-10-03 PROCEDURE — 99285 EMERGENCY DEPT VISIT HI MDM: CPT

## 2024-10-03 PROCEDURE — 250N000013 HC RX MED GY IP 250 OP 250 PS 637

## 2024-10-03 PROCEDURE — 36415 COLL VENOUS BLD VENIPUNCTURE: CPT | Performed by: STUDENT IN AN ORGANIZED HEALTH CARE EDUCATION/TRAINING PROGRAM

## 2024-10-03 PROCEDURE — 71046 X-RAY EXAM CHEST 2 VIEWS: CPT

## 2024-10-03 PROCEDURE — 96374 THER/PROPH/DIAG INJ IV PUSH: CPT

## 2024-10-03 PROCEDURE — 93005 ELECTROCARDIOGRAM TRACING: CPT | Performed by: STUDENT IN AN ORGANIZED HEALTH CARE EDUCATION/TRAINING PROGRAM

## 2024-10-03 PROCEDURE — 99291 CRITICAL CARE FIRST HOUR: CPT | Mod: 25

## 2024-10-03 PROCEDURE — 84484 ASSAY OF TROPONIN QUANT: CPT | Performed by: STUDENT IN AN ORGANIZED HEALTH CARE EDUCATION/TRAINING PROGRAM

## 2024-10-03 PROCEDURE — 250N000013 HC RX MED GY IP 250 OP 250 PS 637: Performed by: INTERNAL MEDICINE

## 2024-10-03 PROCEDURE — 999N000157 HC STATISTIC RCP TIME EA 10 MIN

## 2024-10-03 PROCEDURE — 250N000009 HC RX 250: Performed by: STUDENT IN AN ORGANIZED HEALTH CARE EDUCATION/TRAINING PROGRAM

## 2024-10-03 PROCEDURE — 99207 PR APP CREDIT; MD BILLING SHARED VISIT: CPT | Performed by: INTERNAL MEDICINE

## 2024-10-03 PROCEDURE — 99223 1ST HOSP IP/OBS HIGH 75: CPT | Mod: FS

## 2024-10-03 RX ORDER — FAMOTIDINE 20 MG/1
20 TABLET, FILM COATED ORAL AT BEDTIME
Status: DISCONTINUED | OUTPATIENT
Start: 2024-10-03 | End: 2024-10-09 | Stop reason: HOSPADM

## 2024-10-03 RX ORDER — NALOXONE HYDROCHLORIDE 0.4 MG/ML
0.4 INJECTION, SOLUTION INTRAMUSCULAR; INTRAVENOUS; SUBCUTANEOUS
Status: DISCONTINUED | OUTPATIENT
Start: 2024-10-03 | End: 2024-10-09 | Stop reason: HOSPADM

## 2024-10-03 RX ORDER — NALOXONE HYDROCHLORIDE 0.4 MG/ML
0.2 INJECTION, SOLUTION INTRAMUSCULAR; INTRAVENOUS; SUBCUTANEOUS
Status: DISCONTINUED | OUTPATIENT
Start: 2024-10-03 | End: 2024-10-09 | Stop reason: HOSPADM

## 2024-10-03 RX ORDER — LOSARTAN POTASSIUM AND HYDROCHLOROTHIAZIDE 25; 100 MG/1; MG/1
1 TABLET ORAL DAILY
Status: DISCONTINUED | OUTPATIENT
Start: 2024-10-04 | End: 2024-10-03

## 2024-10-03 RX ORDER — ASPIRIN 81 MG
50 TABLET, DELAYED RELEASE (ENTERIC COATED) ORAL DAILY
COMMUNITY

## 2024-10-03 RX ORDER — ONDANSETRON 4 MG/1
4 TABLET, ORALLY DISINTEGRATING ORAL EVERY 12 HOURS PRN
Status: DISCONTINUED | OUTPATIENT
Start: 2024-10-03 | End: 2024-10-09 | Stop reason: HOSPADM

## 2024-10-03 RX ORDER — METOPROLOL TARTRATE 1 MG/ML
5 INJECTION, SOLUTION INTRAVENOUS EVERY 6 HOURS PRN
Status: COMPLETED | OUTPATIENT
Start: 2024-10-03 | End: 2024-10-04

## 2024-10-03 RX ORDER — ACETAMINOPHEN 325 MG/1
650 TABLET ORAL EVERY 8 HOURS PRN
Status: DISCONTINUED | OUTPATIENT
Start: 2024-10-03 | End: 2024-10-03

## 2024-10-03 RX ORDER — CALCIUM CARBONATE 500 MG/1
1000 TABLET, CHEWABLE ORAL 4 TIMES DAILY PRN
Status: DISCONTINUED | OUTPATIENT
Start: 2024-10-03 | End: 2024-10-09 | Stop reason: HOSPADM

## 2024-10-03 RX ORDER — LIDOCAINE 40 MG/G
CREAM TOPICAL
Status: DISCONTINUED | OUTPATIENT
Start: 2024-10-03 | End: 2024-10-09 | Stop reason: HOSPADM

## 2024-10-03 RX ORDER — LOSARTAN POTASSIUM AND HYDROCHLOROTHIAZIDE 25; 100 MG/1; MG/1
1 TABLET ORAL DAILY
Status: DISCONTINUED | OUTPATIENT
Start: 2024-10-04 | End: 2024-10-08

## 2024-10-03 RX ORDER — ACETAMINOPHEN 325 MG/1
650 TABLET ORAL AT BEDTIME
Status: DISCONTINUED | OUTPATIENT
Start: 2024-10-03 | End: 2024-10-09 | Stop reason: HOSPADM

## 2024-10-03 RX ORDER — ACETAMINOPHEN 325 MG/1
650 TABLET ORAL EVERY 6 HOURS PRN
Status: DISCONTINUED | OUTPATIENT
Start: 2024-10-03 | End: 2024-10-09 | Stop reason: HOSPADM

## 2024-10-03 RX ORDER — METOPROLOL TARTRATE 25 MG/1
25 TABLET, FILM COATED ORAL 2 TIMES DAILY
Status: DISCONTINUED | OUTPATIENT
Start: 2024-10-03 | End: 2024-10-04

## 2024-10-03 RX ORDER — CLOPIDOGREL BISULFATE 75 MG/1
75 TABLET ORAL DAILY
Status: DISCONTINUED | OUTPATIENT
Start: 2024-10-03 | End: 2024-10-09 | Stop reason: HOSPADM

## 2024-10-03 RX ORDER — AMLODIPINE BESYLATE 5 MG/1
10 TABLET ORAL DAILY
Status: DISCONTINUED | OUTPATIENT
Start: 2024-10-03 | End: 2024-10-03

## 2024-10-03 RX ORDER — METOPROLOL TARTRATE 1 MG/ML
5 INJECTION, SOLUTION INTRAVENOUS
Status: COMPLETED | OUTPATIENT
Start: 2024-10-03 | End: 2024-10-03

## 2024-10-03 RX ORDER — OXYCODONE HYDROCHLORIDE 5 MG/1
5 TABLET ORAL EVERY 4 HOURS PRN
Status: DISCONTINUED | OUTPATIENT
Start: 2024-10-03 | End: 2024-10-09 | Stop reason: HOSPADM

## 2024-10-03 RX ORDER — AMOXICILLIN 250 MG
2 CAPSULE ORAL 2 TIMES DAILY PRN
Status: DISCONTINUED | OUTPATIENT
Start: 2024-10-03 | End: 2024-10-09 | Stop reason: HOSPADM

## 2024-10-03 RX ORDER — ACETAMINOPHEN 325 MG/1
650 TABLET ORAL 3 TIMES DAILY
Status: DISCONTINUED | OUTPATIENT
Start: 2024-10-03 | End: 2024-10-03

## 2024-10-03 RX ORDER — PREDNISONE 5 MG/1
5 TABLET ORAL DAILY
Status: DISCONTINUED | OUTPATIENT
Start: 2024-10-04 | End: 2024-10-09 | Stop reason: HOSPADM

## 2024-10-03 RX ORDER — AMOXICILLIN 250 MG
1 CAPSULE ORAL 2 TIMES DAILY PRN
Status: DISCONTINUED | OUTPATIENT
Start: 2024-10-03 | End: 2024-10-09 | Stop reason: HOSPADM

## 2024-10-03 RX ORDER — DOCUSATE SODIUM 100 MG/1
100 CAPSULE, LIQUID FILLED ORAL DAILY
Status: DISCONTINUED | OUTPATIENT
Start: 2024-10-03 | End: 2024-10-09 | Stop reason: HOSPADM

## 2024-10-03 RX ORDER — FERROUS SULFATE 325(65) MG
325 TABLET ORAL DAILY
Status: DISCONTINUED | OUTPATIENT
Start: 2024-10-03 | End: 2024-10-09 | Stop reason: HOSPADM

## 2024-10-03 RX ORDER — METOPROLOL TARTRATE 25 MG/1
25 TABLET, FILM COATED ORAL 2 TIMES DAILY
Status: DISCONTINUED | OUTPATIENT
Start: 2024-10-03 | End: 2024-10-03

## 2024-10-03 RX ADMIN — ACETAMINOPHEN 650 MG: 325 TABLET ORAL at 21:03

## 2024-10-03 RX ADMIN — CLOPIDOGREL BISULFATE 75 MG: 75 TABLET ORAL at 16:53

## 2024-10-03 RX ADMIN — DOCUSATE SODIUM 100 MG: 100 CAPSULE, LIQUID FILLED ORAL at 16:53

## 2024-10-03 RX ADMIN — Medication 5 MG: at 23:46

## 2024-10-03 RX ADMIN — ACETAMINOPHEN 650 MG: 325 TABLET ORAL at 16:53

## 2024-10-03 RX ADMIN — FAMOTIDINE 20 MG: 20 TABLET ORAL at 21:03

## 2024-10-03 RX ADMIN — METOPROLOL TARTRATE 25 MG: 25 TABLET, FILM COATED ORAL at 21:02

## 2024-10-03 RX ADMIN — METOPROLOL TARTRATE 5 MG: 5 INJECTION INTRAVENOUS at 15:17

## 2024-10-03 RX ADMIN — METOPROLOL TARTRATE 5 MG: 5 INJECTION INTRAVENOUS at 15:07

## 2024-10-03 RX ADMIN — METOPROLOL TARTRATE 5 MG: 5 INJECTION INTRAVENOUS at 14:56

## 2024-10-03 RX ADMIN — METOPROLOL TARTRATE 25 MG: 25 TABLET, FILM COATED ORAL at 15:38

## 2024-10-03 RX ADMIN — Medication 325 MG: at 16:53

## 2024-10-03 ASSESSMENT — ACTIVITIES OF DAILY LIVING (ADL)
TOILETING_ASSISTANCE: TOILETING DIFFICULTY, ASSISTANCE 1 PERSON
ADLS_ACUITY_SCORE: 38
TOILETING: 0-->NOT TOILET TRAINED OR ASSISTANCE NEEDED (DEVELOPMENTALLY APPROPRIATE)
ADLS_ACUITY_SCORE: 38
ADLS_ACUITY_SCORE: 31
TOILETING: 1-->ASSISTANCE (EQUIPMENT/PERSON) NEEDED
ADLS_ACUITY_SCORE: 31
ADLS_ACUITY_SCORE: 38
ADLS_ACUITY_SCORE: 31
ADLS_ACUITY_SCORE: 31
ADLS_ACUITY_SCORE: 38
TOILETING_ISSUES: YES
ADLS_ACUITY_SCORE: 31
ADLS_ACUITY_SCORE: 38

## 2024-10-03 NOTE — PHARMACY-ADMISSION MEDICATION HISTORY
Pharmacist Admission Medication History    Admission medication history is complete. The information provided in this note is only as accurate as the sources available at the time of the update.    Information Source(s): Patient, Family member, and CareEverywhere/SureScripts via in-person    Pertinent Information:   Prednisone: patient is currently tapering, on 5 mg daily through 10/6 and due to start 2.5 mg daily on 10/7    Changes made to PTA medication list:  Added: Docusate, Benefiber  Deleted: None  Changed: None    Allergies reviewed with patient and updates made in EHR: yes    Medication History Completed By: SHERYL STEWARD [facility] 10/3/2024 3:38 PM    PTA Med List   Medication Sig Last Dose    acetaminophen (TYLENOL) 325 MG tablet Take 2 tablets (650 mg) by mouth 3 times daily. 10/3/2024 at 0800    acetaminophen (TYLENOL) 325 MG tablet Take 2 tablets (650 mg) by mouth every 8 hours as needed for mild pain. Past Month    amLODIPine (NORVASC) 10 MG tablet Take 10 mg by mouth daily. 10/2/2024 at 1200    calcium citrate-vitamin D (CITRACAL) 200-6.25 MG-MCG TABS per tablet Take 1 tablet by mouth daily. 10/2/2024 at 1200    clopidogrel (PLAVIX) 75 MG tablet Take 75 mg by mouth daily. 10/2/2024 at 1200    docusate sodium (COLACE) 100 MG tablet Take 100 mg by mouth daily. 10/2/2024 at 1200    famotidine (PEPCID) 20 MG tablet Take 20 mg by mouth at bedtime. 10/2/2024 at pm    ferrous sulfate (FEROSUL) 325 (65 Fe) MG tablet Take 325 mg by mouth daily. 10/2/2024 at 1200    losartan-hydrochlorothiazide (HYZAAR) 100-25 MG tablet Take 1 tablet by mouth daily. 10/2/2024 at 1200    ondansetron (ZOFRAN ODT) 4 MG ODT tab Take 4 mg by mouth every 12 hours as needed for nausea. Past Month    oxyCODONE (ROXICODONE) 5 MG tablet Take 0.5-1 tablets (2.5-5 mg) by mouth every 6 hours as needed for moderate to severe pain. 10/2/2024 at pm    [START ON 10/7/2024] predniSONE (DELTASONE) 2.5 MG tablet Take 2.5 mg by mouth daily.  at not  yet started    predniSONE (DELTASONE) 5 MG tablet Take 5 mg by mouth daily. 10/3/2024 at 0800    Wheat Dextrin (BENEFIBER PO) Take 1 Dose by mouth every morning. Past Week

## 2024-10-03 NOTE — H&P
[facility]    History and Physical - Hospitalist Service       Date of Admission:  10/3/2024    Assessment & Plan     Susan Fair is a 89 year old female with history of hypertension, JAGDISH, hypercholesterolemia and prior CVA admitted due to chest pain and atrial fibrillation RVR. Patient was discharged yesterday from hospital where she found to have colonic mass, likely malignant with 4.2 cm left adnexal cystic lesion and prominent mesenteric lymph node.  Colorectal consulted to discuss biopsy results with patient recommendations pending. The patient reports when she awoke this morning she felt fine, but for 1-2 hours she was having centralized chest pain. Her chest pain is now gone. Patient noted that she was coughing up phlegm this morning, and that after coughing she felt better.  EKG obtained in the ER patient in atrial fibrillation RVR rate in 130s.  Patient given 3 doses of IV metoprolol slowed heart rate down to 100s.  Patient denies chest pain shortness of breath or dizziness at this time. Metoprolol oral started.  Will monitor on cardiac telemetry.     # Chest pain  # Atrial fibrillation RVR  EKG obtained and reviewed  Rate 130-120  IV metoprolol 5 mg x 3 given in ED  Heart rate 100-110 after metoprolol  Metoprolol oral started  Hold amlodipine  Metoprolol IV 5 mg as needed for heart rate above 110  Troponin 19, second troponin trending down 18  Cardiac telemetry monitor    #Colonic mass-possibly malignant  Abdominal pain, improved  CT scan of the abdomen and pelvis showed irregular masslike wall thickening of the hepatic flexure with adjacent inflammatory changes, suspicious for primary colonic neoplasm for which Colonoscopy completed, with biopsy  Colorectal consulted discussed biopsy results with patient, recommendations pending  Analgesics as needed     #Hypertension  Monitor blood pressure, stable at this time  hold PTA amlodipine  Resume PTA losartan-hydrochlorothiazide for  "now    # Anemia, chronic   Hemoglobin 8.7, approximate patient's baseline 10  Resume ferrous sulfate  CBC in a.m.    #Concern for PMR  Patient was taking prednisone PTA for possible PMR.  Patient was evaluated by rheumatologist who felt diagnosis of PMR is unlikely and recommends tapering off prednisone.  Continue PTA prednisone 5 mg daily with plans to taper to 2.5 mg daily in the next 1 week      # Hyponatremia  Sodium 132 today, previous sodium 137 on 9/30  Trend BMP in a.m.       Diet: Combination Diet Regular Diet Adult    DVT Prophylaxis: Pneumatic Compression Devices  Golden Catheter: Not present  Lines: None     Cardiac Monitoring: ACTIVE order. Indication: Tachyarrhythmias, acute (48 hours)  Code Status: Full Code      Clinically Significant Risk Factors Present on Admission         # Hyponatremia: Lowest Na = 132 mmol/L in last 2 days, will monitor as appropriate        # Drug Induced Platelet Defect: home medication list includes an antiplatelet medication   # Hypertension: Noted on problem list    # Anemia: based on hgb <11       # Obesity: Estimated body mass index is 38.74 kg/m  as calculated from the following:    Height as of this encounter: 1.448 m (4' 9\").    Weight as of this encounter: 81.2 kg (179 lb).              Disposition Plan     Medically Ready for Discharge: Anticipated in 2-4 Days         The patient's care was discussed with the Attending Physician, Dr. Jung .    Domitila Tierney NP  Hospitalist Service  [facility]  Securely message with WUT (more info)  Text page via [facility] Paging/Directory     ______________________________________________________________________    Chief Complaint   Chest pain  Atrial fibrillation RVR    History is obtained from the patient, family, and chart    History of Present Illness     Susan Fair is a 89 year old female who presents for evaluation of chest pain. The patient reports when she awoke this morning she felt fine, but for 1-2 " hours she was having centralized chest pain.  Patient was found to be in rapid A-fib in ED. IV metoprolol given and her chest pain is now gone.  Patient states she does not have a history of atrial fibrillation. Patient noted that she was coughing up phlegm this morning, and that after coughing she felt better.  Patient denies shortness of breath or dizziness.  Patient denies nausea vomiting or diarrhea.  Patient states she had abdominal pain but is now resolved.  Patient denies blood in stool or vomit.  Patient denies urinary symptoms.  Patient denies fever chills.  Patient states she continues to feel fatigued with generalized weakness.     Past Medical History    Past Medical History:   Diagnosis Date    Cerebral infarction (H)     Sleep apnea        Past Surgical History   Past Surgical History:   Procedure Laterality Date    COLONOSCOPY      COLONOSCOPY N/A 10/1/2024    Procedure: COLONOSCOPY;  Surgeon: Alejandro Aguiar MD;  [place] [facility] [place]THOPEDIC SURGERY         Prior to Admission Medications   Prior to Admission Medications   Prescriptions Last Dose Informant Patient Reported? Taking?   acetaminophen (TYLENOL) 325 MG tablet   No No   Sig: Take 2 tablets (650 mg) by mouth 3 times daily.   acetaminophen (TYLENOL) 325 MG tablet   No No   Sig: Take 2 tablets (650 mg) by mouth every 8 hours as needed for mild pain.   amLODIPine (NORVASC) 10 MG tablet   Yes No   Sig: Take 10 mg by mouth daily.   calcium citrate-vitamin D (CITRACAL) 200-6.25 MG-MCG TABS per tablet   Yes No   Sig: Take 1 tablet by mouth daily.   clopidogrel (PLAVIX) 75 MG tablet   Yes No   Sig: Take 75 mg by mouth daily.   famotidine (PEPCID) 20 MG tablet   Yes No   Sig: Take 20 mg by mouth at bedtime.   ferrous sulfate (FEROSUL) 325 (65 Fe) MG tablet   Yes No   Sig: Take 325 mg by mouth daily.   losartan-hydrochlorothiazide (HYZAAR) 100-25 MG tablet   Yes No   Sig: Take 1 tablet by mouth daily.   ondansetron (ZOFRAN ODT) 4 MG  ODT tab   Yes No   Sig: Take 4 mg by mouth every 12 hours as needed for nausea.   oxyCODONE (ROXICODONE) 5 MG tablet   No No   Sig: Take 0.5-1 tablets (2.5-5 mg) by mouth every 6 hours as needed for moderate to severe pain.   predniSONE (DELTASONE) 2.5 MG tablet   Yes No   Sig: Take 2.5 mg by mouth daily.   predniSONE (DELTASONE) 5 MG tablet   Yes No   Sig: Take 5 mg by mouth daily.      Facility-Administered Medications: None        Review of Systems    The 10 point Review of Systems is negative other than noted in the HPI or here.     Social History   I have reviewed this patient's social history and updated it with pertinent information if needed.  Social History     Tobacco Use    Smoking status: Never    Smokeless tobacco: Never   Substance Use Topics    Alcohol use: Not Currently    Drug use: Never         Allergies   Allergies   Allergen Reactions    Ace Inhibitors Cough        Physical Exam   Vital Signs: Temp: 97.7  F (36.5  C) Temp src: Oral BP: 132/68 Pulse: 102   Resp: 22 SpO2: 93 % O2 Device: None (Room air)    Weight: 179 lbs 0 oz    Constitutional: awake, alert, cooperative, no apparent distress, and appears stated age  Hematologic / Lymphatic: no cervical lymphadenopathy and no supraclavicular lymphadenopathy  Respiratory: No increased work of breathing, good air exchange, clear to auscultation bilaterally, no crackles or wheezing  Cardiovascular: Normal apical impulse, regular rate and rhythm, normal S1 and S2, no S3 or S4, and no murmur noted  GI: No scars, normal bowel sounds, soft, non-distended, non-tender, no masses palpated, no hepatosplenomegally  Skin: no bruising or bleeding, normal skin color, texture, turgor, and no redness, warmth, or swelling  Musculoskeletal: There is no redness, warmth, or swelling of the joints.   Neurologic: Awake, alert, oriented to name, place and time.  Cranial nerves II-XII are grossly intact.  Motor is 5 out of 5 bilaterally.    Neuropsychiatric: General:  normal, calm, and normal eye contact    Medical Decision Making       75 MINUTES SPENT BY ME on the date of service doing chart review, history, exam, documentation & further activities per the note.      Data     I have personally reviewed the following data over the past 24 hrs:    9.1  \   8.7 (L)   / 286     132 (L) 97 (L) 12.8 /  115 (H)   3.7 25 0.61 \     Trop: 18 (H) BNP: N/A       Imaging results reviewed over the past 24 hrs:   Recent Results (from the past 24 hour(s))   Chest XR,  PA & LAT    Narrative    EXAM: XR CHEST 2 VIEWS  LOCATION: [facility]  DATE: 10/3/2024    INDICATION: Cough, chest pain. Fast heart rate.   COMPARISON: Chest radiograph 9/5/2011.       Impression    IMPRESSION:     Lung volumes are low. Streaky opacities at the right greater than left lung bases are favored to reflect atelectasis. Small bilateral pleural effusions. Questionable minimal interstitial edema. No pneumothorax.    Normal cardiac size. Aortic atherosclerotic calcifications.

## 2024-10-03 NOTE — ED PROVIDER NOTES
EMERGENCY DEPARTMENT ENCOUNTER       ED Course & Medical Decision Making   11:26 AM I met with the patient, obtained history, performed an initial exam, and discussed options and plan for diagnostics and treatment here in the ED.   2:51 PM Spoke with Dr Jung (hospitalist)    Final Impression  89 year old female presents for evaluation of chest pain cough, racing heart rate that happened acutely this morning.  States that she woke up in her normal state of health, though sometime this morning of your heart arrival developed some central chest pain, had a cough with some phlegm, though after coughing up phlegm felt much better.  Patient nontoxic-appearing at time of arrival, though is in A-fib with RVR.  No history of A-fib.  Patient was recently hospitalized here for a colonoscopy for what looks like unfortunately recurrence of possible rectal cancer, had biopsies done.  Hemoglobin was  10.1 preprocedure, is now down to 8.7.  Chest x-ray does not show any overt pneumonia, patient not febrile, normal white count.  Troponins 19 and then delta of 18.  Chest pain still totally resolved.  Will start on IV metoprolol and admit for new A-fib with RVR.  Patient has high [id number] score, though patient and daughter worried about potential bleeding risks on blood thinners do seem fairly apprehensive about it.    Prior to making a final disposition on this patient the results of patient's tests and other diagnostic studies were discussed with the patient. All questions were answered. Patient expressed understanding of the plan and was amenable to it.    Medical Decision Making  History:  Supplemental history from: daughter    Work Up:  Chart documentation includes differential considered and any EKGs or imaging independently interpreted by provider, where specified.    Complicating factors:  Care impacted by chronic illness: Cerebrovascular Disease, Hyperlipidemia, and Hypertension    Disposition considerations:  Admit.        Medications   metoprolol (LOPRESSOR) injection 5 mg (has no administration in time range)     Final Impression     1. Atrial fibrillation with RVR (H)      Critical Care     Performed by: Wyatt Trimble MD  Authorized by: Wyatt Trimble MD                   Total critical care time: 30 minutes  Critical care was necessary to treat or prevent imminent or life-threatening deterioration of the following conditions: A fib w RVR lasting >60 minutes  Critical care was time spent personally by me on the following activities: development of treatment plan with patient or surrogate, discussions with consultants, examination of patient, evaluation of patient's response to treatment, obtaining history from patient or surrogate, ordering and performing treatments and interventions, ordering and review of laboratory studies, ordering and review of radiographic studies, re-evaluation of patient's condition and monitoring for potential decompensation.  Critical care time was exclusive of separately billable procedures and treating other patients.    Chief Complaint     Chief Complaint   Patient presents with    Abdominal Pain    Atrial Fibrillation     HPI     Susan Fair is a 89 year old female who presents for evaluation of chest pain.     The patient reports when she awoke this morning she felt fine, but for 1-2 hours she was having centralized chest pain. Her chest pain is now gone. Patient noted that she was coughing up phlegm this morning, and that after coughing she felt better. She took medications this morning, but was unable to identify which one's she took. She said her daughter should be around soon with more information.     Per chart review, patient presented to [facility] ED on 9/30/2024 for fall. Labs remarkable for WBC 15.9, urinalysis unremarkable. Imaging CT abdomen showed new colorectal cancer at the hepatic flxure with likely lymph node metastasis. Treated with zofran and normal saline  "bolus. Patient admitted for pain management and colorectal surgery consultation. Hospital course was notable for CT scan showed 4.2 cm left adnexal mass. Colonoscopy performed and revealed a 5 cm fungating ulcerated partially obstructing large mass at the hepatic flexure. Patient discharged on 10/2/2024 with prescription for prednisone 5 mg daily with plans to taper to 2.5 mg, and given follow up for colorectal surgery.        I, Sangita Ryan am serving as a scribe to document services personally performed by Dr. Wyatt Trimble MD, based on my observation and the provider's statements to me. I, Dr. Wyatt Trimble MD attest that Sangita Ryan  is acting in a scribe capacity, has observed my performance of the services and has documented them in accordance with my direction.    Physical Exam     BP (!) 151/72   Pulse 119   Temp 97.7  F (36.5  C) (Oral)   Resp 22   Ht 1.448 m (4' 9\")   Wt 81.2 kg (179 lb)   SpO2 95%   BMI 38.74 kg/m    Constitutional: Awake, alert, in no acute distress.  Head: Normocephalic, atraumatic.  ENT: Mucous membranes moist.  Eyes: Conjunctiva normal.  Respiratory: Respirations even, unlabored, in no acute respiratory distress.  Lungs clear to auscultation bilaterally  Cardiovascular: A-fib, rate 120s. Good peripheral perfusion.  GI: Abdomen soft, non-tender.  Musculoskeletal: Moves all 4 extremities equally.  Integument: Warm, dry.  Neurologic: Alert & oriented x 3. Normal speech. Grossly normal motor and sensory function. No focal deficits noted.  Psychiatric: Normal mood    Labs & Imaging     Imaging reviewed and independently interpreted as below;     Results for orders placed or performed during the hospital encounter of 10/03/24   Chest XR,  PA & LAT    Impression    IMPRESSION:     Lung volumes are low. Streaky opacities at the right greater than left lung bases are favored to reflect atelectasis. Small bilateral pleural effusions. Questionable minimal interstitial " edema. No pneumothorax.    Normal cardiac size. Aortic atherosclerotic calcifications.   Extra Blue Top Tube   Result Value Ref Range    Hold Specimen JIC    Extra Red Top Tube   Result Value Ref Range    Hold Specimen JIC    Extra Green Top (Lithium Heparin) Tube   Result Value Ref Range    Hold Specimen JIC    Extra Purple Top Tube   Result Value Ref Range    Hold Specimen JIC    Result Value Ref Range    Troponin T, High Sensitivity 19 (H) <=14 ng/L   Result Value Ref Range    Troponin T, High Sensitivity 18 (H) <=14 ng/L   Basic metabolic panel   Result Value Ref Range    Sodium 132 (L) 135 - 145 mmol/L    Potassium 3.7 3.4 - 5.3 mmol/L    Chloride 97 (L) 98 - 107 mmol/L    Carbon Dioxide (CO2) 25 22 - 29 mmol/L    Anion Gap 10 7 - 15 mmol/L    Urea Nitrogen 12.8 8.0 - 23.0 mg/dL    Creatinine 0.61 0.51 - 0.95 mg/dL    GFR Estimate 82 >60 mL/min/1.73m2    Calcium 8.3 (L) 8.8 - 10.4 mg/dL    Glucose 115 (H) 70 - 99 mg/dL   CBC with platelets and differential   Result Value Ref Range    WBC Count 9.1 4.0 - 11.0 10e3/uL    RBC Count 3.05 (L) 3.80 - 5.20 10e6/uL    Hemoglobin 8.7 (L) 11.7 - 15.7 g/dL    Hematocrit 27.8 (L) 35.0 - 47.0 %    MCV 91 78 - 100 fL    MCH 28.5 26.5 - 33.0 pg    MCHC 31.3 (L) 31.5 - 36.5 g/dL    RDW 13.6 10.0 - 15.0 %    Platelet Count 286 150 - 450 10e3/uL    % Neutrophils 82 %    % Lymphocytes 13 %    % Monocytes 4 %    % Eosinophils 0 %    % Basophils 0 %    % Immature Granulocytes 0 %    NRBCs per 100 WBC 0 <1 /100    Absolute Neutrophils 7.5 1.6 - 8.3 10e3/uL    Absolute Lymphocytes 1.2 0.8 - 5.3 10e3/uL    Absolute Monocytes 0.4 0.0 - 1.3 10e3/uL    Absolute Eosinophils 0.0 0.0 - 0.7 10e3/uL    Absolute Basophils 0.0 0.0 - 0.2 10e3/uL    Absolute Immature Granulocytes 0.0 <=0.4 10e3/uL    Absolute NRBCs 0.0 10e3/uL       EKG     EKG reviewed and independently interpreted as below;  Atrial fibrillation, rate 109, RVR.  QRS 80.  QTc 485.  No STEMI.    Procedures          Atilio,  MD Wyatt  10/03/24 5897

## 2024-10-03 NOTE — CONSULTS
[facility]   Consultation      Place of Service: [facility]  Reason for Consultation: Discuss colonoscopy pathology   Consult Requested by: Dr. Umair Jung    History of Present Illness: Susan Fair is a 89 y/o female with history of history of hypertension, sleep apnea, hypercholesterolemia and prior CVA on Plavix who was recently admitted from [facility] from 9/30/24 - 10/2/24 after a fall. She also noted recent abdominal pain, nausea, and melena prompting a CT scan. CT showed mass like wall thickening at the hepatic flexure with some suspicious lymph nodes. There was no evidence of obstruction on imaging or clinically. GI did a colonoscopy on 10/1/24 which was notable for a likely malignant, large, partially obstructing mass at the hepatic flexure. This was biopsied. She was discharged home on 10/2. She was initially scheduled for clinic follow up with Dr. Gomez on 10/4, but wished to push this back to 10/10 to wait for her sister to get to town to be present at the visit. .0. Needs CT chest to complete staging, does have this scheduled outpatient currently.    She returned to the ED after having chest pain this. In the ED, EKG showed Atrial fibrillation with RVR to 130 bpm. Hgb was noted to be 8.7 compared to 10.1 on 9/30. Colonoscopy pathology resulted yesterday and CRS was consulted to discuss the pathology with the patient.     Today, patient sitting in chair. Daughter, Meera, in room. She did not sleep well last night. Mild upper abdominal pain. Passing flatus and stools. Denies nausea.      Pertinent Labs:   Lab Results: personally reviewed   Lab Results   Component Value Date     10/03/2024     09/30/2024    CO2 25 10/03/2024    CO2 26 09/30/2024    BUN 12.8 10/03/2024    BUN 11.5 09/30/2024     Lab Results   Component Value Date    WBC 9.1 10/03/2024    WBC 15.9 09/30/2024    HGB 8.7 10/03/2024    HGB 10.1 09/30/2024    HCT 27.8 10/03/2024     HCT 32.0 09/30/2024    MCV 91 10/03/2024    MCV 92 09/30/2024     10/03/2024     09/30/2024     Pertinent Radiology: none    Past Medical History:   Diagnosis Date    Cerebral infarction (H)     Sleep apnea        Past Surgical History:   Procedure Laterality Date    COLONOSCOPY      COLONOSCOPY N/A 10/1/2024    Procedure: COLONOSCOPY;  Surgeon: Alejandro Aguiar MD;  [place] [facility] [place]THOPEDIC SURGERY         History reviewed. No pertinent family history.    Social History     Socioeconomic History    Marital status:      Spouse name: Not on file    Number of children: Not on file    Years of education: Not on file    Highest education level: Not on file   Occupational History    Not on file   Tobacco Use    Smoking status: Never    Smokeless tobacco: Never   Substance and Sexual Activity    Alcohol use: Not Currently    Drug use: Never    Sexual activity: Not on file   Other Topics Concern    Not on file   Social History Narrative    Not on file     Social Determinants of Health     Financial Resource Strain: Low Risk  (10/3/2024)    Financial Resource Strain     Within the past 12 months, have you or your family members you live with been unable to get utilities (heat, electricity) when it was really needed?: No   Food Insecurity: Low Risk  (10/3/2024)    Food Insecurity     Within the past 12 months, did you worry that your food would run out before you got money to buy more?: No     Within the past 12 months, did the food you bought just not last and you didn t have money to get more?: No   Transportation Needs: Low Risk  (10/3/2024)    Transportation Needs     Within the past 12 months, has lack of transportation kept you from medical appointments, getting your medicines, non-medical meetings or appointments, work, or from getting things that you need?: No   Physical Activity: Not on file   Stress: Not on file   Social Connections: Not on file   Interpersonal Safety: Not  on file   Housing Stability: Low Risk  (10/3/2024)    Housing Stability     Do you have housing? : Yes     Are you worried about losing your housing?: No   Recent Concern: Housing Stability - High Risk (9/30/2024)    Housing Stability     Do you have housing? : No     Are you worried about losing your housing?: No       Current Facility-Administered Medications   Medication Dose Route Frequency Provider Last Rate Last Admin    acetaminophen (TYLENOL) tablet 650 mg  650 mg Oral TID Domitila Tierney NP        acetaminophen (TYLENOL) tablet 650 mg  650 mg Oral Q8H PRN Domitila Tierney NP        [Held by provider] amLODIPine (NORVASC) tablet 10 mg  10 mg Oral Daily Domitila Tierney NP        calcium carbonate (TUMS) chewable tablet 1,000 mg  1,000 mg Oral 4x Daily PRN Domitila Tierney NP        [START ON 10/4/2024] calcium carbonate-vitamin D (OSCAL) 500-5 MG-MCG per tablet 1 tablet  1 tablet Oral Daily Domitila Tierney NP        clopidogrel (PLAVIX) tablet 75 mg  75 mg Oral Daily Domitila Tierney NP        docusate sodium (COLACE) capsule 100 mg  100 mg Oral Daily Domitila Tierney NP        famotidine (PEPCID) tablet 20 mg  20 mg Oral At Bedtime Domitila Tierney NP        ferrous sulfate (FEROSUL) tablet 325 mg  325 mg Oral Daily Domitila Tierney NP        lidocaine (LMX4) cream   Topical Q1H PRN Domitila Tierney NP        lidocaine 1 % 0.1-1 mL  0.1-1 mL Other Q1H PRN Domitila Tierney NP        [START ON 10/4/2024] losartan-hydrochlorothiazide (HYZAAR) 100-25 MG per tablet 1 tablet  1 tablet Oral Daily Umair Jung MD        metoprolol (LOPRESSOR) injection 5 mg  5 mg Intravenous Q6H PRN Domitila Tierney NP        metoprolol tartrate (LOPRESSOR) tablet 25 mg  25 mg Oral BID Domitila Tierney NP   25 mg at 10/03/24 1538    naloxone (NARCAN) injection 0.2 mg  0.2 mg Intravenous Q2 Min PRN Domitila Tierney NP        Or    naloxone (NARCAN) injection 0.4 mg  0.4 mg Intravenous Q2 Min PRN Domitila Tierney NP        Or    naloxone (NARCAN) injection  "0.2 mg  0.2 mg Intramuscular Q2 Min PRN Domitila Tierney NP        Or    naloxone (NARCAN) injection 0.4 mg  0.4 mg Intramuscular Q2 Min PRN Domitila Tierney NP        ondansetron (ZOFRAN ODT) ODT tab 4 mg  4 mg Oral Q12H PRN Domitila Tierney NP        oxyCODONE IR (ROXICODONE) half-tab 2.5 mg  2.5 mg Oral Q4H PRN Domitila Tierney NP        [START ON 10/4/2024] predniSONE (DELTASONE) tablet 5 mg  5 mg Oral Daily Domitila Tierney NP        senna-docusate (SENOKOT-S/PERICOLACE) 8.6-50 MG per tablet 1 tablet  1 tablet Oral BID PRN Domitila Tierney NP        Or    senna-docusate (SENOKOT-S/PERICOLACE) 8.6-50 MG per tablet 2 tablet  2 tablet Oral BID PRN Domitila Tierney NP        sodium chloride (PF) 0.9% PF flush 3 mL  3 mL Intracatheter Q8H Domitila Tierney NP   3 mL at 10/03/24 1538    sodium chloride (PF) 0.9% PF flush 3 mL  3 mL Intracatheter q1 min prn Domitila Tierney NP           Allergies   Allergen Reactions    Ace Inhibitors Cough       Review of Systems:   \"A full 10 point review of systems was taken and is negative aside from what is noted above in the HPI.\"      Intake/Output past 24 hrs:  No intake or output data in the 24 hours ending 10/03/24 1635    Physical Exam:  Temp:  [97.7  F (36.5  C)] 97.7  F (36.5  C)  Pulse:  [] 96  Resp:  [16-22] 22  BP: (119-167)/(58-82) 132/61  SpO2:  [92 %-96 %] 93 %    General: NAD, alert, cooperative  Head: normocephalic, without abnormality / atraumatic  Respiratory: non-labored breathing  Abdomen: soft, non-tender, non-distended.  No guarding or rebound.  Skin: no rashes or lesions  Musculoskeletal: moves all four extremities equally; no calf edema or tenderness  Psychological: alert and oriented, answers questions appropriately  Neurological: cranial nerves grossly intact    Assessment/Plan: Susan Fair is a female with history of recently diagnosed colon cancer who presented to the ED in atrial fibrillation with RVR. CRS was asked to see the patient to discuss pathology and next " steps for her cancer. Discussed pathology findings with patient and her daughter - will complete staging, but may be a candidate for immunotherapy based on MSI high. Can go over this in more detail and ask further questions at her planned follow-up with Dr. Gomez on 10/10 when her other daughter is in town as well.     1. Will place outpatient [facility] consult  2. Can complete staging while inapatient with CT chest.   3. OK for diet from CRS perspective  4. CRS will sign off. Patient is scheduled for clinic follow up with Dr. Gomez on 10/10/24 at 8:30 am    Medical Decision Making:   Additional workup / testing ordered: CT chest  Procedure / Surgery recommended: no surgical intervention indicated   Old records reviewed - prior admission notes, ED notes  Medications added / changed: none    This case was discussed with: Dr. Deanne Gomez    Thank you for consulting [facility] regarding this patient's care. Please contact us with questions or concerns.     Today's total time spent including direct patient care and management, and care coordination: 65 minutes    Mariza Hendrix PA-C  [facility]  [phone]     COLORECTAL STAFF ADDENDUM  Patient seen and examined by me. Agree with above with following comments/additions:    89 year old female with hx of HTN, hyperlipidemia, CVA on plavix with recently diagnosed right sided colon cancer admitted with chest pain, shortness of breath and afib RVR. Currently doing ok but still some generalized chest discomfort. ECHO was just completed. No obvious metastatic disease on her staging workup but . She lives in an adult assisted living where she does most of her ADLs but has help with cleaning and some meals.     Appears comfortable  Abd soft, non distended, non tender    A/P 89 year old female with newly diagnosed right sided colon cancer, MSI High, readmitted with afib and RVR    I discussed with Susan and her daughter the  options for moving forward. We could consider surgical resection given there is no obvious metastatic disease on imagine, however, with her CEA that high we should probably also get a PET scan as outpatient. We discussed that she would be at increased risk for complications following resection given her age, and we would have to sort out her new afib with cardiology input on risk for her. We also discussed she would be at higher risk for functional decline after having a major abdominal surgery where she may have some cognitive decline or not be as independent afterwards. We discussed the other option of doing nothing. Over time, likely this tumor would continue to grow and become more symptomatic over the next several months. This choice would be essentially for hospice. We also discussed the possibility of immunotherapy given this is an MSI High tumor. We discussed if she had a good response then could possibly just then closely monitor her and not have surgery, but if she didn't respond then would have to think about surgery if she still wanted to pursue that and not hospice. We will have oncology see her to weigh in on that as an option and discuss the specific risks with that.  Either way, I counseled Susan and her daughter to sit down with their family and discuss what are Susan's goals moving forward and what they would like to achieve with those options long term for her. She has an appt to see me in clinic next week and we will touch base at that time and will also likely have more information in regards to her heart status, PET scan and oncology input. CRS will not actively follow at this time, but please call with any questions or concerns.     Patient specific risk factors: on blood thinner with plavix, BMI 36    Total time spent on consultation including chart review, face to face time and coordination of care: 35 minutes      Deanne Gomez MD  [facility]  Office:  [phone]  10/4/2024 1:38 PM

## 2024-10-03 NOTE — ED TRIAGE NOTES
Pt arrives to the ED from independent living facility via [facility] for epigastric pain after eating at 0830. Pt was given 324 mg of aspirin by Fire and pain was resolved by the time EMS arrived. Pt found to be in Afib which she has a history of. Denies taking blood thinners. Denies any pain at this time. Endorses a cough for the last couple of days. Alert and oriented.     Pt notes recently getting discharged from the hospital for a similar abdominal pain. Has a hx of cancer somewhere in her abdomen but unsure of exactly where.      Triage Assessment (Adult)       Row Name 10/03/24 1112          Triage Assessment    Airway WDL WDL        Respiratory WDL    Respiratory WDL WDL        Cardiac WDL    Cardiac WDL X;rhythm     Pulse Rate & Regularity tachycardic     Cardiac Rhythm Atrial fibrillation        Peripheral/Neurovascular WDL    Peripheral Neurovascular WDL WDL        Cognitive/Neuro/Behavioral WDL    Cognitive/Neuro/Behavioral WDL WDL

## 2024-10-03 NOTE — PLAN OF CARE
Problem: Adult Inpatient Plan of Care  Goal: Plan of Care Review  Description: The Plan of Care Review/Shift note should be completed every shift.  The Outcome Evaluation is a brief statement about your assessment that the patient is improving, declining, or no change.  This information will be displayed automatically on your shift  note.  Outcome: Progressing   Goal Outcome Evaluation:       Admitted from ER, was able to walk into room with walker, denies pain , ambulated to bathroom and voided without problems, rhythm is Afib rate 80-110s

## 2024-10-03 NOTE — PROGRESS NOTES
I checked in on patient and patient states she uses CPAP at home. She currently does not have her unit with her but family states they will bring in if needed/if patient stays longer than one night. RT will follow.    Virgil Noel, RT  10/4/24

## 2024-10-04 ENCOUNTER — APPOINTMENT (OUTPATIENT)
Dept: CT IMAGING | Facility: HOSPITAL | Age: 89
DRG: 309 | End: 2024-10-04
Payer: COMMERCIAL

## 2024-10-04 ENCOUNTER — APPOINTMENT (OUTPATIENT)
Dept: CARDIOLOGY | Facility: HOSPITAL | Age: 89
DRG: 309 | End: 2024-10-04
Payer: COMMERCIAL

## 2024-10-04 ENCOUNTER — APPOINTMENT (OUTPATIENT)
Dept: ULTRASOUND IMAGING | Facility: HOSPITAL | Age: 89
DRG: 309 | End: 2024-10-04
Attending: INTERNAL MEDICINE
Payer: COMMERCIAL

## 2024-10-04 LAB
ANION GAP SERPL CALCULATED.3IONS-SCNC: 8 MMOL/L (ref 7–15)
ATRIAL RATE - MUSE: 77 BPM
BUN SERPL-MCNC: 11.2 MG/DL (ref 8–23)
CALCIUM SERPL-MCNC: 8.3 MG/DL (ref 8.8–10.4)
CHLORIDE SERPL-SCNC: 102 MMOL/L (ref 98–107)
CREAT SERPL-MCNC: 0.62 MG/DL (ref 0.51–0.95)
DIASTOLIC BLOOD PRESSURE - MUSE: 71 MMHG
EGFRCR SERPLBLD CKD-EPI 2021: 82 ML/MIN/1.73M2
ERYTHROCYTE [DISTWIDTH] IN BLOOD BY AUTOMATED COUNT: 13.8 % (ref 10–15)
GLUCOSE SERPL-MCNC: 101 MG/DL (ref 70–99)
HCO3 SERPL-SCNC: 28 MMOL/L (ref 22–29)
HCT VFR BLD AUTO: 28.3 % (ref 35–47)
HGB BLD-MCNC: 9 G/DL (ref 11.7–15.7)
INTERPRETATION ECG - MUSE: NORMAL
LVEF ECHO: NORMAL
MCH RBC QN AUTO: 28.8 PG (ref 26.5–33)
MCHC RBC AUTO-ENTMCNC: 31.8 G/DL (ref 31.5–36.5)
MCV RBC AUTO: 91 FL (ref 78–100)
P AXIS - MUSE: NORMAL DEGREES
PLATELET # BLD AUTO: 334 10E3/UL (ref 150–450)
POTASSIUM SERPL-SCNC: 3.8 MMOL/L (ref 3.4–5.3)
PR INTERVAL - MUSE: NORMAL MS
QRS DURATION - MUSE: 80 MS
QT - MUSE: 286 MS
QTC - MUSE: 385 MS
R AXIS - MUSE: 20 DEGREES
RBC # BLD AUTO: 3.12 10E6/UL (ref 3.8–5.2)
SODIUM SERPL-SCNC: 138 MMOL/L (ref 135–145)
SYSTOLIC BLOOD PRESSURE - MUSE: 126 MMHG
T AXIS - MUSE: 5 DEGREES
VENTRICULAR RATE- MUSE: 109 BPM
WBC # BLD AUTO: 8 10E3/UL (ref 4–11)

## 2024-10-04 PROCEDURE — 250N000013 HC RX MED GY IP 250 OP 250 PS 637

## 2024-10-04 PROCEDURE — 250N000011 HC RX IP 250 OP 636: Performed by: INTERNAL MEDICINE

## 2024-10-04 PROCEDURE — 99222 1ST HOSP IP/OBS MODERATE 55: CPT | Mod: FS | Performed by: STUDENT IN AN ORGANIZED HEALTH CARE EDUCATION/TRAINING PROGRAM

## 2024-10-04 PROCEDURE — 36415 COLL VENOUS BLD VENIPUNCTURE: CPT

## 2024-10-04 PROCEDURE — 85027 COMPLETE CBC AUTOMATED: CPT

## 2024-10-04 PROCEDURE — G0452 MOLECULAR PATHOLOGY INTERPR: HCPCS | Mod: 26 | Performed by: STUDENT IN AN ORGANIZED HEALTH CARE EDUCATION/TRAINING PROGRAM

## 2024-10-04 PROCEDURE — 93306 TTE W/DOPPLER COMPLETE: CPT

## 2024-10-04 PROCEDURE — 120N000004 HC R&B MS OVERFLOW

## 2024-10-04 PROCEDURE — 76856 US EXAM PELVIC COMPLETE: CPT

## 2024-10-04 PROCEDURE — 99223 1ST HOSP IP/OBS HIGH 75: CPT | Performed by: INTERNAL MEDICINE

## 2024-10-04 PROCEDURE — 99207 PR APP CREDIT; MD BILLING SHARED VISIT: CPT

## 2024-10-04 PROCEDURE — 80048 BASIC METABOLIC PNL TOTAL CA: CPT

## 2024-10-04 PROCEDURE — 93306 TTE W/DOPPLER COMPLETE: CPT | Mod: 26 | Performed by: INTERNAL MEDICINE

## 2024-10-04 PROCEDURE — 81288 MLH1 GENE: CPT | Performed by: INTERNAL MEDICINE

## 2024-10-04 PROCEDURE — 250N000012 HC RX MED GY IP 250 OP 636 PS 637

## 2024-10-04 PROCEDURE — 250N000009 HC RX 250

## 2024-10-04 PROCEDURE — 99232 SBSQ HOSP IP/OBS MODERATE 35: CPT | Performed by: INTERNAL MEDICINE

## 2024-10-04 PROCEDURE — G0378 HOSPITAL OBSERVATION PER HR: HCPCS

## 2024-10-04 PROCEDURE — 96375 TX/PRO/DX INJ NEW DRUG ADDON: CPT

## 2024-10-04 PROCEDURE — 258N000003 HC RX IP 258 OP 636: Performed by: INTERNAL MEDICINE

## 2024-10-04 PROCEDURE — 250N000013 HC RX MED GY IP 250 OP 250 PS 637: Performed by: INTERNAL MEDICINE

## 2024-10-04 PROCEDURE — 71250 CT THORAX DX C-: CPT

## 2024-10-04 RX ORDER — METOPROLOL TARTRATE 25 MG/1
50 TABLET, FILM COATED ORAL 2 TIMES DAILY
Status: DISCONTINUED | OUTPATIENT
Start: 2024-10-04 | End: 2024-10-05

## 2024-10-04 RX ADMIN — METOPROLOL TARTRATE 25 MG: 25 TABLET, FILM COATED ORAL at 08:16

## 2024-10-04 RX ADMIN — DOCUSATE SODIUM 100 MG: 100 CAPSULE, LIQUID FILLED ORAL at 08:16

## 2024-10-04 RX ADMIN — OXYCODONE HYDROCHLORIDE 2.5 MG: 5 TABLET ORAL at 08:24

## 2024-10-04 RX ADMIN — IRON SUCROSE 200 MG: 20 INJECTION, SOLUTION INTRAVENOUS at 11:45

## 2024-10-04 RX ADMIN — METOPROLOL TARTRATE 50 MG: 25 TABLET, FILM COATED ORAL at 20:37

## 2024-10-04 RX ADMIN — PREDNISONE 5 MG: 5 TABLET ORAL at 08:15

## 2024-10-04 RX ADMIN — ACETAMINOPHEN 650 MG: 325 TABLET ORAL at 08:24

## 2024-10-04 RX ADMIN — CLOPIDOGREL BISULFATE 75 MG: 75 TABLET ORAL at 08:16

## 2024-10-04 RX ADMIN — Medication 325 MG: at 08:15

## 2024-10-04 RX ADMIN — METOPROLOL TARTRATE 5 MG: 5 INJECTION INTRAVENOUS at 15:52

## 2024-10-04 RX ADMIN — ACETAMINOPHEN 650 MG: 325 TABLET ORAL at 20:37

## 2024-10-04 RX ADMIN — FAMOTIDINE 20 MG: 20 TABLET ORAL at 20:36

## 2024-10-04 RX ADMIN — ACETAMINOPHEN 650 MG: 325 TABLET ORAL at 15:42

## 2024-10-04 RX ADMIN — LOSARTAN POTASSIUM AND HYDROCHLOROTHIAZIDE 1 TABLET: 25; 100 TABLET ORAL at 08:15

## 2024-10-04 ASSESSMENT — ACTIVITIES OF DAILY LIVING (ADL)
ADLS_ACUITY_SCORE: 40
ADLS_ACUITY_SCORE: 31
ADLS_ACUITY_SCORE: 39
ADLS_ACUITY_SCORE: 39
ADLS_ACUITY_SCORE: 31
ADLS_ACUITY_SCORE: 33
ADLS_ACUITY_SCORE: 31
ADLS_ACUITY_SCORE: 31
ADLS_ACUITY_SCORE: 40
ADLS_ACUITY_SCORE: 31
ADLS_ACUITY_SCORE: 39
ADLS_ACUITY_SCORE: 31
ADLS_ACUITY_SCORE: 39
ADLS_ACUITY_SCORE: 31
ADLS_ACUITY_SCORE: 31

## 2024-10-04 NOTE — CONSULTS
[facility] Hematology and Oncology Inpatient Consult Note    Patient: Susan Fair  MRN: [id number]  Date of Service: 10/4/2024      Reason for Visit    Locally advanced colon cancer which appears to be microsatellite unstable  Advanced age    Assessment and plan    Colon carcinoma of the ascending colon.  Diagnosed when patient came in with abdominal discomfort.  So far not causing any complete obstruction but definitely causing symptoms.  Biopsy is confirmatory of malignancy.  Also showing MSH and PMH deficiency suggesting microsatellite instability but not has mismatch repair gene deficiency.  She recently developed atrial fibrillation.  Therefore surgical team to come and see her regarding option for immunotherapy.  These patients can respond to immunotherapy.  We had a long discussion about it.  I think it is reasonable condition to try.  It can definitely serve as a bridge between now and surgery.  Although immunotherapy is not completely devoid of side effects.  We discussed that in detail as well.  Patient is going to think about it and let me know.  The patient can contact my office at [phone] if they want to discuss immunotherapy treatment.  Atrial fibrillation.  Treat as per cardiology and hospitalist.  Elderly age.  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  Needs supportive care.    MEDICAL DECISION MAKING:    Patient presenting with rather advanced malignancy and multiple medical problems.  Life-threatening issue.  Also having significant effect on morbidity.Reviewed notes from each unique source.  Reviewed each unique test.    Ordered tests.    Independently interpreted lab tests and radiological exams performed by other physicians.  Personally reviewed the images of the CT scan.  Independent historian account obtained as noted.    Staging History    Cancer Staging   No matching staging information was found for the patient.        History  Ms. Susan Fair is a 89 year old who has recently been diagnosed  with colon cancer located in the hepatic flexure measuring about 6 cm in size.  Presenting after she came to the hospital in the end of September 2024 and had some abdominal pain.  CT scan was done which showed this mass.  She also had some anemia.  Underwent colonoscopy and the biopsy confirmed that this is colon cancer.  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    While the patient was being evaluated for surgical treatment she developed atrial fibrillation.  She is also 89 years old.  Has multiple medical issues and comorbidities.  Surgical team is getting worried that she may not be able to handle surgery very well.  Therefore advised her to be seen by us to discuss immunotherapy treatment.    The patient was seen in her room.  She was accompanied by her daughter and grandson who helped to provide some medical history.    Review of systems.    A 14 point review of systems was obtained.  Positive findings noted in the history.  Rest of the review of system is otherwise negative.      Past History  Past Medical History:   Diagnosis Date    Cerebral infarction (H)     Sleep apnea      Past Surgical History:   Procedure Laterality Date    COLONOSCOPY      COLONOSCOPY N/A 10/1/2024    Procedure: COLONOSCOPY;  Surgeon: Alejandro Aguiar MD;  Location[facility] OR    ORTHOPEDIC SURGERY       History reviewed. No pertinent family history.  Social History     Socioeconomic History    Marital status:      Spouse name: None    Number of children: None    Years of education: None    Highest education level: None   Tobacco Use    Smoking status: Never    Smokeless tobacco: Never   Substance and Sexual Activity    Alcohol use: Not Currently    Drug use: Never     Social Determinants of Health     Financial Resource Strain: Low Risk  (10/3/2024)    Financial Resource Strain     Within the  past 12 months, have you or your family members you live with been unable to get utilities (heat, electricity) when it was really needed?: No   Food Insecurity: Low Risk  (10/3/2024)    Food Insecurity     Within the past 12 months, did you worry that your food would run out before you got money to buy more?: No     Within the past 12 months, did the food you bought just not last and you didn t have money to get more?: No   Transportation Needs: Low Risk  (10/3/2024)    Transportation Needs     Within the past 12 months, has lack of transportation kept you from medical appointments, getting your medicines, non-medical meetings or appointments, work, or from getting things that you need?: No   Housing Stability: Low Risk  (10/3/2024)    Housing Stability     Do you have housing? : Yes     Are you worried about losing your housing?: No   Recent Concern: Housing Stability - High Risk (9/30/2024)    Housing Stability     Do you have housing? : No     Are you worried about losing your housing?: No       Allergies    Allergies   Allergen Reactions    Ace Inhibitors Cough          Physical Exam    /84 (BP Location: Right arm)   Pulse 104   Temp 98.7  F (37.1  C) (Oral)   Resp 22   Ht 1.524 m (5')   Wt 84.4 kg (186 lb 1.1 oz)   SpO2 93%   BMI 36.34 kg/m      GENERAL: Elderly woman.  Laying in bed.  No acute distress. Cooperative in conversation.   HEENT: pupils are equal, round and reactive. Oral mucosa is moist and intact.  RESP:Chest symmetric. Regular respiratory rate. No stridor.  ABD: Nondistended, soft.  EXTREMITIES: No lower extremity edema.   NEURO: non focal. Alert and oriented x3.   PSYCH: within normal limits. No depression or anxiety.  SKIN: warm dry intact       Lab Results  Recent Results (from the past 24 hour(s))   Basic metabolic panel    Collection Time: 10/04/24  4:40 AM   Result Value Ref Range    Sodium 138 135 - 145 mmol/L    Potassium 3.8 3.4 - 5.3 mmol/L    Chloride 102 98 - 107 mmol/L     Carbon Dioxide (CO2) 28 22 - 29 mmol/L    Anion Gap 8 7 - 15 mmol/L    Urea Nitrogen 11.2 8.0 - 23.0 mg/dL    Creatinine 0.62 0.51 - 0.95 mg/dL    GFR Estimate 82 >60 mL/min/1.73m2    Calcium 8.3 (L) 8.8 - 10.4 mg/dL    Glucose 101 (H) 70 - 99 mg/dL   CBC with platelets    Collection Time: 10/04/24  4:40 AM   Result Value Ref Range    WBC Count 8.0 4.0 - 11.0 10e3/uL    RBC Count 3.12 (L) 3.80 - 5.20 10e6/uL    Hemoglobin 9.0 (L) 11.7 - 15.7 g/dL    Hematocrit 28.3 (L) 35.0 - 47.0 %    MCV 91 78 - 100 fL    MCH 28.8 26.5 - 33.0 pg    MCHC 31.8 31.5 - 36.5 g/dL    RDW 13.8 10.0 - 15.0 %    Platelet Count 334 150 - 450 10e3/uL   Echocardiogram Complete    Collection Time: 10/04/24 10:15 AM   Result Value Ref Range    LVEF  60-65%         Imaging Results    Echocardiogram Complete    Result Date: 10/4/2024  [id number]^TIMMY^[place] [address]  Name: ANNITA HENLEY MRN: [id number] : 10/11/1928 Study Date: 10/04/2024 09:52 AM Age: 89 yrs Gender: Female Patient Location: [facility] Reason For Study: Atrial Fibrillation Ordering Physician: NELSON WARNER Performed By: BP  BSA: 1.8 m2 Height: 60 in Weight: 186 lb HR: 107 BP: 189/73 mmHg ______________________________________________________________________________ Procedure Complete Portable Echo Adult. ______________________________________________________________________________ Interpretation Summary  The visual ejection fraction is 60-65%. No regional wall motion abnormalities noted. Mildly decreased right ventricular systolic function Mild left atrial enlargement There is mild to moderate (1-2+) mitral regurgitation. There is mild to moderate (1-2+) aortic regurgitation. There is mild to moderate (1-2+) tricuspid regurgitation. The right ventricular systolic pressure is approximated at 55 mmHg. ______________________________________________________________________________ Left Ventricle The left  ventricle is normal in size. There is normal left ventricular wall thickness. The visual ejection fraction is 60-65%. Diastolic function not assessed due to atrial fibrillation. No regional wall motion abnormalities noted.  Right Ventricle The right ventricle is normal size. TAPSE is abnormal, which is consistent with abnormal right ventricular systolic function. Mildly decreased right ventricular systolic function.  Atria The left atrium is mildly dilated. Borderline right atrial enlargement.  Mitral Valve Mitral valve leaflets appear normal. There is mild to moderate (1-2+) mitral regurgitation.  Tricuspid Valve Tricuspid valve leaflets appear normal. There is mild to moderate (1-2+) tricuspid regurgitation. The right ventricular systolic pressure is approximated at 40mmHg plus the right atrial pressure.  Aortic Valve The aortic valve is trileaflet with aortic valve sclerosis. There is mild to moderate (1-2+) aortic regurgitation.  Pulmonic Valve The pulmonic valve is not well seen, but is grossly normal. There is mild to moderate (1-2+) pulmonic valvular regurgitation. Right ventricular diastolic pressure is approximated at 8mmHg plus the right atrial pressure.  Vessels The aorta root is normal. IVC diameter >2.1 cm collapsing <50% with sniff suggests a high RA pressure estimated at 15 mmHg or greater.  Pericardium There is no pericardial effusion.  ______________________________________________________________________________ MMode/2D Measurements & Calculations IVSd: 1.1 cm LVIDd: 3.9 cm LVIDs: 2.3 cm LVPWd: 0.93 cm FS: 41.0 %  LV mass(C)d: 123.1 grams LV mass(C)dI: 68.0 grams/m2 Ao root diam: 3.3 cm LA dimension: 3.8 cm LA/Ao: 1.2 LVOT diam: 1.9 cm LVOT area: 2.8 cm2 Ao root diam index Ht(cm/m): 2.2 Ao root diam index BSA (cm/m2): 1.8 EF Biplane: 62.0 % LA Volume (BP): 60.9 ml LA Volume Index (BP): 33.6 ml/m2  LA Volume Indexed (AL/bp): 36.2 ml/m2 RV Base: 3.2 cm RWT: 0.48 TAPSE: 1.3 cm  Time Measurements MM  HR: 107.0 BPM  Doppler Measurements & Calculations MV E max luis: 122.0 cm/sec MV dec time: 0.22 sec  Ao V2 max: 159.0 cm/sec Ao max PG: 10.0 mmHg Ao V2 mean: 109.0 cm/sec Ao mean P.0 mmHg Ao V2 VTI: 32.0 cm SUNNY(I,D): 1.4 cm2 SUNNY(V,D): 1.4 cm2 AI P1/2t: 433.7 msec LV V1 max P.4 mmHg LV V1 max: 77.4 cm/sec LV V1 VTI: 15.4 cm MR PISA: 1.0 cm2 MR ERO: 0.08 cm2 MR volume: 11.8 ml SV(LVOT): 43.7 ml SI(LVOT): 24.1 ml/m2 PA acc time: 0.08 sec PI end-d luis: 140.5 cm/sec TR max luis: 317.0 cm/sec TR max P.2 mmHg AV Luis Ratio (DI): 0.49 SUNNY Index (cm2/m2): 0.75 E/E': 10.6 E/E' av.4 Lateral E/e': 10.6 Medial E/e': 12.2 Peak E' Luis: 11.5 cm/sec RV S Luis: 13.8 cm/sec  ______________________________________________________________________________ Report approved by: Stuart Braxton 10/04/2024 02:03 PM       CT Chest w/o Contrast    Result Date: 10/4/2024  EXAM: CT CHEST W/O CONTRAST LOCATION: [facility] DATE: 10/4/2024 INDICATION: Colon cancer; staging COMPARISON: Chest x-ray 10/3/2024, CT AP 2024 TECHNIQUE: CT chest without IV contrast. Multiplanar reformats were obtained. Dose reduction techniques were used. CONTRAST: None. FINDINGS: Respiratory motion artifact in the mid lung. LUNGS AND PLEURA: There is a 4 mm nodule laterally in the lingula (axial image 152), two, 4 mm groundglass nodules in the inferior left upper lobe (axial image 104) and tiny linear opacity superior to the (axial image 94). Patient's developed small, bilateral pleural effusions, right greater than left since last week with associated compressive atelectasis. MEDIASTINUM/AXILLAE: There is a 8 mm right thyroid nodule. No adenopathy. Aorta and branches are normal. CORONARY ARTERY CALCIFICATION: Moderate. UPPER ABDOMEN: No liver lesions. Slight atrophy the pancreas. MUSCULOSKELETAL: Exaggeration of the thoracic kyphoscoliosis. No suspicious lesions.     IMPRESSION: 1.  There is a 4 mm subpleural nodule in the  lingula and two, faint groundglass nodules in the inferior left upper lobe. These are presumed to be incidental and can be followed. 2.  No other findings of concern for pulmonary metastasis. 3.  Patient's developed small, bilateral pleural effusions over the last week, right greater than left without other findings of pulmonary edema. 4.  Incidental 8 mm right thyroid nodule which needs no follow-up given age. Reference given below. REFERENCE: Reagan STORY et al. Managing Incidental Thyroid Nodules Detected on Imaging: White Paper of the ACR Incidental Thyroid Findings Committee. JACR 2015; 12:143-150. Incidental thyroid nodule detected on CT or MRI without suspicious findings. Applies to general population without limited life expectancy or significant comorbidities. Age greater than or equal to 35 years Less than 1.5 cm: No further evaluation. Greater than or equal to 1.5 cm: Evaluate with thyroid ultrasound.     Chest XR,  PA & LAT    Result Date: 10/3/2024  EXAM: XR CHEST 2 VIEWS LOCATION: [facility] DATE: 10/3/2024 INDICATION: Cough, chest pain. Fast heart rate. COMPARISON: Chest radiograph 9/5/2011.     IMPRESSION: Lung volumes are low. Streaky opacities at the right greater than left lung bases are favored to reflect atelectasis. Small bilateral pleural effusions. Questionable minimal interstitial edema. No pneumothorax. Normal cardiac size. Aortic atherosclerotic calcifications.    CT Abdomen Pelvis w Contrast    Result Date: 9/30/2024  EXAM: CT ABDOMEN PELVIS W CONTRAST LOCATION: [facility] DATE: 9/30/2024 INDICATION: Periumbilical abdominal pain COMPARISON: None. TECHNIQUE: CT scan of the abdomen and pelvis was performed following injection of IV contrast. Multiplanar reformats were obtained. Dose reduction techniques were used. CONTRAST: IsoVue 370 90mL FINDINGS: LOWER CHEST: Dependent atelectasis. HEPATOBILIARY: Normal liver contours. Subcentimeter  low-attenuation lesion in segment 5 is incompletely characterized though favored to represent a benign cyst/hemangioma. Gallbladder is unremarkable. No biliary ductal dilation. PANCREAS: Normal. SPLEEN: Normal. ADRENAL GLANDS: Normal. KIDNEYS/BLADDER: The kidneys enhance symmetrically. No urolithiasis or hydronephrosis. Trabeculated bladder contours are likely sequela of chronic outflow compromise. BOWEL: There is annular irregular thickening of an approximately 8 cm segment of the ascending colon at the hepatic flexure with adjacent stranding/inflammation. No abscess or organized fluid collection. Remainder of small and large bowel is normal in caliber. Colonic diverticulosis. Normal appendix. LYMPH NODES: 1.4 cm rounded low-attenuation mesenteric lymph node adjacent to the SMV (3/76). VASCULATURE: Abdominal aorta is nonaneurysmal with moderate atheromatous disease. PELVIC ORGANS: Uterus is present. 4.2 x 3.5 x 3.6 cm cystic lesion in the left adnexa. Trace amount of adjacent free fluid. MUSCULOSKELETAL: Degenerative disc disease and facet osteoarthritis throughout the visualized spine. No worrisome bone lesions.     IMPRESSION: 1.  Irregular masslike wall thickening of the hepatic flexure with adjacent inflammatory changes, suspicious for primary colonic neoplasm. No abscess or perforation. GI consultation for consideration of endoscopy is recommended. 2.  Prominent mesenteric lymph node adjacent to the SMV, suspicious for mora disease. 3.  No convincing evidence of solid organ metastasis in the abdomen or pelvis. 4.  4.2 cm left adnexal cystic lesion, which is incompletely characterized. Recommend nonemergent follow-up pelvic ultrasound to further assess (if within patient goals of care). [Urgent Result: Colonic mass] Finding was identified on 9/30/2024 9:59 AM CDT. Dr. Us was contacted by me on 9/30/2024 10:23 AM CDT and verbalized understanding of the result.     Total time spent was over 85 minutes.  This  includes chart prep time, review of clinical data including notes from outside physicians, labs, review of medical imaging, discussion about  plan of care, documentation and .    Signed by: Pranay Peter MD    This note has been dictated using voice recognition software. Any grammatical or context distortions are unintentional and inherent to the software

## 2024-10-04 NOTE — PLAN OF CARE
Goal Outcome Evaluation:       A&O x 4 pleasant and cooperative with cares. Afib HR  denies chest pain. On room air denies shortness of breath. Lung sounds with expiratory wheezes bilaterally. Up in room with assist of one.     Problem: Chest Pain  Goal: Resolution of Chest Pain Symptoms  Outcome: Progressing     Problem: Gas Exchange Impaired  Goal: Optimal Gas Exchange  Outcome: Progressing  Intervention: Optimize Oxygenation and Ventilation  Recent Flowsheet Documentation  Taken 10/3/2024 3231 by Santiago Hyde, RN  Head of Bed (HOB) Positioning: HOB at 20-30 degrees

## 2024-10-04 NOTE — PLAN OF CARE
"  Problem: Adult Inpatient Plan of Care  Goal: Plan of Care Review  Description: The Plan of Care Review/Shift note should be completed every shift.  The Outcome Evaluation is a brief statement about your assessment that the patient is improving, declining, or no change.  This information will be displayed automatically on your shift  note.  Outcome: Progressing  Goal: Patient-Specific Goal (Individualized)  Description: You can add care plan individualizations to a care plan. Examples of Individualization might be:  \"Parent requests to be called daily at 9am for status\", \"I have a hard time hearing out of my right ear\", or \"Do not touch me to wake me up as it startles  me\".  Outcome: Progressing  Goal: Absence of Hospital-Acquired Illness or Injury  Outcome: Progressing  Intervention: Identify and Manage Fall Risk  Recent Flowsheet Documentation  Taken 10/4/2024 1246 by Maricarmen Blanco RN  Safety Promotion/Fall Prevention: activity supervised  Taken 10/4/2024 0826 by Maricarmen Blanco RN  Safety Promotion/Fall Prevention: activity supervised  Intervention: Prevent Infection  Recent Flowsheet Documentation  Taken 10/4/2024 1246 by Maricarmen Blanco RN  Infection Prevention: rest/sleep promoted  Taken 10/4/2024 0826 by Maricarmen Blanco RN  Infection Prevention: rest/sleep promoted  Goal: Optimal Comfort and Wellbeing  Outcome: Progressing  Goal: Readiness for Transition of Care  Outcome: Progressing     Problem: Skin Injury Risk Increased  Goal: Skin Health and Integrity  Outcome: Progressing  Intervention: Plan: Nurse Driven Intervention: Moisture Management  Recent Flowsheet Documentation  Taken 10/4/2024 1246 by Maricarmen Blanco RN  Moisture Interventions: Encourage regular toileting  Taken 10/4/2024 0826 by Maricarmen Blanco RN  Moisture Interventions: Encourage regular toileting  Intervention: Plan: Nurse Driven Intervention: Friction and Shear  Recent Flowsheet Documentation  Taken " 10/4/2024 1246 by Maricarmen Blanco RN  Friction/Shear Interventions: HOB 30 degrees or less  Taken 10/4/2024 0826 by Maricarmen Blanco RN  Friction/Shear Interventions: HOB 30 degrees or less  Intervention: Optimize Skin Protection  Recent Flowsheet Documentation  Taken 10/4/2024 1246 by Maricarmen Blanco RN  Activity Management:   up ad marline   up in chair  Taken 10/4/2024 0826 by Maricarmen Blanco RN  Activity Management:   up ad marline   up in chair     Problem: Chest Pain  Goal: Resolution of Chest Pain Symptoms  Outcome: Progressing     Problem: Gas Exchange Impaired  Goal: Optimal Gas Exchange  Outcome: Progressing     Problem: Risk for Delirium  Goal: Optimal Coping  Outcome: Progressing  Goal: Improved Behavioral Control  Outcome: Progressing  Intervention: Minimize Safety Risk  Recent Flowsheet Documentation  Taken 10/4/2024 1246 by Maricarmen Blanco RN  Enhanced Safety Measures: pain management  Taken 10/4/2024 0826 by Maricarmen Blanco RN  Enhanced Safety Measures: pain management  Goal: Improved Attention and Thought Clarity  Outcome: Progressing  Goal: Improved Sleep  Outcome: Progressing   Goal Outcome Evaluation:       Patient is alert and able to let her needs be known. Reports pain 4/10, PRN tylenol and oxycodone 2.5mg given and was effective for her. AFIB with RVR on the monitor. HR 80-90s at rest and with exertion will go up to 140-160. IV iron was ordered. Hgb 9.0. Hem/oncology consulted for input. Daughter is at bedside and updated. Continue plan of care.

## 2024-10-04 NOTE — CONSULTS
[facility]   [address], [place] [address]   [email or web address]   OFFICE: [phone]     CARDIOLOGY INPATIENT CONSULT NOTE     Impression and Plan     Assessment:  New onset Atrial fibrillation with RVR: Fair rate control on metoprolol although constant mild chest discomfort persists. Chest pain with slight flat trop elevation inconsistent with ACS, moderate coronary calcification on CT chest. [id number] score at least 6 9.7% stroke risk annually (Age, female, HTN, CVA). HASBLED at least 4 (predisposition, age, HTN). We discussed increased risk of stroke with AF as well as bleeding on OAC.  Hypertension: Home losartan-hydrochlorothiazide, amlodipine  Adenocarcinoma of colon: Recent diagnosis after colonoscopy and biopsy 10/1 during alternate admission  Anemia: Hgb 8.7>>9.0, previously 10.1 9/30  CVA vs TIA: Home Plavix    Plan:  Complete echocardiogram  She is a poor candidate for oral anticoagulation with anemia, colon adenocarcinoma, advanced age. Without appropriate anticoagulation will not attempt conversion with amiodarone or cardioversion at this time.  Continue to monitor telemetry for rate and conversion to NSR  With persistent chest discomfort and hypertension opt for further rate control improvement. Increase metoprolol to 50 mg BID.  Will continue to follow    Primary Cardiologist: Not established    History of Present Illness      Ms. Susan Fair is a 89 year old female with PMHx HTN, colon adenocarcinoma. Patient is accompanied by her daughter this morning. Yesterday during home health eval patient developed chest discomfort. Aid noted fast and irregular heart rhythm and she presented to ED. In ED EKG confirmed A fib with  bpm. Trop 19>>18. At one point patient reported improved pain prior to IV metoprolol x3, although this morning reports mid-mod constant chest discomfort 4/10 rating. Rate control has improved this morning 80-90 bpm  on PO metoprolol while in the room. Has never had a fib before. Denies diabetes or PAD history. Her and daughter recall TIA in the past, but denies CVA.       Other than noted above, Ms. Fair denies any shortness of breath at rest or with exertion, light headedness/dizziness, pre-syncope, syncope, lower extremity swelling, palpitations, paroxysmal nocturnal dyspnea (PND), or orthopnea.      Review of Systems:  Further review of systems is otherwise negative/noncontributory (based on review of medical record (admission H&P) and 13 point review of systems reviewed. Pertinent positives noted).    Cardiac Diagnostics     ECG: Personally reviewed and interpreted: atrial fibrillation with RVR    Telemetry (personally reviewed): Atrial fibrillation with RVR avg rate 109 bpm         Medical History  Surgical History Family History Social History   Past Medical History:   Diagnosis Date     Cerebral infarction (H)      Sleep apnea      Past Surgical History:   Procedure Laterality Date     COLONOSCOPY       COLONOSCOPY N/A 10/1/2024    Procedure: COLONOSCOPY;  Surgeon: Alejandro Aguiar MD;  Location: [facility] OR     ORTHOPEDIC SURGERY       History reviewed. No pertinent family history.        Social History     Socioeconomic History     Marital status:      Spouse name: Not on file     Number of children: Not on file     Years of education: Not on file     Highest education level: Not on file   Occupational History     Not on file   Tobacco Use     Smoking status: Never     Smokeless tobacco: Never   Substance and Sexual Activity     Alcohol use: Not Currently     Drug use: Never     Sexual activity: Not on file   Other Topics Concern     Not on file   Social History Narrative     Not on file     Social Determinants of Health     Financial Resource Strain: Low Risk  (10/3/2024)    Financial Resource Strain      Within the past 12 months, have you or your family members you live with been unable to get  utilities (heat, electricity) when it was really needed?: No   Food Insecurity: Low Risk  (10/3/2024)    Food Insecurity      Within the past 12 months, did you worry that your food would run out before you got money to buy more?: No      Within the past 12 months, did the food you bought just not last and you didn t have money to get more?: No   Transportation Needs: Low Risk  (10/3/2024)    Transportation Needs      Within the past 12 months, has lack of transportation kept you from medical appointments, getting your medicines, non-medical meetings or appointments, work, or from getting things that you need?: No   Physical Activity: Not on file   Stress: Not on file   Social Connections: Not on file   Interpersonal Safety: Not on file   Housing Stability: Low Risk  (10/3/2024)    Housing Stability      Do you have housing? : Yes      Are you worried about losing your housing?: No   Recent Concern: Housing Stability - High Risk (9/30/2024)    Housing Stability      Do you have housing? : No      Are you worried about losing your housing?: No             Physical Examination   VITALS: BP (!) 189/73 (BP Location: Left arm)   Pulse 109   Temp 98.6  F (37  C) (Oral)   Resp 18   Ht 1.524 m (5')   Wt 84.4 kg (186 lb 1.1 oz)   SpO2 93%   BMI 36.34 kg/m    BMI: Body mass index is 36.34 kg/m .  Wt Readings from Last 3 Encounters:   10/04/24 84.4 kg (186 lb 1.1 oz)   10/01/24 82.1 kg (181 lb)       Intake/Output Summary (Last 24 hours) at 10/4/2024 0850  Last data filed at 10/4/2024 0345  Gross per 24 hour   Intake 320 ml   Output 0 ml   Net 320 ml       General: pleasant female. No acute distress.   HENT: external ears normal. Nares patent. Mucous membranes moist.  Eyes: perrla, extraocular muscles intact. No scleral icterus.   Neck: No JVD  Lungs: clear to auscultation  COR: Irregular rate and rhythm, No murmurs, rubs, or gallops  Abd: nondistended, BS present  Extrem: No edema         Non-cardiac Imaging Studies  "Reviewed             Lab Results Reviewed    Chemistry/lipid CBC Cardiac Enzymes/BNP/TSH/INR   No results for input(s): \"CHOL\", \"HDL\", \"LDL\", \"TRIG\", \"CHOLHDLRATIO\" in the last 26442 hours.  No results for input(s): \"LDL\" in the last 81743 hours.  Recent Labs   Lab Test 10/04/24  0440      POTASSIUM 3.8   CHLORIDE 102   CO2 28   *   BUN 11.2   CR 0.62   GFRESTIMATED 82   MAYRA 8.3*     Recent Labs   Lab Test 10/04/24  0440 10/03/24  1128 09/30/24  0759   CR 0.62 0.61 0.76     No results for input(s): \"A1C\" in the last 66783 hours.       Recent Labs   Lab Test 10/04/24  0440   WBC 8.0   HGB 9.0*   HCT 28.3*   MCV 91        Recent Labs   Lab Test 10/04/24  0440 10/03/24  1257 09/30/24  0759   HGB 9.0* 8.7* 10.1*    No results for input(s): \"TROPONINI\" in the last 48382 hours.  No results for input(s): \"BNP\", \"NTBNPI\", \"NTBNP\" in the last 63651 hours.  No results for input(s): \"TSH\" in the last 89375 hours.  No results for input(s): \"INR\" in the last 69438 hours.        Current Inpatient Scheduled Medications   Scheduled Meds:  Current Facility-Administered Medications   Medication Dose Route Frequency Provider Last Rate Last Admin     acetaminophen (TYLENOL) tablet 650 mg  650 mg Oral At Bedtime Umair Jung MD   650 mg at 10/03/24 2103     clopidogrel (PLAVIX) tablet 75 mg  75 mg Oral Daily Domitila Tierney NP   75 mg at 10/04/24 0816     docusate sodium (COLACE) capsule 100 mg  100 mg Oral Daily Domitila Tierney NP   100 mg at 10/04/24 0816     famotidine (PEPCID) tablet 20 mg  20 mg Oral At Bedtime Domitila Tierney NP   20 mg at 10/03/24 2103     ferrous sulfate (FEROSUL) tablet 325 mg  325 mg Oral Daily Domitila Tierney NP   325 mg at 10/04/24 0815     losartan-hydrochlorothiazide (HYZAAR) 100-25 MG per tablet 1 tablet  1 tablet Oral Daily Umair Jung MD   1 tablet at 10/04/24 0815     metoprolol tartrate (LOPRESSOR) tablet 25 mg  25 mg Oral BID Domitila Tierney NP   25 mg at 10/04/24 0816     " predniSONE (DELTASONE) tablet 5 mg  5 mg Oral Daily Domitila Tierney, NP   5 mg at 10/04/24 0815     sodium chloride (PF) 0.9% PF flush 3 mL  3 mL Intracatheter Q8H Domitila Tierney NP   3 mL at 10/03/24 2105     Continuous Infusions:  Current Facility-Administered Medications   Medication Dose Route Frequency Provider Last Rate Last Admin       No current outpatient medications on file.          Medications Prior to Admission   Prior to Admission medications    Medication Sig Start Date End Date Taking? Authorizing Provider   acetaminophen (TYLENOL) 325 MG tablet Take 2 tablets (650 mg) by mouth 3 times daily. 10/2/24  Yes Umair Jung MD   acetaminophen (TYLENOL) 325 MG tablet Take 2 tablets (650 mg) by mouth every 8 hours as needed for mild pain. 10/2/24  Yes Umair Jung MD   amLODIPine (NORVASC) 10 MG tablet Take 10 mg by mouth daily.   Yes Unknown, Entered By History   calcium citrate-vitamin D (CITRACAL) 200-6.25 MG-MCG TABS per tablet Take 1 tablet by mouth daily.   Yes Unknown, Entered By History   clopidogrel (PLAVIX) 75 MG tablet Take 75 mg by mouth daily.   Yes Unknown, Entered By History   docusate sodium (COLACE) 100 MG tablet Take 100 mg by mouth daily.   Yes Unknown, Entered By History   famotidine (PEPCID) 20 MG tablet Take 20 mg by mouth at bedtime.   Yes Unknown, Entered By History   ferrous sulfate (FEROSUL) 325 (65 Fe) MG tablet Take 325 mg by mouth daily.   Yes Unknown, Entered By History   losartan-hydrochlorothiazide (HYZAAR) 100-25 MG tablet Take 1 tablet by mouth daily.   Yes Unknown, Entered By History   ondansetron (ZOFRAN ODT) 4 MG ODT tab Take 4 mg by mouth every 12 hours as needed for nausea.   Yes Unknown, Entered By History   oxyCODONE (ROXICODONE) 5 MG tablet Take 0.5-1 tablets (2.5-5 mg) by mouth every 6 hours as needed for moderate to severe pain. 10/2/24  Yes Umair Jung MD   predniSONE (DELTASONE) 2.5 MG tablet Take 2.5 mg by mouth daily. 10/7/24 10/13/24 Yes Unknown, Entered  By History   predniSONE (DELTASONE) 5 MG tablet Take 5 mg by mouth daily. 9/30/24 10/6/24 Yes Unknown, Entered By History   Wheat Dextrin (BENEFIBER PO) Take 1 Dose by mouth every morning.   Yes Unknown, Entered By History          Rola Palomino PA-C    Clinically Significant Risk Factors Present on Admission         # Hyponatremia: Lowest Na = 132 mmol/L in last 2 days, will monitor as appropriate        # Drug Induced Platelet Defect: home medication list includes an antiplatelet medication   # Hypertension: Noted on problem list    # Anemia: based on hgb <11       # Obesity: Estimated body mass index is 36.34 kg/m  as calculated from the following:    Height as of this encounter: 1.524 m (5').    Weight as of this encounter: 84.4 kg (186 lb 1.1 oz).             Cardiac Arrhythmia: Atrial fibrillation: Unspecified        Not present on admission    Not present on admission        Not present on admission    Not present on admission    Not present on admission        Not present on admission

## 2024-10-04 NOTE — PROGRESS NOTES
[facility]    Medicine Progress Note - Hospitalist Service    Date of Admission:  10/3/2024    Assessment & Plan   Susan Fair is a 89 year old female with history of hypertension, JAGDISH, hypercholesterolemia and prior CVA admitted due to chest pain and atrial fibrillation RVR. Patient was discharged home a day PTA from hospital where she found to have colonic mass, likely malignant with 4.2 cm left adnexal cystic lesion and prominent mesenteric lymph node. The patient reports when she awoke in the morning she felt fine, but for 1-2 hours she was having centralized chest pain. Patient noted that she was coughing up phlegm this morning, and that after coughing she felt better.  EKG obtained in the ER patient in atrial fibrillation RVR rate in 130s.  Patient given 3 doses of IV metoprolol slowed heart rate down to 100s.       # Chest pain  # Atrial fibrillation RVR  -EKG obtained and reviewed. Rate 130-120  -IV metoprolol 5 mg x 3 given in ED  -Metoprolol oral started. Dose increased 10/04  -Hold amlodipine  -Metoprolol IV 5 mg as needed for heart rate above 110  -Troponin 19-->18  -Cardiac telemetry monitor  -Cardiology consulted. Elevated CHADS-Vasc score noted and discussed with patient/family. Given her age, tendency for fall. Anemia, colon cancer, patient is deemed not a good candidate for AC.     #Colonic mass-possibly malignant  Abdominal pain, improved  -CT scan of the abdomen and pelvis showed irregular masslike wall thickening of the hepatic flexure with adjacent inflammatory changes, suspicious for primary colonic neoplasm for which -Colonoscopy completed, with biopsy  -Biopsy report discussed with the patient/family. It showed INVASIVE WELL TO MODERATELY DIFFERENTIATED ADENOCARCINOMA WITH FOCAL MUCINOUS DIFFERENTIATION   -Patient/family discussed the treatment options with Dr Gomez from [facility] this morning. Options discussed were right hemicolectomy vs immunotherapy vs hospice type of  care. Dr Gomez also recommended getting oncologist's input. Awaiting oncology's input. Patient can follow up with Dr Gomez in the clinic on 10/10/24 to discuss the treatment plan further  -Patient/family seems inclined towards immunotherapy; however if they choose surgery, will need cardiac clearance per [facility].     # Adnexal mass, left  -Patient/family request further work up with pelvis ultrasound as recommended by radiologist     # Anemia, chronic   -Hemoglobin is more or less stable  -Patient reports black stools for quite some time. Unclear if its due to iron pills or low grade oozing from the malignancy. Hold iron pills. Will give IV iron while here. Continue to monitor hgb and stool color    #Hypertension  -Monitor blood pressure, stable at this time  -Hold PTA amlodipine  -Resume PTA losartan-hydrochlorothiazide for now    #Concern for PMR  Patient was taking prednisone PTA for possible PMR.  Patient was evaluated by rheumatologist who felt diagnosis of PMR is unlikely and recommends tapering off prednisone.  Continue PTA prednisone 5 mg daily with plans to taper to 2.5 mg daily in the next 1 week      # Hyponatremia  Sodium improved  Trend BMP          Diet: Combination Diet Regular Diet Adult    DVT Prophylaxis: Pneumatic Compression Devices  Golden Catheter: Not present  Lines: None     Cardiac Monitoring: ACTIVE order. Indication: Tachyarrhythmias, acute (48 hours)  Code Status: Full Code      Clinically Significant Risk Factors Present on Admission         # Hyponatremia: Lowest Na = 132 mmol/L in last 2 days, will monitor as appropriate        # Drug Induced Platelet Defect: home medication list includes an antiplatelet medication   # Hypertension: Noted on problem list    # Anemia: based on hgb <11       # Obesity: Estimated body mass index is 36.34 kg/m  as calculated from the following:    Height as of this encounter: 1.524 m (5').    Weight as of this encounter: 84.4 kg (186 lb 1.1 oz).               Disposition Plan     Medically Ready for Discharge: Anticipated Tomorrow             SALOMON Guthrie  Hospitalist Service  [facility]  Securely message with Yuliana (more info)  Text page via [employer] Paging/Directory   ______________________________________________________________________    Interval History   Patient is new to me today. Patient reports doing fairly well. Seen and examined this morning together with Dr Gomez. Discussed with daughter, Meera at great length. Meera taking notes for her sister who is coming to town from [place].     Physical Exam   Vital Signs: Temp: 98.7  F (37.1  C) Temp src: Oral BP: (!) 144/63 Pulse: 118   Resp: 22 SpO2: 93 % O2 Device: None (Room air)    Weight: 186 lbs 1.09 oz      General: Not in obvious distress.  HEENT: NC, AT   Chest: Clear to auscultation bilaterally  Heart: S1S2 normal, regular. No M/R/G  Abdomen: Soft. NT, ND. Bowel sounds- active.  Extremities: No legs swelling  Neuro: Awake, grossly non-focal      Medical Decision Making             Data     I have personally reviewed the following data over the past 24 hrs:    8.0  \   9.0 (L)   / 334     138 102 11.2 /  101 (H)   3.8 28 0.62 \       Imaging results reviewed over the past 24 hrs:   Recent Results (from the past 24 hour(s))   CT Chest w/o Contrast    Narrative    EXAM: CT CHEST W/O CONTRAST  LOCATION: [facility]  DATE: 10/4/2024    INDICATION: Colon cancer; staging  COMPARISON: Chest x-ray 10/3/2024, CT AP 9/30/2024  TECHNIQUE: CT chest without IV contrast. Multiplanar reformats were obtained. Dose reduction techniques were used.  CONTRAST: None.    FINDINGS: Respiratory motion artifact in the mid lung.    LUNGS AND PLEURA: There is a 4 mm nodule laterally in the lingula (axial image 152), two, 4 mm groundglass nodules in the inferior left upper lobe (axial image 104) and tiny linear opacity superior to the (axial image 94).    Patient's developed  small, bilateral pleural effusions, right greater than left since last week with associated compressive atelectasis.    MEDIASTINUM/AXILLAE: There is a 8 mm right thyroid nodule. No adenopathy. Aorta and branches are normal.    CORONARY ARTERY CALCIFICATION: Moderate.    UPPER ABDOMEN: No liver lesions. Slight atrophy the pancreas.    MUSCULOSKELETAL: Exaggeration of the thoracic kyphoscoliosis. No suspicious lesions.      Impression    IMPRESSION:   1.  There is a 4 mm subpleural nodule in the lingula and two, faint groundglass nodules in the inferior left upper lobe. These are presumed to be incidental and can be followed.  2.  No other findings of concern for pulmonary metastasis.  3.  Patient's developed small, bilateral pleural effusions over the last week, right greater than left without other findings of pulmonary edema.  4.  Incidental 8 mm right thyroid nodule which needs no follow-up given age. Reference given below.      REFERENCE:  Reagan STORY et al. Managing Incidental Thyroid Nodules Detected on Imaging: White Paper of the ACR Incidental Thyroid Findings Committee. JACR 2015; 12:143-150.    Incidental thyroid nodule detected on CT or MRI without suspicious findings. Applies to general population without limited life expectancy or significant comorbidities.    Age greater than or equal to 35 years  Less than 1.5 cm: No further evaluation.  Greater than or equal to 1.5 cm: Evaluate with thyroid ultrasound.     Echocardiogram Complete   Result Value    LVEF  60-65%    Narrative    [id number]  [id number]  [id number]  [id number]^TIMMY^[place] [address]     Name: ANNITA HENLEY  MRN: [id number]  : 10/11/1928  Study Date: 10/04/2024 09:52 AM  Age: 89 yrs  Gender: Female  Patient Location: [facility]  Reason For Study: Atrial Fibrillation  Ordering Physician: NELSON WARNER  Performed By: BP     BSA: 1.8 m2  Height: 60 in  Weight: 186 lb  HR: 107  BP: 189/73  mmHg  ______________________________________________________________________________  Procedure  Complete Portable Echo Adult.  ______________________________________________________________________________  Interpretation Summary     The visual ejection fraction is 60-65%. No regional wall motion abnormalities  noted.  Mildly decreased right ventricular systolic function  Mild left atrial enlargement  There is mild to moderate (1-2+) mitral regurgitation.  There is mild to moderate (1-2+) aortic regurgitation.  There is mild to moderate (1-2+) tricuspid regurgitation.  The right ventricular systolic pressure is approximated at 55 mmHg.  ______________________________________________________________________________  Left Ventricle  The left ventricle is normal in size. There is normal left ventricular wall  thickness. The visual ejection fraction is 60-65%. Diastolic function not  assessed due to atrial fibrillation. No regional wall motion abnormalities  noted.     Right Ventricle  The right ventricle is normal size. TAPSE is abnormal, which is consistent  with abnormal right ventricular systolic function. Mildly decreased right  ventricular systolic function.     Atria  The left atrium is mildly dilated. Borderline right atrial enlargement.     Mitral Valve  Mitral valve leaflets appear normal. There is mild to moderate (1-2+) mitral  regurgitation.     Tricuspid Valve  Tricuspid valve leaflets appear normal. There is mild to moderate (1-2+)  tricuspid regurgitation. The right ventricular systolic pressure is  approximated at 40mmHg plus the right atrial pressure.     Aortic Valve  The aortic valve is trileaflet with aortic valve sclerosis. There is mild to  moderate (1-2+) aortic regurgitation.     Pulmonic Valve  The pulmonic valve is not well seen, but is grossly normal. There is mild to  moderate (1-2+) pulmonic valvular regurgitation. Right ventricular diastolic  pressure is approximated at 8mmHg plus the  right atrial pressure.     Vessels  The aorta root is normal. IVC diameter >2.1 cm collapsing <50% with sniff  suggests a high RA pressure estimated at 15 mmHg or greater.     Pericardium  There is no pericardial effusion.     ______________________________________________________________________________  MMode/2D Measurements & Calculations  IVSd: 1.1 cm  LVIDd: 3.9 cm  LVIDs: 2.3 cm  LVPWd: 0.93 cm  FS: 41.0 %     LV mass(C)d: 123.1 grams  LV mass(C)dI: 68.0 grams/m2  Ao root diam: 3.3 cm  LA dimension: 3.8 cm  LA/Ao: 1.2  LVOT diam: 1.9 cm  LVOT area: 2.8 cm2  Ao root diam index Ht(cm/m): 2.2  Ao root diam index BSA (cm/m2): 1.8  EF Biplane: 62.0 %  LA Volume (BP): 60.9 ml  LA Volume Index (BP): 33.6 ml/m2     LA Volume Indexed (AL/bp): 36.2 ml/m2  RV Base: 3.2 cm  RWT: 0.48  TAPSE: 1.3 cm     Time Measurements  MM HR: 107.0 BPM     Doppler Measurements & Calculations  MV E max luis: 122.0 cm/sec  MV dec time: 0.22 sec     Ao V2 max: 159.0 cm/sec  Ao max PG: 10.0 mmHg  Ao V2 mean: 109.0 cm/sec  Ao mean P.0 mmHg  Ao V2 VTI: 32.0 cm  SUNNY(I,D): 1.4 cm2  SUNNY(V,D): 1.4 cm2  AI P1/2t: 433.7 msec  LV V1 max P.4 mmHg  LV V1 max: 77.4 cm/sec  LV V1 VTI: 15.4 cm  MR PISA: 1.0 cm2  MR ERO: 0.08 cm2  MR volume: 11.8 ml  SV(LVOT): 43.7 ml  SI(LVOT): 24.1 ml/m2  PA acc time: 0.08 sec  PI end-d luis: 140.5 cm/sec  TR max luis: 317.0 cm/sec  TR max P.2 mmHg  AV Luis Ratio (DI): 0.49  SUNNY Index (cm2/m2): 0.75  E/E': 10.6  E/E' av.4  Lateral E/e': 10.6  Medial E/e': 12.2  Peak E' Luis: 11.5 cm/sec  RV S Luis: 13.8 cm/sec     ______________________________________________________________________________  Report approved by: Stuart Braxton 10/04/2024 02:03 PM

## 2024-10-05 ENCOUNTER — APPOINTMENT (OUTPATIENT)
Dept: PHYSICAL THERAPY | Facility: HOSPITAL | Age: 89
DRG: 309 | End: 2024-10-05
Attending: INTERNAL MEDICINE
Payer: COMMERCIAL

## 2024-10-05 ENCOUNTER — APPOINTMENT (OUTPATIENT)
Dept: OCCUPATIONAL THERAPY | Facility: HOSPITAL | Age: 89
DRG: 309 | End: 2024-10-05
Attending: INTERNAL MEDICINE
Payer: COMMERCIAL

## 2024-10-05 LAB
HGB BLD-MCNC: 10.3 G/DL (ref 11.7–15.7)
HOLD SPECIMEN: NORMAL

## 2024-10-05 PROCEDURE — 250N000013 HC RX MED GY IP 250 OP 250 PS 637

## 2024-10-05 PROCEDURE — 97166 OT EVAL MOD COMPLEX 45 MIN: CPT | Mod: GO

## 2024-10-05 PROCEDURE — 250N000013 HC RX MED GY IP 250 OP 250 PS 637: Performed by: INTERNAL MEDICINE

## 2024-10-05 PROCEDURE — 97116 GAIT TRAINING THERAPY: CPT | Mod: GP

## 2024-10-05 PROCEDURE — 120N000004 HC R&B MS OVERFLOW

## 2024-10-05 PROCEDURE — 250N000011 HC RX IP 250 OP 636: Performed by: INTERNAL MEDICINE

## 2024-10-05 PROCEDURE — 99232 SBSQ HOSP IP/OBS MODERATE 35: CPT | Performed by: INTERNAL MEDICINE

## 2024-10-05 PROCEDURE — 97530 THERAPEUTIC ACTIVITIES: CPT | Mod: GO

## 2024-10-05 PROCEDURE — 258N000003 HC RX IP 258 OP 636: Performed by: INTERNAL MEDICINE

## 2024-10-05 PROCEDURE — 250N000012 HC RX MED GY IP 250 OP 636 PS 637

## 2024-10-05 PROCEDURE — 999N000157 HC STATISTIC RCP TIME EA 10 MIN

## 2024-10-05 PROCEDURE — 97162 PT EVAL MOD COMPLEX 30 MIN: CPT | Mod: GP

## 2024-10-05 PROCEDURE — 36415 COLL VENOUS BLD VENIPUNCTURE: CPT | Performed by: INTERNAL MEDICINE

## 2024-10-05 PROCEDURE — 85018 HEMOGLOBIN: CPT | Performed by: INTERNAL MEDICINE

## 2024-10-05 RX ORDER — METOPROLOL TARTRATE 25 MG/1
100 TABLET, FILM COATED ORAL 2 TIMES DAILY
Status: DISCONTINUED | OUTPATIENT
Start: 2024-10-05 | End: 2024-10-09 | Stop reason: HOSPADM

## 2024-10-05 RX ADMIN — METOPROLOL TARTRATE 100 MG: 25 TABLET, FILM COATED ORAL at 21:04

## 2024-10-05 RX ADMIN — METOPROLOL TARTRATE 50 MG: 25 TABLET, FILM COATED ORAL at 08:20

## 2024-10-05 RX ADMIN — CLOPIDOGREL BISULFATE 75 MG: 75 TABLET ORAL at 08:19

## 2024-10-05 RX ADMIN — IRON SUCROSE 200 MG: 20 INJECTION, SOLUTION INTRAVENOUS at 11:58

## 2024-10-05 RX ADMIN — DOCUSATE SODIUM 100 MG: 100 CAPSULE, LIQUID FILLED ORAL at 08:21

## 2024-10-05 RX ADMIN — FAMOTIDINE 20 MG: 20 TABLET ORAL at 21:05

## 2024-10-05 RX ADMIN — LOSARTAN POTASSIUM AND HYDROCHLOROTHIAZIDE 1 TABLET: 25; 100 TABLET ORAL at 08:20

## 2024-10-05 RX ADMIN — PREDNISONE 5 MG: 5 TABLET ORAL at 08:20

## 2024-10-05 RX ADMIN — ACETAMINOPHEN 650 MG: 325 TABLET ORAL at 21:05

## 2024-10-05 ASSESSMENT — ACTIVITIES OF DAILY LIVING (ADL)
ADLS_ACUITY_SCORE: 40
ADLS_ACUITY_SCORE: 37
ADLS_ACUITY_SCORE: 40
ADLS_ACUITY_SCORE: 37
ADLS_ACUITY_SCORE: 40
ADLS_ACUITY_SCORE: 37
DEPENDENT_IADLS:: MEDICATION MANAGEMENT;TRANSPORTATION;CLEANING
ADLS_ACUITY_SCORE: 40
ADLS_ACUITY_SCORE: 42
ADLS_ACUITY_SCORE: 40
ADLS_ACUITY_SCORE: 40
ADLS_ACUITY_SCORE: 37
ADLS_ACUITY_SCORE: 40
ADLS_ACUITY_SCORE: 37

## 2024-10-05 NOTE — PROGRESS NOTES
[facility]    Medicine Progress Note - Hospitalist Service    Date of Admission:  10/3/2024    Assessment & Plan   Susan Fair is a 89 year old female with history of hypertension, JAGDISH, hypercholesterolemia and prior CVA admitted due to chest pain and atrial fibrillation RVR. Patient was discharged home a day PTA from hospital where she found to have colonic mass, likely malignant with 4.2 cm left adnexal cystic lesion and prominent mesenteric lymph node. The patient reports when she awoke in the morning she felt fine, but for 1-2 hours she was having centralized chest pain. Patient noted that she was coughing up phlegm this morning, and that after coughing she felt better.  EKG obtained in the ER patient in atrial fibrillation RVR rate in 130s.  Patient given 3 doses of IV metoprolol slowed heart rate down to 100s.       # Chest pain  # Atrial fibrillation RVR  -EKG obtained and reviewed. Rate 130-120  -IV metoprolol 5 mg x 3 given in ED  -Metoprolol oral started. Dose increased 10/04, increased further to 100mg BID 10/05  -Hold amlodipine  -Metoprolol IV 5 mg as needed for heart rate above 110  -Troponin 19-->18  -Cardiac telemetry monitor  -Cardiology consulted. Elevated CHADS-Vasc score noted and discussed with patient/family. Given her age, tendency for fall. Anemia, colon cancer, patient is deemed not a good candidate for AC.     #Colonic mass-possibly malignant  Abdominal pain, improved  -CT scan of the abdomen and pelvis showed irregular masslike wall thickening of the hepatic flexure with adjacent inflammatory changes, suspicious for primary colonic neoplasm for which -Colonoscopy completed, with biopsy  -Biopsy report discussed with the patient/family. It showed INVASIVE WELL TO MODERATELY DIFFERENTIATED ADENOCARCINOMA WITH FOCAL MUCINOUS DIFFERENTIATION   -Patient/family discussed the treatment options with Dr Gomez from [facility] 10/04. Options discussed were right hemicolectomy vs  immunotherapy vs hospice type of care. Dr Gomez also recommended getting oncologist's input. Oncology consulted. Patient can follow up with Dr Gomez in the clinic on 10/10/24 to discuss the treatment plan further  -Patient/family seems inclined towards immunotherapy; however if they choose surgery, will need cardiac clearance per [facility].     # Adnexal mass, left  -Patient/family request further work up with pelvis ultrasound as recommended by radiologist. Pelvis ultrasound done on 10/04; findings were consistent with ovarian cyst. Recommendation is to repeat pelvis ultrasound in 6 months    # Anemia, chronic   #Melena  -Hemoglobin is more or less stable  -Patient reports black stools for quite some time. Unclear if its due to iron pills or low grade oozing from the malignancy. Hold iron pills. Will give IV iron while here. Continue to monitor hgb and stool color    #Hypertension  -Monitor blood pressure, stable at this time  -Hold PTA amlodipine  -Resume PTA losartan-hydrochlorothiazide for now    #Concern for PMR  Patient was taking prednisone PTA for possible PMR.  Patient was evaluated by rheumatologist who felt diagnosis of PMR is unlikely and recommends tapering off prednisone.  Continue PTA prednisone 5 mg daily with plans to taper to 2.5 mg daily in the next 1 week      # Hyponatremia  Sodium improved  Trend BMP          Diet: Combination Diet Regular Diet Adult    DVT Prophylaxis: Pneumatic Compression Devices  Golden Catheter: Not present  Lines: None     Cardiac Monitoring: ACTIVE order. Indication: Tachyarrhythmias, acute (48 hours)  Code Status: Full Code      Clinically Significant Risk Factors                  # Hypertension: Noted on problem list             # Obesity: Estimated body mass index is 35.04 kg/m  as calculated from the following:    Height as of this encounter: 1.524 m (5').    Weight as of this encounter: 81.4 kg (179 lb 6.4 oz)., PRESENT ON ADMISSION     # Financial/Environmental Concerns:  none         Disposition Plan     Medically Ready for Discharge: Anticipated Tomorrow             SALOMON Guthrie  Hospitalist Service  [facility]  Securely message with [employer] (more info)  Text page via [employer] Paging/Directory   ______________________________________________________________________    Interval History   Patient seen and examined this morning. Both daughters were at bedside asking very appropriate questions on her care. Patient reported doing better with no abdominal pain. Has had a good sleep last night. Stool is still black.     Physical Exam   Vital Signs: Temp: 97.9  F (36.6  C) Temp src: Oral BP: 134/60 Pulse: 95   Resp: 20 SpO2: 97 % O2 Device: BiPAP/CPAP    Weight: 179 lbs 6.4 oz      General: Not in obvious distress.  HEENT: NC, AT   Chest: Clear to auscultation bilaterally  Heart: S1S2 normal, regular. No M/R/G  Abdomen: Soft. NT, ND. Bowel sounds- active.  Extremities: No legs swelling  Neuro: Awake, grossly non-focal      Medical Decision Making             Data     I have personally reviewed the following data over the past 24 hrs:    N/A  \   10.3 (L)   / N/A     N/A N/A N/A /  N/A   N/A N/A N/A \       Imaging results reviewed over the past 24 hrs:   Recent Results (from the past 24 hour(s))   US Pelvis Complete without Transvaginal    Narrative    EXAM: US PELVIC TRANSABDOMINAL  LOCATION: [facility]  DATE: 10/4/2024    INDICATION: left adnexal mass  COMPARISON: 9/30/2024  TECHNIQUE: Transabdominal scans were performed.    FINDINGS:    UTERUS: 5.7 x 4.2 x 2.7 cm. Heterogeneous uterus. The endometrium was not well-visualized.     RIGHT OVARY: Not visualized due to overlying bowel gas.    LEFT OVARY: 4 x 2.7 x 3.1 cm cm. Normal. 4 cm left ovarian cyst.    No significant free fluid.      Impression    IMPRESSION:  1.  4 cm left ovarian cyst. Recommend follow-up as recommended below.      Postmenopausal asymptomatic simple  cyst:      >3 to <=5 cm: Benign simple cyst. Clinically inconsequential finding. Follow-up pelvic ultrasound in 6 months recommended.      REFERENCE:  Management of Asymptomatic Ovarian and Other Adnexal Cysts Imaged at US: Society of Radiologists in Ultrasound Consensus Conference Statement. Radiology September 2010; [phone].    Simple Adnexal Cysts: SRU Consensus Conference Update on Follow-up and Reporting. Radiology September 2019. [phone].

## 2024-10-05 NOTE — PROGRESS NOTES
10/05/24 1040   Appointment Info   Signing Clinician's Name / Credentials (PT) Brianda Rodriguez DPT   Living Environment   People in Home alone   Current Living Arrangements independent living facility   Home Accessibility no concerns   Transportation Anticipated family or friend will provide   Self-Care   Usual Activity Tolerance good   Current Activity Tolerance fair   Equipment Currently Used at Home walker, rolling  (4WW)   Fall history within last six months yes   Number of times patient has fallen within last six months 1   General Information   Onset of Illness/Injury or Date of Surgery 10/03/24   Referring Physician Calos Ramirez MBBS   Patient/Family Therapy Goals Statement (PT) home vs tcu   Pertinent History of Current Problem (include personal factors and/or comorbidities that impact the POC) 89 year old female with history of hypertension, JAGDISH, hypercholesterolemia and prior CVA admitted due to chest pain and atrial fibrillation RVR. Patient was discharged home a day PTA from hospital where she found to have colonic mass, likely malignant with 4.2 cm left adnexal cystic lesion and prominent mesenteric lymph node. The patient reports when she awoke in the morning she felt fine, but for 1-2 hours she was having centralized chest pain. Patient noted that she was coughing up phlegm this morning, and that after coughing she felt better.  EKG obtained in the ER patient in atrial fibrillation RVR rate in 130s.  Patient given 3 doses of IV metoprolol slowed heart rate down to 100s.   Strength (Manual Muscle Testing)   Strength (Manual Muscle Testing) Deficits observed during functional mobility   Bed Mobility   Bed Mobility supine-sit;sit-supine   Supine-Sit [place] (Bed Mobility) minimum assist (75% patient effort)   Sit-Supine [place] (Bed Mobility) supervision   Assistive Device (Bed Mobility) bed rails   Transfers   Transfers sit-stand transfer   Sit-Stand Transfer   Sit-Stand [place]  (Transfers) contact guard;verbal cues   Assistive Device (Sit-Stand Transfers) walker, 4-wheeled   Gait/Stairs (Locomotion)   [place] Level (Gait) contact guard   Assistive Device (Gait) walker, 4-wheeled   Distance in Feet (Gait) 15'   Pattern (Gait) step-through   Deviations/Abnormal Patterns (Gait) festinating/shuffling;gait speed decreased   Clinical Impression   Criteria for Skilled Therapeutic Intervention Yes, treatment indicated   PT Diagnosis (PT) Impaired functional mobility   Influenced by the following impairments Weakness, fatigue, balance deficits   Functional limitations due to impairments Impaired strength, transfers, gait   Clinical Presentation (PT Evaluation Complexity) stable   Clinical Presentation Rationale Presents as diagnosed   Clinical Decision Making (Complexity) moderate complexity   Planned Therapy Interventions (PT) balance training;bed mobility training;gait training;home exercise program;strengthening;transfer training   Risk & Benefits of therapy have been explained patient   PT Total Evaluation Time   PT Nupural, Moderate Complexity Minutes (05469) 10   Physical Therapy Goals   PT Frequency 6x/week   PT Predicted Duration/Target Date for Goal Attainment 10/12/24   PT Goals Bed Mobility;Transfers;Gait   PT: Bed Mobility Modified independent;Supine to/from sit   PT: Transfers Modified independent;Sit to/from stand;Bed to/from chair;Assistive device   PT: Gait Modified independent;Rolling walker;Greater than 200 feet   PT Discharge Planning   PT Plan progress transfers, amb as akua, LE ex   PT Discharge Recommendation (DC Rec) Transitional Care Facility   PT Rationale for DC Rec Pt req Ax1 with mobility at this time. Recommend TCU to improve overall balance and strength.   PT Brief overview of current status Amb 140' with 4WW and CGA. Needing reminders for safety awareness.   PT Equipment Needed at Discharge walker, rolling  (owns 4WW)   Total Session Time   Total Session Time (sum of  timed and untimed services) 10       Brianda Rodriguez, PT, DPT  10/5/2024

## 2024-10-05 NOTE — PLAN OF CARE
"  Problem: Adult Inpatient Plan of Care  Goal: Plan of Care Review  Description: The Plan of Care Review/Shift note should be completed every shift.  The Outcome Evaluation is a brief statement about your assessment that the patient is improving, declining, or no change.  This information will be displayed automatically on your shift  note.  Outcome: Progressing  Goal: Patient-Specific Goal (Individualized)  Description: You can add care plan individualizations to a care plan. Examples of Individualization might be:  \"Parent requests to be called daily at 9am for status\", \"I have a hard time hearing out of my right ear\", or \"Do not touch me to wake me up as it startles  me\".  Outcome: Progressing  Goal: Absence of Hospital-Acquired Illness or Injury  Outcome: Progressing  Intervention: Identify and Manage Fall Risk  Recent Flowsheet Documentation  Taken 10/5/2024 1255 by Maricarmen Blanco RN  Safety Promotion/Fall Prevention: activity supervised  Taken 10/5/2024 0830 by Maricarmen Blanco RN  Safety Promotion/Fall Prevention: activity supervised  Intervention: Prevent Skin Injury  Recent Flowsheet Documentation  Taken 10/5/2024 1255 by Maricarmen Blanco RN  Skin Protection: adhesive use limited  Device Skin Pressure Protection: absorbent pad utilized/changed  Taken 10/5/2024 0830 by Maricarmen Blanco RN  Skin Protection: adhesive use limited  Device Skin Pressure Protection: absorbent pad utilized/changed  Intervention: Prevent Infection  Recent Flowsheet Documentation  Taken 10/5/2024 1255 by Maricarmen Blanco RN  Infection Prevention: cohorting utilized  Taken 10/5/2024 0830 by Maricarmen Blanco RN  Infection Prevention: cohorting utilized  Goal: Optimal Comfort and Wellbeing  Outcome: Progressing  Goal: Readiness for Transition of Care  Outcome: Progressing     Problem: Skin Injury Risk Increased  Goal: Skin Health and Integrity  Outcome: Progressing  Intervention: Plan: Nurse Driven " Intervention: Moisture Management  Recent Flowsheet Documentation  Taken 10/5/2024 1255 by Maricarmen Blanco RN  Moisture Interventions: Encourage regular toileting  Taken 10/5/2024 0830 by Maricarmen Blanco RN  Moisture Interventions: Encourage regular toileting  Intervention: Plan: Nurse Driven Intervention: Friction and Shear  Recent Flowsheet Documentation  Taken 10/5/2024 1255 by Maricarmen Blanco RN  Friction/Shear Interventions: HOB 30 degrees or less  Taken 10/5/2024 0830 by Maricarmen Blanco RN  Friction/Shear Interventions: HOB 30 degrees or less  Intervention: Optimize Skin Protection  Recent Flowsheet Documentation  Taken 10/5/2024 1255 by Maricarmen Blanco RN  Pressure Reduction Techniques: frequent weight shift encouraged  Pressure Reduction Devices: other (see comments)  Skin Protection: adhesive use limited  Activity Management: activity adjusted per tolerance  Taken 10/5/2024 0830 by Maricarmen Blanco RN  Pressure Reduction Techniques: frequent weight shift encouraged  Pressure Reduction Devices: other (see comments)  Skin Protection: adhesive use limited  Activity Management: activity adjusted per tolerance     Problem: Chest Pain  Goal: Resolution of Chest Pain Symptoms  Outcome: Progressing     Problem: Gas Exchange Impaired  Goal: Optimal Gas Exchange  Outcome: Progressing     Problem: Risk for Delirium  Goal: Optimal Coping  Outcome: Progressing  Intervention: Optimize Psychosocial Adjustment to Delirium  Recent Flowsheet Documentation  Taken 10/5/2024 1255 by Maricarmen Blanco RN  Supportive Measures: active listening utilized  Taken 10/5/2024 0830 by Maricarmen Blanco RN  Supportive Measures: active listening utilized  Goal: Improved Behavioral Control  Outcome: Progressing  Intervention: Prevent and Manage Agitation  Recent Flowsheet Documentation  Taken 10/5/2024 1255 by Maricarmen Blanco RN  Environment Familiarity/Consistency: daily routine followed  Taken  10/5/2024 0830 by Maricarmen Blanco RN  Environment Familiarity/Consistency: daily routine followed  Intervention: Minimize Safety Risk  Recent Flowsheet Documentation  Taken 10/5/2024 1255 by Maricarmen Blanco RN  Communication Enhancement Strategies: call light answered in person  Enhanced Safety Measures: pain management  Taken 10/5/2024 0830 by Maricarmen Blanco RN  Communication Enhancement Strategies: call light answered in person  Enhanced Safety Measures: pain management  Goal: Improved Attention and Thought Clarity  Outcome: Progressing  Intervention: Maximize Cognitive Function  Recent Flowsheet Documentation  Taken 10/5/2024 1255 by Maricarmen Blanco RN  Sensory Stimulation Regulation: care clustered  Reorientation Measures: clock in view  Taken 10/5/2024 0830 by Maricarmen Blanco RN  Sensory Stimulation Regulation: care clustered  Reorientation Measures: clock in view  Goal: Improved Sleep  Outcome: Progressing     Problem: Comorbidity Management  Goal: Maintenance of Asthma Control  Outcome: Progressing  Intervention: Maintain Asthma Symptom Control  Recent Flowsheet Documentation  Taken 10/5/2024 1255 by Maricarmen Blanco RN  Medication Review/Management: medications reviewed  Taken 10/5/2024 0830 by Maricarmen Blanco RN  Medication Review/Management: medications reviewed  Goal: Maintenance of Behavioral Health Symptom Control  Outcome: Progressing  Intervention: Maintain Behavioral Health Symptom Control  Recent Flowsheet Documentation  Taken 10/5/2024 1255 by Maricarmen Blanco RN  Medication Review/Management: medications reviewed  Taken 10/5/2024 0830 by Maricarmen Blanco RN  Medication Review/Management: medications reviewed  Goal: Maintenance of COPD Symptom Control  Outcome: Progressing  Intervention: Maintain COPD Symptom Control  Recent Flowsheet Documentation  Taken 10/5/2024 1255 by Maricramen Blanco RN  Supportive Measures: active listening utilized  Medication  Review/Management: medications reviewed  Taken 10/5/2024 0830 by Maricarmen Blanco RN  Supportive Measures: active listening utilized  Medication Review/Management: medications reviewed  Goal: Blood Glucose Levels Within Targeted Range  Outcome: Progressing  Intervention: Monitor and Manage Glycemia  Recent Flowsheet Documentation  Taken 10/5/2024 1255 by Maricarmen Blanco RN  Medication Review/Management: medications reviewed  Taken 10/5/2024 0830 by Maricarmen Blanco RN  Medication Review/Management: medications reviewed  Goal: Maintenance of Heart Failure Symptom Control  Outcome: Progressing  Intervention: Maintain Heart Failure Management  Recent Flowsheet Documentation  Taken 10/5/2024 1255 by Maricarmen Blanco RN  Medication Review/Management: medications reviewed  Taken 10/5/2024 0830 by Maricarmen Blanco RN  Medication Review/Management: medications reviewed  Goal: Blood Pressure in Desired Range  Outcome: Progressing  Intervention: Maintain Blood Pressure Management  Recent Flowsheet Documentation  Taken 10/5/2024 1255 by Maricarmen Blanco RN  Medication Review/Management: medications reviewed  Taken 10/5/2024 0830 by Maricarmen Blanco RN  Medication Review/Management: medications reviewed  Goal: Maintenance of Osteoarthritis Symptom Control  Outcome: Progressing  Intervention: Maintain Osteoarthritis Symptom Control  Recent Flowsheet Documentation  Taken 10/5/2024 1255 by Maricarmen Blanco RN  Assistive Device Utilized:   walker   gait belt  Activity Management: activity adjusted per tolerance  Medication Review/Management: medications reviewed  Taken 10/5/2024 0830 by Maricarmen Blanco RN  Assistive Device Utilized:   walker   gait belt  Activity Management: activity adjusted per tolerance  Medication Review/Management: medications reviewed  Goal: Bariatric Home Regimen Maintained  Outcome: Progressing  Intervention: Maintain and Manage Postbariatric Surgery Care  Recent  Flowsheet Documentation  Taken 10/5/2024 1255 by Maricarmen Blanco RN  Medication Review/Management: medications reviewed  Taken 10/5/2024 0830 by Maricarmen Blanco RN  Medication Review/Management: medications reviewed  Goal: Maintenance of Seizure Control  Outcome: Progressing  Intervention: Maintain Seizure Symptom Control  Recent Flowsheet Documentation  Taken 10/5/2024 1255 by Maricarmen Blanco RN  Sensory Stimulation Regulation: care clustered  Medication Review/Management: medications reviewed  Taken 10/5/2024 0830 by Maricarmen Blanco RN  Sensory Stimulation Regulation: care clustered  Medication Review/Management: medications reviewed   Goal Outcome Evaluation:       Patient is alert and able to let her needs be known. Denies pan when asked. Hgb 10.3. IV Iron infusion ordered. Afib on the monitor, HR 90s while at rest but does increased to 130-140 with exertion. Cardiology following, increased metoprolol to 100mg BID. Assist of one/walker with ambulation. Working with therapy. Family at bedside, questions answered. Continue plan of care.

## 2024-10-05 NOTE — CONSULTS
Care Management Initial Consult    General Information  Assessment completed with: Patient, Children, pt, dtrs Hugh  Type of CM/SW Visit: Initial Assessment    Primary Care Provider verified and updated as needed: Yes   Readmission within the last 30 days:           Advance Care Planning: Advance Care Planning Reviewed: no concerns identified          Communication Assessment  Patient's communication style: spoken language (English or Bilingual)    Hearing Difficulty or Deaf: no   Wear Glasses or Blind: yes    Cognitive  Cognitive/Neuro/Behavioral: WDL                      Living Environment:   People in home: alone     Current living Arrangements: apartment, independent living facility      Able to return to prior arrangements: yes  Living Arrangement Comments: apartment    Family/Social Support:  Care provided by: self  Provides care for: no one  Marital Status:   Support system: Children          Description of Support System: Supportive    Support Assessment: Adequate family and caregiver support    Current Resources:   Patient receiving home care services: No        Community Resources: None  Equipment currently used at home: walker, standard  Supplies currently used at home: None    Employment/Financial:  Employment Status: retired        Financial Concerns: none   Referral to Financial Worker: No       Does the patient's insurance plan have a 3 day qualifying hospital stay waiver?  No    Lifestyle & Psychosocial Needs:  Social Determinants of Health     Food Insecurity: Low Risk  (10/3/2024)    Food Insecurity     Within the past 12 months, did you worry that your food would run out before you got money to buy more?: No     Within the past 12 months, did the food you bought just not last and you didn t have money to get more?: No   Depression: Not at risk (2/1/2024)    Received from [facility]    PHQ-2     PHQ-2 Score: 0   Housing Stability: Low Risk  (10/3/2024)    Housing  Stability     Do you have housing? : Yes     Are you worried about losing your housing?: No   Recent Concern: Housing Stability - High Risk (9/30/2024)    Housing Stability     Do you have housing? : No     Are you worried about losing your housing?: No   Tobacco Use: Low Risk  (10/3/2024)    Patient History     Smoking Tobacco Use: Never     Smokeless Tobacco Use: Never     Passive Exposure: Not on file   Financial Resource Strain: Low Risk  (10/3/2024)    Financial Resource Strain     Within the past 12 months, have you or your family members you live with been unable to get utilities (heat, electricity) when it was really needed?: No   Alcohol Use: Not on file   Transportation Needs: Low Risk  (10/3/2024)    Transportation Needs     Within the past 12 months, has lack of transportation kept you from medical appointments, getting your medicines, non-medical meetings or appointments, work, or from getting things that you need?: No   Physical Activity: Not on file   Interpersonal Safety: Low Risk  (10/4/2024)    Interpersonal Safety     Do you feel physically and emotionally safe where you currently live?: Yes     Within the past 12 months, have you been hit, slapped, kicked or otherwise physically hurt by someone?: No     Within the past 12 months, have you been humiliated or emotionally abused in other ways by your partner or ex-partner?: No   Stress: Not on file   Social Connections: Not on file   Health Literacy: Not on file       Functional Status:  Prior to admission patient needed assistance:   Dependent ADLs:: Ambulation-walker  Dependent IADLs:: Medication Management, Transportation, Cleaning  Assesssment of Functional Status: Not at baseline with ADL Functioning    Mental Health Status:  Mental Health Status: No Current Concerns       Chemical Dependency Status:  Chemical Dependency Status: No Current Concerns             Values/Beliefs:  Spiritual, Cultural Beliefs, Druze Practices, Values that affect  care: no               Discussed  Partnership in Safe Discharge Planning  document with patient/family: No    Additional Information:  Sw met with patient at bedside. Her two daughters Sailaja and Meera were present.   Patient resides at a [facility] living apartment ([facility] She is independent at baseline, uses a walker, receives 10 meals a month. Her daughter Meera has been assisting with medication management.  Sw reviewed rehab recommendations for TCU- TCU choice list provided to pt and family. SW discussed TCU vs home care. Pt undecided on discharge plan at this time, would like to wait for further progression.    Next Steps: medical progression, discharge planning    Татьяна Reed, MABELSW

## 2024-10-05 NOTE — PROGRESS NOTES
[facility]  Cardiac Electrophysiology  [address]  [place] [address]   Office: [phone]  Fax: [phone]     Cardiology Progress Note    Patient: Susan Fari   : 10/11/1928       Assessment/Recommendations     New onset atrial fibrillation  [id number], HAS-BLED 6 with anemia, colon cancer  - high stroke risk, though poor candidate for oral anticoagulation with anemia, colon adenocarcinoma, advanced age - encouraged further consideration for NOAC initiation - this can be in light of upcoming discussion with oncology regarding colon cancer management plan  - increase metoprolol to 100mg twice daily    CVAs/TIAs  - continue clopidogrel         Interval Events   She feels well.  Telemetry shows AF, resting ventricular rates ~90s, up to 150s with activity.       Physical Examination  Review of Systems   VITALS: BP (!) 145/77 (BP Location: Left arm)   Pulse 99   Temp 98.1  F (36.7  C) (Axillary)   Resp 20   Ht 1.524 m (5')   Wt 81.4 kg (179 lb 6.4 oz)   SpO2 97%   BMI 35.04 kg/m    Wt Readings from Last 3 Encounters:   10/05/24 81.4 kg (179 lb 6.4 oz)   10/01/24 82.1 kg (181 lb)       Intake/Output Summary (Last 24 hours) at 10/5/2024 0832  Last data filed at 10/5/2024 0700  Gross per 24 hour   Intake 443 ml   Output 1200 ml   Net -757 ml     CONSTITUTIONAL: no distress  EYES:  Conjunctivae pink, sclerae clear.    E/N/T:  Oral mucosa pink, moist mucous membranes  RESPIRATORY:  Respiratory effort is normal  CARDIOVASCULAR:  normal S1 and S2  GASTROINTESTINAL:  Abdomen without masses or tenderness  EXTREMITIES:  No clubbing or cyanosis.    MUSCULOSKELETAL:  Overall grossly normal muscle strength  SKIN:  Overall, skin warm and dry, no lesions.  NEURO/PSYCH:  Oriented x 3 with normal affect.   Constitutional:  No weight loss or loss of appetite    Cardiovascular: As detailed above  Respiratory: No cough  Genitourinary: No trouble urinating           Medical History   Surgical History   Past Medical History:   Diagnosis Date    Cerebral infarction (H)     Sleep apnea     Past Surgical History:   Procedure Laterality Date    COLONOSCOPY      COLONOSCOPY N/A 10/1/2024    Procedure: COLONOSCOPY;  Surgeon: Alejandro Aguiar MD;  [place] [facility] [place]THOPEDIC SURGERY           Family History Social History   History reviewed. No pertinent family history.     Social History     Tobacco Use    Smoking status: Never    Smokeless tobacco: Never   Substance Use Topics    Alcohol use: Not Currently    Drug use: Never         Medications  Allergies     Current Facility-Administered Medications:     acetaminophen (TYLENOL) tablet 650 mg, 650 mg, Oral, At Bedtime, Umair Jung MD, 650 mg at 10/04/24 2037    acetaminophen (TYLENOL) tablet 650 mg, 650 mg, Oral, Q6H PRN, Umair Jung MD, 650 mg at 10/04/24 1542    calcium carbonate (TUMS) chewable tablet 1,000 mg, 1,000 mg, Oral, 4x Daily PRN, Domitila Tierney NP    clopidogrel (PLAVIX) tablet 75 mg, 75 mg, Oral, Daily, Domitila Tierney NP, 75 mg at 10/05/24 0819    docusate sodium (COLACE) capsule 100 mg, 100 mg, Oral, Daily, Domitila Tierney NP, 100 mg at 10/05/24 0821    famotidine (PEPCID) tablet 20 mg, 20 mg, Oral, At Bedtime, Domitila Tierney NP, 20 mg at 10/04/24 2036    [Held by provider] ferrous sulfate (FEROSUL) tablet 325 mg, 325 mg, Oral, Daily, Domitila Tierney NP, 325 mg at 10/04/24 0815    lidocaine (LMX4) cream, , Topical, Q1H PRN, Domitila Tierney NP    lidocaine 1 % 0.1-1 mL, 0.1-1 mL, Other, Q1H PRN, Domitila Tierney NP    losartan-hydrochlorothiazide (HYZAAR) 100-25 MG per tablet 1 tablet, 1 tablet, Oral, Daily, Umair Jung MD, 1 tablet at 10/05/24 0820    melatonin tablet 5 mg, 5 mg, Oral, At Bedtime PRN, Efren Cardona MD, 5 mg at 10/03/24 2346    metoprolol tartrate (LOPRESSOR) tablet 50 mg, 50 mg, Oral, BID, Rola Palomino PA-C, 50 mg at 10/05/24 0820    naloxone (NARCAN) injection 0.2 mg, 0.2 mg,  "Intravenous, Q2 Min PRN **OR** naloxone (NARCAN) injection 0.4 mg, 0.4 mg, Intravenous, Q2 Min PRN **OR** naloxone (NARCAN) injection 0.2 mg, 0.2 mg, Intramuscular, Q2 Min PRN **OR** naloxone (NARCAN) injection 0.4 mg, 0.4 mg, Intramuscular, Q2 Min PRN, Domitila Tierney NP    ondansetron (ZOFRAN ODT) ODT tab 4 mg, 4 mg, Oral, Q12H PRN, Domitila Tierney NP    oxyCODONE (ROXICODONE) tablet 5 mg, 5 mg, Oral, Q4H PRN, Umair Jung MD    oxyCODONE IR (ROXICODONE) half-tab 2.5 mg, 2.5 mg, Oral, Q4H PRN, Domitila Tierney NP, 2.5 mg at 10/04/24 0824    predniSONE (DELTASONE) tablet 5 mg, 5 mg, Oral, Daily, Domitila Tierney NP, 5 mg at 10/05/24 0820    senna-docusate (SENOKOT-S/PERICOLACE) 8.6-50 MG per tablet 1 tablet, 1 tablet, Oral, BID PRN **OR** senna-docusate (SENOKOT-S/PERICOLACE) 8.6-50 MG per tablet 2 tablet, 2 tablet, Oral, BID PRN, Domitila Tierney NP    sodium chloride (PF) 0.9% PF flush 3 mL, 3 mL, Intracatheter, Q8H, Domitila Tierney NP, 3 mL at 10/05/24 0635    sodium chloride (PF) 0.9% PF flush 3 mL, 3 mL, Intracatheter, q1 min prn, Domitila Tierney NP     Allergies   Allergen Reactions    Ace Inhibitors Cough          Lab Results    Chemistry CBC Cardiac Enzymes/BNP/TSH/INR   Recent Labs   Lab Test 10/04/24  0440      POTASSIUM 3.8   CHLORIDE 102   CO2 28   *   BUN 11.2   CR 0.62   GFRESTIMATED 82   MAYRA 8.3*     Recent Labs   Lab Test 10/04/24  0440 10/03/24  1128 09/30/24  0759   CR 0.62 0.61 0.76          Recent Labs   Lab Test 10/04/24  0440   WBC 8.0   HGB 9.0*   HCT 28.3*   MCV 91        Recent Labs   Lab Test 10/04/24  0440 10/03/24  1257 09/30/24  0759   HGB 9.0* 8.7* 10.1*    No results for input(s): \"TROPONINI\" in the last 94013 hours.  No results for input(s): \"BNP\", \"NTBNPI\", \"NTBNP\" in the last 30289 hours.  No results for input(s): \"TSH\" in the last 66865 hours.  No results for input(s): \"INR\" in the last 49989 hours.         Gigi Erwin MD  10/5/2024  8:32 AM    "

## 2024-10-05 NOTE — PROGRESS NOTES
"   10/05/24 1130   Appointment Info   Signing Clinician's Name / Credentials (OT) Brianda Caroline, OTR/L   Living Environment   People in Home alone   Current Living Arrangements independent living facility   Home Accessibility no concerns   Transportation Anticipated family or friend will provide   Self-Care   Equipment Currently Used at Home walker, rolling  (4WW)   Fall history within last six months yes   Number of times patient has fallen within last six months 1   Activity/Exercise/Self-Care Comment Independent with ADLs at baseline.   Instrumental Activities of Daily Living (IADL)   IADL Comments Independent with laundry, has housekeeping services 1x/week, gets 10 meals from facility otherwise independent with cooking.   General Information   Onset of Illness/Injury or Date of Surgery 10/03/24   Referring Physician Calos Ramirez MBBS   Patient/Family Therapy Goal Statement (OT) none stated   Additional Occupational Profile Info/Pertinent History of Current Problem Per chart review, pt \"is a 89 year old female with history of hypertension, JAGDISH, hypercholesterolemia and prior CVA admitted due to chest pain and atrial fibrillation RVR. Patient was discharged home a day PTA from hospital where she found to have colonic mass, likely malignant with 4.2 cm left adnexal cystic lesion and prominent mesenteric lymph node. The patient reports when she awoke in the morning she felt fine, but for 1-2 hours she was having centralized chest pain. Patient noted that she was coughing up phlegm this morning, and that after coughing she felt better.  EKG obtained in the ER patient in atrial fibrillation RVR rate in 130s.\"   Existing Precautions/Restrictions fall   Cognitive Status Examination   Orientation Status orientation to person, place and time  (A&Ox4)   Pain Assessment   Patient Currently in Pain No   Range of Motion Comprehensive   General Range of Motion no range of motion deficits identified   Strength Comprehensive " (MMT)   Comment, General Manual Muscle Testing (MMT) Assessment Slight generalized weakness noted functionally.   Bed Mobility   Bed Mobility supine-sit   Supine-Sit [place] (Bed Mobility) supervision   Assistive Device (Bed Mobility) bed rails   Transfers   Transfers sit-stand transfer;toilet transfer   Sit-Stand Transfer   Sit-Stand [place] (Transfers) supervision   Assistive Device (Sit-Stand Transfers) walker, front-wheeled   Toilet Transfer   Type (Toilet Transfer) sit-stand;stand-sit   [place] Level (Toilet Transfer) contact guard   Assistive Device (Toilet Transfer) grab bars/safety frame;walker, front-wheeled   Balance   Balance Comments SBA/CGA for balance using FWW, no LOB noted.   Activities of Daily Living   BADL Assessment/Intervention lower body dressing   Lower Body Dressing Assessment/Training   [place] Level (Lower Body Dressing) set up;supervision   Position (Lower Body Dressing) edge of bed sitting   Comment, (Lower Body Dressing) extended time to don shoes, increased HR   Grooming Assessment/Training   [place] Level (Grooming) contact guard assist   Comment, (Grooming) increased HR   Position (Grooming) sink side;unsupported standing   Clinical Impression   Criteria for Skilled Therapeutic Interventions Met (OT) Yes, treatment indicated   OT Diagnosis Impaired ability to perform ADLs, IADLs, and functional mobility.   Influenced by the following impairments A fib   OT Problem List-Impairments impacting ADL problems related to;activity tolerance impaired;balance;mobility;strength   Assessment of Occupational Performance 3-5 Performance Deficits   Identified Performance Deficits functional endurance, lower body dressing, toileting, grooming/hygiene   Planned Therapy Interventions (OT) ADL retraining;balance training;strengthening;transfer training;home program guidelines;progressive activity/exercise;risk factor education   Clinical Decision Making Complexity (OT) detailed  assessment/moderate complexity   Risk & Benefits of therapy have been explained evaluation/treatment results reviewed;care plan/treatment goals reviewed;risks/benefits reviewed;current/potential barriers reviewed;participants voiced agreement with care plan;participants included;patient;daughter   OT Total Evaluation Time   OT Eval, Moderate Complexity Minutes (87838) 13   OT Goals   Therapy Frequency (OT) Daily   OT Predicted Duration/Target Date for Goal Attainment 10/12/24   OT Goals Hygiene/Grooming;Lower Body Dressing;Toilet Transfer/Toileting;Aerobic Activity   OT: Hygiene/Grooming modified independent;using adaptive equipment;while standing   OT: Lower Body Dressing Modified independent;using adaptive equipment   OT: Toilet Transfer/Toileting Modified independent;toilet transfer;cleaning and garment management;using adaptive equipment   OT: Perform aerobic activity with stable cardiovascular response intermittent activity;10 minutes   Interventions   Interventions Quick Adds Therapeutic Activity   Therapeutic Activities   Therapeutic Activity Minutes (54101) 10   Symptoms noted during/after treatment significant change in vital signs;fatigue   Treatment Detail/Skilled Intervention Pt ambulated 35' in hallway using FWW, HR noted to increase to 120-135 bpm in A fib immediately with activity, with patient becoming fatigued quickly, pt completed 2x with seated rest break between trials. Educated pt on PLB tech and pacing to address HR control, patient with good carryover throughout session.   OT Discharge Planning   OT Plan monitor HR, progress functional endurance, ambulatory ADLs with EC strategies as needed   OT Discharge Recommendation (DC Rec) Transitional Care Facility   OT Rationale for DC Rec At this time, pt requiring Ax1 with all ADLs and functional mobility and fatiguing very easily, HR elevating with minimal activity. Recommend TCU as safest discharge plan, pending progression. Pt is moving well, may  progress to home discharge pending progress during hospital stay.   OT Brief overview of current status SBA/CGA functional mobility and ADLs, tachy with minimal activity   Total Session Time   Timed Code Treatment Minutes 10   Total Session Time (sum of timed and untimed services) 23

## 2024-10-05 NOTE — PLAN OF CARE
Goal Outcome Evaluation:      Plan of Care Reviewed With: patient, child          Outcome Evaluation: Discharge plan undetermined at this time pending progression. pt and family to make decision on home care vs TCU.      MITALI Frazier  10/5/2024

## 2024-10-05 NOTE — PLAN OF CARE
Problem: Gas Exchange Impaired  Goal: Optimal Gas Exchange  Outcome: Progressing  Intervention: Optimize Oxygenation and Ventilation  Recent Flowsheet Documentation  Taken 10/5/2024 0141 by Grupo Newell RN  Head of Bed (HOB) Positioning: HOB at 20-30 degrees     Problem: Comorbidity Management  Goal: Maintenance of Heart Failure Symptom Control  Outcome: Progressing  Intervention: Maintain Heart Failure Management  Recent Flowsheet Documentation  Taken 10/4/2024 2326 by Grupo Newell, RN  Medication Review/Management: medications reviewed   Goal Outcome Evaluation:    AxOx4. Denies pain and sob. Lungs sound wheezes. Tele is Afib. Pt has CPAP over noc. Call appropriately. Makes needs known. Call lights within reach.

## 2024-10-05 NOTE — PLAN OF CARE
Problem: Comorbidity Management  Goal: Maintenance of Asthma Control  Outcome: Progressing  Goal: Maintenance of Behavioral Health Symptom Control  Outcome: Progressing  Goal: Maintenance of COPD Symptom Control  Outcome: Progressing  Goal: Blood Glucose Levels Within Targeted Range  Outcome: Progressing  Goal: Maintenance of Heart Failure Symptom Control  Outcome: Progressing  Goal: Blood Pressure in Desired Range  Outcome: Progressing  Goal: Maintenance of Osteoarthritis Symptom Control  Outcome: Progressing  Goal: Bariatric Home Regimen Maintained  Outcome: Progressing  Goal: Maintenance of Seizure Control  Outcome: Progressing   Goal Outcome Evaluation:    Pt is alert and oriented x 4.  Cardiac rhythm is a-fib., not rate controlled . PRN Metoprolol given once for HR of 118. Effective short term at decreasing HR to 100.     Pelvic Ultrasound complete this evening. Impression: showed 4 cm left ovarian cyst.     Pt up in room with SBA and walker.

## 2024-10-06 LAB
ANION GAP SERPL CALCULATED.3IONS-SCNC: 9 MMOL/L (ref 7–15)
BUN SERPL-MCNC: 9 MG/DL (ref 8–23)
CALCIUM SERPL-MCNC: 8.4 MG/DL (ref 8.8–10.4)
CHLORIDE SERPL-SCNC: 102 MMOL/L (ref 98–107)
CREAT SERPL-MCNC: 0.67 MG/DL (ref 0.51–0.95)
EGFRCR SERPLBLD CKD-EPI 2021: 80 ML/MIN/1.73M2
ERYTHROCYTE [DISTWIDTH] IN BLOOD BY AUTOMATED COUNT: 14.1 % (ref 10–15)
GLUCOSE SERPL-MCNC: 98 MG/DL (ref 70–99)
HCO3 SERPL-SCNC: 27 MMOL/L (ref 22–29)
HCT VFR BLD AUTO: 28 % (ref 35–47)
HGB BLD-MCNC: 8.7 G/DL (ref 11.7–15.7)
MCH RBC QN AUTO: 28.3 PG (ref 26.5–33)
MCHC RBC AUTO-ENTMCNC: 31.1 G/DL (ref 31.5–36.5)
MCV RBC AUTO: 91 FL (ref 78–100)
PLATELET # BLD AUTO: 350 10E3/UL (ref 150–450)
POTASSIUM SERPL-SCNC: 3.3 MMOL/L (ref 3.4–5.3)
POTASSIUM SERPL-SCNC: 4.5 MMOL/L (ref 3.4–5.3)
RBC # BLD AUTO: 3.07 10E6/UL (ref 3.8–5.2)
SODIUM SERPL-SCNC: 138 MMOL/L (ref 135–145)
WBC # BLD AUTO: 7.8 10E3/UL (ref 4–11)

## 2024-10-06 PROCEDURE — 36415 COLL VENOUS BLD VENIPUNCTURE: CPT | Performed by: INTERNAL MEDICINE

## 2024-10-06 PROCEDURE — 250N000013 HC RX MED GY IP 250 OP 250 PS 637: Performed by: INTERNAL MEDICINE

## 2024-10-06 PROCEDURE — 210N000001 HC R&B IMCU HEART CARE

## 2024-10-06 PROCEDURE — 99232 SBSQ HOSP IP/OBS MODERATE 35: CPT | Performed by: INTERNAL MEDICINE

## 2024-10-06 PROCEDURE — 84132 ASSAY OF SERUM POTASSIUM: CPT | Performed by: INTERNAL MEDICINE

## 2024-10-06 PROCEDURE — 250N000012 HC RX MED GY IP 250 OP 636 PS 637

## 2024-10-06 PROCEDURE — 250N000013 HC RX MED GY IP 250 OP 250 PS 637

## 2024-10-06 PROCEDURE — 85027 COMPLETE CBC AUTOMATED: CPT | Performed by: INTERNAL MEDICINE

## 2024-10-06 PROCEDURE — 80048 BASIC METABOLIC PNL TOTAL CA: CPT | Performed by: INTERNAL MEDICINE

## 2024-10-06 PROCEDURE — 258N000003 HC RX IP 258 OP 636: Performed by: INTERNAL MEDICINE

## 2024-10-06 PROCEDURE — 250N000011 HC RX IP 250 OP 636: Performed by: INTERNAL MEDICINE

## 2024-10-06 RX ORDER — POTASSIUM CHLORIDE 1500 MG/1
40 TABLET, EXTENDED RELEASE ORAL ONCE
Status: COMPLETED | OUTPATIENT
Start: 2024-10-06 | End: 2024-10-06

## 2024-10-06 RX ADMIN — LOSARTAN POTASSIUM AND HYDROCHLOROTHIAZIDE 1 TABLET: 25; 100 TABLET ORAL at 08:31

## 2024-10-06 RX ADMIN — FAMOTIDINE 20 MG: 20 TABLET ORAL at 20:18

## 2024-10-06 RX ADMIN — CLOPIDOGREL BISULFATE 75 MG: 75 TABLET ORAL at 08:32

## 2024-10-06 RX ADMIN — DOCUSATE SODIUM 100 MG: 100 CAPSULE, LIQUID FILLED ORAL at 08:31

## 2024-10-06 RX ADMIN — ACETAMINOPHEN 650 MG: 325 TABLET ORAL at 20:18

## 2024-10-06 RX ADMIN — POTASSIUM CHLORIDE 40 MEQ: 1500 TABLET, EXTENDED RELEASE ORAL at 10:41

## 2024-10-06 RX ADMIN — METOPROLOL TARTRATE 100 MG: 25 TABLET, FILM COATED ORAL at 08:31

## 2024-10-06 RX ADMIN — ACETAMINOPHEN 650 MG: 325 TABLET ORAL at 12:44

## 2024-10-06 RX ADMIN — IRON SUCROSE 200 MG: 20 INJECTION, SOLUTION INTRAVENOUS at 12:41

## 2024-10-06 RX ADMIN — METOPROLOL TARTRATE 100 MG: 25 TABLET, FILM COATED ORAL at 20:18

## 2024-10-06 RX ADMIN — PREDNISONE 5 MG: 5 TABLET ORAL at 08:31

## 2024-10-06 ASSESSMENT — ACTIVITIES OF DAILY LIVING (ADL)
ADLS_ACUITY_SCORE: 42
ADLS_ACUITY_SCORE: 41
ADLS_ACUITY_SCORE: 43
ADLS_ACUITY_SCORE: 42
ADLS_ACUITY_SCORE: 42
ADLS_ACUITY_SCORE: 43
ADLS_ACUITY_SCORE: 41
ADLS_ACUITY_SCORE: 41
ADLS_ACUITY_SCORE: 43
ADLS_ACUITY_SCORE: 43
ADLS_ACUITY_SCORE: 41
ADLS_ACUITY_SCORE: 42
ADLS_ACUITY_SCORE: 41
ADLS_ACUITY_SCORE: 43

## 2024-10-06 NOTE — PROGRESS NOTES
[facility]    Medicine Progress Note - Hospitalist Service    Date of Admission:  10/3/2024    Assessment & Plan   Susan Fair is a 89 year old female with history of hypertension, JAGDISH, hypercholesterolemia and prior CVA admitted due to chest pain and atrial fibrillation RVR. Patient was discharged home a day PTA from hospital where she found to have colonic mass, likely malignant with 4.2 cm left adnexal cystic lesion and prominent mesenteric lymph node. The patient reports when she awoke in the morning she felt fine, but for 1-2 hours she was having centralized chest pain. Patient noted that she was coughing up phlegm this morning, and that after coughing she felt better.  EKG obtained in the ER patient in atrial fibrillation RVR rate in 130s.  Patient given 3 doses of IV metoprolol slowed heart rate down to 100s.       # Chest pain  # Atrial fibrillation RVR  -EKG obtained and reviewed. Rate 130-120  -IV metoprolol 5 mg x 3 given in ED  -Metoprolol oral started. Dose increased 10/04, increased further to 100mg BID 10/05  -Hold amlodipine  -Metoprolol IV 5 mg as needed for heart rate above 110  -Troponin 19-->18  -Cardiac telemetry monitor  -Cardiology consulted. Elevated CHADS-Vasc score noted and discussed with patient/family. Given her age, tendency for fall. Anemia, colon cancer, patient is deemed not a good candidate for AC.     #Colonic mass-possibly malignant  Abdominal pain, improved  -CT scan of the abdomen and pelvis showed irregular masslike wall thickening of the hepatic flexure with adjacent inflammatory changes, suspicious for primary colonic neoplasm for which -Colonoscopy completed, with biopsy  -Biopsy report discussed with the patient/family. It showed INVASIVE WELL TO MODERATELY DIFFERENTIATED ADENOCARCINOMA WITH FOCAL MUCINOUS DIFFERENTIATION   -Patient/family discussed the treatment options with Dr Gomez from [facility] 10/04. Options discussed were right hemicolectomy vs  immunotherapy vs hospice type of care. Dr Gomez also recommended getting oncologist's input. Oncology consulted. Patient can follow up with Dr Gomez in the clinic on 10/10/24 to discuss the treatment plan further  -Patient/family seems inclined towards immunotherapy; however if they choose surgery, will need cardiac clearance per [facility].     # Adnexal mass, left  -Patient/family request further work up with pelvis ultrasound as recommended by radiologist. Pelvis ultrasound done on 10/04; findings were consistent with ovarian cyst. Recommendation is to repeat pelvis ultrasound in 6 months    # Anemia, chronic   #Melena  -Hemoglobin is more or less stable  -Patient reports black stools for quite some time. Unclear if its due to iron pills or low grade oozing from the malignancy. Hold iron pills. Will give IV iron while here. Continue to monitor hgb and stool color    #Hypertension  -Monitor blood pressure, stable at this time  -Hold PTA amlodipine  -Resume PTA losartan-hydrochlorothiazide for now    #Concern for PMR  Patient was taking prednisone PTA for possible PMR.  Patient was evaluated by rheumatologist who felt diagnosis of PMR is unlikely and recommends tapering off prednisone.  Continue PTA prednisone 5 mg daily with plans to taper to 2.5 mg daily in the next 1 week      # Hyponatremia  Sodium improved  Trend BMP          Diet: Combination Diet Regular Diet Adult    DVT Prophylaxis: Pneumatic Compression Devices  Golden Catheter: Not present  Lines: None     Cardiac Monitoring: ACTIVE order. Indication: Tachyarrhythmias, acute (48 hours)  Code Status: Full Code      Clinically Significant Risk Factors        # Hypokalemia: Lowest K = 3.3 mmol/L in last 2 days, will replace as needed           # Hypertension: Noted on problem list             # Obesity: Estimated body mass index is 34.76 kg/m  as calculated from the following:    Height as of this encounter: 1.524 m (5').    Weight as of this encounter: 80.7 kg  (178 lb)., PRESENT ON ADMISSION     # Financial/Environmental Concerns: none         Disposition Plan     Medically Ready for Discharge: TCU recommended, but the patient/family wants to take her home with [facility]             SALOMON Guthrie  Hospitalist Service  [facility]  Securely message with [employer] (more info)  Text page via [employer] Paging/Directory   ______________________________________________________________________    Interval History   Patient seen and examined this morning. Daughters were at bedside. Patient stated doing about the same. Had one bowel movement last evening, and it was not as black as before. Reports mild intermittent abdominal pain. No chest pain or shortness of breath.     Physical Exam   Vital Signs: Temp: 99.2  F (37.3  C) Temp src: Oral BP: 136/78 Pulse: (!) 122   Resp: 18 SpO2: 95 % O2 Device: None (Room air)    Weight: 178 lbs 0 oz      General: Not in obvious distress.  HEENT: NC, AT   Chest: Clear to auscultation bilaterally  Heart: S1S2 normal, regular. No M/R/G  Abdomen: Soft. NT, ND. Bowel sounds- active.  Extremities: No legs swelling  Neuro: Awake, grossly non-focal      Medical Decision Making             Data     I have personally reviewed the following data over the past 24 hrs:    7.8  \   8.7 (L)   / 350     138 102 9.0 /  98   4.5 27 0.67 \       Imaging results reviewed over the past 24 hrs:   No results found for this or any previous visit (from the past 24 hour(s)).

## 2024-10-06 NOTE — PROGRESS NOTES
Care Management Follow Up    Length of Stay (days): 2    Expected Discharge Date: 10/07/2024     Concerns to be Addressed:       Patient plan of care discussed at interdisciplinary rounds: Yes    Anticipated Discharge Disposition:     TCU referrals sent           Anticipated Discharge Services:    Anticipated Discharge DME:      Patient/family educated on Medicare website which has current facility and service quality ratings:    Education Provided on the Discharge Plan:    Patient/Family in Agreement with the Plan:      Referrals Placed by CM/SW:    Private pay costs discussed: Not applicable    Discussed  Partnership in Safe Discharge Planning  document with patient/family: No     Handoff Completed: No, handoff not indicated or clinically appropriate    Additional Information:  5 TCU referrals placed per family's recommendation    Next Steps: Await respond from Tcu referrals- Cm to follow up with family on their choice of transportation- they were concerned about her mobility if she'd be able to get in and out of their vehicle    Yesenia Dallas RN

## 2024-10-06 NOTE — PROGRESS NOTES
[facility]    Medicine Progress Note - Hospitalist Service    Date of Admission:  10/3/2024    Assessment & Plan   Susan Fair is a 89 year old female with history of hypertension, JAGDISH, hypercholesterolemia and prior CVA admitted due to chest pain and atrial fibrillation RVR. Patient was discharged home a day PTA from hospital where she found to have colonic mass, likely malignant with 4.2 cm left adnexal cystic lesion and prominent mesenteric lymph node. The patient reports when she awoke in the morning she felt fine, but for 1-2 hours she was having centralized chest pain. Patient noted that she was coughing up phlegm this morning, and that after coughing she felt better.  EKG obtained in the ER patient in atrial fibrillation RVR rate in 130s.  Patient given 3 doses of IV metoprolol slowed heart rate down to 100s.       # Chest pain  # Atrial fibrillation RVR  -EKG obtained and reviewed. Rate 130-120  -IV metoprolol 5 mg x 3 given in ED  -Metoprolol oral started. Dose increased 10/04, increased further to 100mg BID 10/05. Dilt 60mg TID started 10/07. Hold Hyzaar to make room for additional rate control medications if needed  -Metoprolol IV 5 mg as needed for heart rate above 110  -Troponin 19-->18  -Cardiac telemetry monitor  -Cardiology consulted. Elevated CHADS-Vasc score noted and discussed with patient/family. Given her age, tendency for fall. Anemia, colon cancer, patient is deemed not a good candidate for AC.     #Colonic mass-possibly malignant  Abdominal pain, improved  -CT scan of the abdomen and pelvis showed irregular masslike wall thickening of the hepatic flexure with adjacent inflammatory changes, suspicious for primary colonic neoplasm for which -Colonoscopy completed, with biopsy  -Biopsy report discussed with the patient/family. It showed INVASIVE WELL TO MODERATELY DIFFERENTIATED ADENOCARCINOMA WITH FOCAL MUCINOUS DIFFERENTIATION   -Patient/family discussed the treatment  options with Dr Gomez from [facility] 10/04. Options discussed were right hemicolectomy vs immunotherapy vs hospice type of care. Dr Gomez also recommended getting oncologist's input. Oncology consulted. Patient can follow up with Dr Gomez in the clinic on 10/10/24 to discuss the treatment plan further  -Patient/family seems inclined towards immunotherapy; however if they choose surgery, will need cardiac clearance per [facility].     # Adnexal mass, left  -Patient/family request further work up with pelvis ultrasound as recommended by radiologist. Pelvis ultrasound done on 10/04; findings were consistent with ovarian cyst. Recommendation is to repeat pelvis ultrasound in 6 months    # Anemia, chronic   #Melena  -Hemoglobin is more or less stable  -Patient reports black stools for quite some time. Unclear if its due to iron pills or low grade oozing from the malignancy. Keep holding iron pills. Given IV iron 200mg x 3. Continue to monitor hgb and stool color    #Hypertension  -Monitor blood pressure, stable at this time  -Hold PTA amlodipine. Hold PTA losartan-hydrochlorothiazide for now    #Concern for PMR  Patient was taking prednisone PTA for possible PMR.  Patient was evaluated by rheumatologist who felt diagnosis of PMR is unlikely and recommends tapering off prednisone.  Continue PTA prednisone 5 mg daily with plans to taper to 2.5 mg daily in the next 1 week      # Hyponatremia  Sodium improved  Trend BMP          Diet: Combination Diet Regular Diet Adult    DVT Prophylaxis: Pneumatic Compression Devices  Golden Catheter: Not present  Lines: None     Cardiac Monitoring: ACTIVE order. Indication: Tachyarrhythmias, acute (48 hours)  Code Status: Full Code      Clinically Significant Risk Factors        # Hypokalemia: Lowest K = 3.3 mmol/L in last 2 days, will replace as needed           # Hypertension: Noted on problem list             # Obesity: Estimated body mass index is 34.76 kg/m  as calculated from the following:     Height as of this encounter: 1.524 m (5').    Weight as of this encounter: 80.7 kg (178 lb)., PRESENT ON ADMISSION     # Financial/Environmental Concerns: none         Disposition Plan     Medically Ready for Discharge: Anticipated Tomorrow             SALOMON Guthrie  Hospitalist Service  [facility]  Securely message with [employer] (more info)  Text page via [employer]   ______________________________________________________________________    Interval History   Patient seen and examined this morning. Patient reports she had severe pain this morning; asked for pain med, states she didn't get for almost half an hour. Pain is ok now after getting the pain meds.     Both daughters were in the room during my visits, talked to them few times today. They now want the patient to go to TCU, discussed this with care management. Patient/family had more questions on PET/CT and immunotherapy. I informed Dr Peter about it. Dr Peter will visit the patient/family today.     Physical Exam   Vital Signs: Temp: 97.7  F (36.5  C) Temp src: Oral BP: 131/76 Pulse: (!) 122   Resp: 18 SpO2: 96 % O2 Device: None (Room air)    Weight: 178 lbs 0 oz      General: Not in obvious distress.  HEENT: NC, AT   Chest: Clear to auscultation bilaterally  Heart: S1S2 normal, regular. No M/R/G  Abdomen: Soft. NT, ND. Bowel sounds- active.  Extremities: No legs swelling  Neuro: Awake, grossly non-focal      Medical Decision Making             Data     I have personally reviewed the following data over the past 24 hrs:    7.8  \   8.7 (L)   / 350     138 102 9.0 /  98   3.3 (L) 27 0.67 \       Imaging results reviewed over the past 24 hrs:   No results found for this or any previous visit (from the past 24 hour(s)).

## 2024-10-06 NOTE — PLAN OF CARE
Problem: Comorbidity Management  Goal: Maintenance of Asthma Control  Outcome: Progressing  Intervention: Maintain Asthma Symptom Control  Recent Flowsheet Documentation  Taken 10/5/2024 2047 by Yanci Hess RN  Medication Review/Management: medications reviewed  Taken 10/5/2024 1612 by Yanci Hess RN  Medication Review/Management: medications reviewed  Goal: Maintenance of Behavioral Health Symptom Control  Outcome: Progressing  Intervention: Maintain Behavioral Health Symptom Control  Recent Flowsheet Documentation  Taken 10/5/2024 2047 by Yanci Hess RN  Medication Review/Management: medications reviewed  Taken 10/5/2024 1612 by Yanci Hess RN  Medication Review/Management: medications reviewed  Goal: Maintenance of COPD Symptom Control  Outcome: Progressing  Intervention: Maintain COPD Symptom Control  Recent Flowsheet Documentation  Taken 10/5/2024 2047 by Yanci Hess RN  Supportive Measures:   active listening utilized   self-care encouraged   decision-making supported  Medication Review/Management: medications reviewed  Taken 10/5/2024 1612 by Yanci Hess RN  Supportive Measures:   active listening utilized   self-care encouraged   decision-making supported  Medication Review/Management: medications reviewed  Goal: Blood Glucose Levels Within Targeted Range  Outcome: Progressing  Intervention: Monitor and Manage Glycemia  Recent Flowsheet Documentation  Taken 10/5/2024 2047 by Yanci Hess RN  Medication Review/Management: medications reviewed  Taken 10/5/2024 1612 by Yanci Hess RN  Medication Review/Management: medications reviewed  Goal: Maintenance of Heart Failure Symptom Control  Outcome: Progressing  Intervention: Maintain Heart Failure Management  Recent Flowsheet Documentation  Taken 10/5/2024 2047 by Yanci Hess, RN  Medication Review/Management: medications reviewed  Taken 10/5/2024 1612 by Yanci Hess, RN  Medication  Review/Management: medications reviewed  Goal: Blood Pressure in Desired Range  Outcome: Progressing  Intervention: Maintain Blood Pressure Management  Recent Flowsheet Documentation  Taken 10/5/2024 2047 by Yanci Hess RN  Medication Review/Management: medications reviewed  Taken 10/5/2024 1612 by Yanci Hess RN  Medication Review/Management: medications reviewed  Goal: Maintenance of Osteoarthritis Symptom Control  Outcome: Progressing  Intervention: Maintain Osteoarthritis Symptom Control  Recent Flowsheet Documentation  Taken 10/5/2024 2047 by Yanci Hess RN  Assistive Device Utilized:   walker   gait belt  Activity Management:   activity encouraged   ambulated in room  Medication Review/Management: medications reviewed  Taken 10/5/2024 1756 by Yanci Hess, RN  Activity Management:   ambulated to bathroom   up in chair  Taken 10/5/2024 1612 by Yanci Hess RN  Assistive Device Utilized:   walker   gait belt  Activity Management:   activity encouraged   ambulated in room  Medication Review/Management: medications reviewed  Goal: Bariatric Home Regimen Maintained  Outcome: Progressing  Intervention: Maintain and Manage Postbariatric Surgery Care  Recent Flowsheet Documentation  Taken 10/5/2024 2047 by Yanci Hess RN  Medication Review/Management: medications reviewed  Taken 10/5/2024 1612 by Yanci Hess RN  Medication Review/Management: medications reviewed  Goal: Maintenance of Seizure Control  Outcome: Progressing  Intervention: Maintain Seizure Symptom Control  Recent Flowsheet Documentation  Taken 10/5/2024 2047 by Yanci Hess RN  Sensory Stimulation Regulation: care clustered  Medication Review/Management: medications reviewed  Taken 10/5/2024 1612 by Yanci Hess RN  Sensory Stimulation Regulation: care clustered  Medication Review/Management: medications reviewed   Goal Outcome Evaluation:    Pt is a/o, but forgetful.   VSS  Cardiac rhythm is  A-fib. Using Metoprolol to manage HR. Uncontrolled HR ranging 100-120. When pt gets excited or stands to ambulate HR jumps to 140's-150's and decreases again at rest to 110 bpm.       Pt is up with stand by assist, gait belt and walker.

## 2024-10-06 NOTE — PLAN OF CARE
- Alert and orientedx4. Forgetful at times with the time. Vital signs stable. Afib with HR of 90's tonight. CPAP at night. 02 Sats >95%. Denies of pain. Slept most of the shift. Will monitor closely.    Problem: Gas Exchange Impaired  Goal: Optimal Gas Exchange  10/6/2024 0627 by Daniel Kruger RN  Outcome: Progressing    Problem: Risk for Delirium  Goal: Optimal Coping  Intervention: Optimize Psychosocial Adjustment to Delirium  Goal: Improved Behavioral Control  Intervention: Prevent and Manage Agitation  Intervention: Minimize Safety Risk  Goal: Improved Attention and Thought Clarity  Intervention: Maximize Cognitive Function  Goal: Improved Sleep  Outcome: Progressing     Problem: Dysrhythmia  Goal: Normalized Cardiac Rhythm  10/6/2024 0627 by Daniel Kruger, RN  Outcome: Progressing    Problem: Sleep Disturbance  Goal: Adequate Sleep/Rest  Outcome: Progressing

## 2024-10-06 NOTE — PROGRESS NOTES
[facility]  Cardiac Electrophysiology  [address]  [place] [address]   Office: [phone]  Fax: [phone]     Cardiology Progress Note    Patient: Susan Fair   : 10/11/1928       Assessment/Recommendations     New onset atrial fibrillation - onset unknown  [id number], HAS-BLED 6 with anemia, colon cancer  - high stroke risk, though poor candidate for oral anticoagulation with anemia, colon adenocarcinoma, advanced age - encouraged further consideration for NOAC initiation - this was reviewed further today with Mrs. Fair and daughter at bedside, and can be considered further in light of upcoming discussion with oncology regarding colon cancer management plan  - continue metoprolol 100mg twice daily  - if heart rate remained controlled, possible discharge tomorrow  - cardiology follow-up visit 3-4 weeks requested - aim for ambulatory heart rhythm monitoring to assess rate control     CVAs/TIAs  - continue clopidogrel         Interval Events   She feels well.  Telemetry shows AF, resting ventricular rates 80-90s overnight, 90-100s during daytime hours, 120-130s with activity.  She is with her daughter at bedside.       Physical Examination  Review of Systems   VITALS: /76 (BP Location: Left arm)   Pulse (!) 122   Temp 97.7  F (36.5  C) (Oral)   Resp 18   Ht 1.524 m (5')   Wt 80.7 kg (178 lb)   SpO2 96%   BMI 34.76 kg/m    Wt Readings from Last 3 Encounters:   10/06/24 80.7 kg (178 lb)   10/01/24 82.1 kg (181 lb)       Intake/Output Summary (Last 24 hours) at 10/6/2024 0802  Last data filed at 10/6/2024 0714  Gross per 24 hour   Intake 1110 ml   Output 500 ml   Net 610 ml     CONSTITUTIONAL: no distress  EYES:  Conjunctivae pink, sclerae clear.    E/N/T:  Oral mucosa pink, moist mucous membranes  RESPIRATORY:  Respiratory effort is normal  CARDIOVASCULAR:  irregular, normal S1 and S2  GASTROINTESTINAL:  Abdomen without masses or  tenderness  EXTREMITIES:  No clubbing or cyanosis.    MUSCULOSKELETAL:  Overall grossly normal muscle strength  SKIN:  Overall, skin warm and dry, no lesions.  NEURO/PSYCH:  Oriented x 3 with normal affect.  Constitutional:  No weight loss or loss of appetite    Cardiovascular: As detailed above  Respiratory: No cough  Genitourinary: No trouble urinating           Medical History  Surgical History   Past Medical History:   Diagnosis Date    Cerebral infarction (H)     Sleep apnea     Past Surgical History:   Procedure Laterality Date    COLONOSCOPY      COLONOSCOPY N/A 10/1/2024    Procedure: COLONOSCOPY;  Surgeon: Alejandro Aguiar MD;  Location: [facility] OR    ORTHOPEDIC SURGERY           Family History Social History   History reviewed. No pertinent family history.     Social History     Tobacco Use    Smoking status: Never    Smokeless tobacco: Never   Substance Use Topics    Alcohol use: Not Currently    Drug use: Never         Medications  Allergies     Current Facility-Administered Medications:     acetaminophen (TYLENOL) tablet 650 mg, 650 mg, Oral, At Bedtime, Umair Jung MD, 650 mg at 10/05/24 2105    acetaminophen (TYLENOL) tablet 650 mg, 650 mg, Oral, Q6H PRN, Umair Jung MD, 650 mg at 10/04/24 1542    calcium carbonate (TUMS) chewable tablet 1,000 mg, 1,000 mg, Oral, 4x Daily PRN, Domitila Tierney NP    clopidogrel (PLAVIX) tablet 75 mg, 75 mg, Oral, Daily, Domitila Tierney NP, 75 mg at 10/05/24 0819    docusate sodium (COLACE) capsule 100 mg, 100 mg, Oral, Daily, Domitila Tierney NP, 100 mg at 10/05/24 0821    famotidine (PEPCID) tablet 20 mg, 20 mg, Oral, At Bedtime, Domitila Tierney NP, 20 mg at 10/05/24 2105    [Held by provider] ferrous sulfate (FEROSUL) tablet 325 mg, 325 mg, Oral, Daily, Domitila Tierney NP, 325 mg at 10/04/24 0815    lidocaine (LMX4) cream, , Topical, Q1H PRN, Domitila Tierney NP    lidocaine 1 % 0.1-1 mL, 0.1-1 mL, Other, Q1H PRN, Domitila Tierney NP     losartan-hydrochlorothiazide (HYZAAR) 100-25 MG per tablet 1 tablet, 1 tablet, Oral, Daily, Umair Jung MD, 1 tablet at 10/05/24 0820    melatonin tablet 5 mg, 5 mg, Oral, At Bedtime PRN, Efren Cardona MD, 5 mg at 10/03/24 2346    metoprolol tartrate (LOPRESSOR) tablet 100 mg, 100 mg, Oral, BID, Gigi Erwin MD, 100 mg at 10/05/24 2104    naloxone (NARCAN) injection 0.2 mg, 0.2 mg, Intravenous, Q2 Min PRN **OR** naloxone (NARCAN) injection 0.4 mg, 0.4 mg, Intravenous, Q2 Min PRN **OR** naloxone (NARCAN) injection 0.2 mg, 0.2 mg, Intramuscular, Q2 Min PRN **OR** naloxone (NARCAN) injection 0.4 mg, 0.4 mg, Intramuscular, Q2 Min PRN, Domitila Tierney NP    ondansetron (ZOFRAN ODT) ODT tab 4 mg, 4 mg, Oral, Q12H PRN, Domitila Tierney, FISH    oxyCODONE (ROXICODONE) tablet 5 mg, 5 mg, Oral, Q4H PRN, Umair Jung MD    oxyCODONE IR (ROXICODONE) half-tab 2.5 mg, 2.5 mg, Oral, Q4H PRN, Domitila Tierney NP, 2.5 mg at 10/04/24 0824    predniSONE (DELTASONE) tablet 5 mg, 5 mg, Oral, Daily, Domitila Tierney NP, 5 mg at 10/05/24 0820    senna-docusate (SENOKOT-S/PERICOLACE) 8.6-50 MG per tablet 1 tablet, 1 tablet, Oral, BID PRN **OR** senna-docusate (SENOKOT-S/PERICOLACE) 8.6-50 MG per tablet 2 tablet, 2 tablet, Oral, BID PRN, Domitila Tierney, NP    sodium chloride (PF) 0.9% PF flush 3 mL, 3 mL, Intracatheter, Q8H, Domitila Tierney, NP, 3 mL at 10/06/24 0611    sodium chloride (PF) 0.9% PF flush 3 mL, 3 mL, Intracatheter, q1 min prn, Domitila Tierney, NP     Allergies   Allergen Reactions    Ace Inhibitors Cough          Lab Results    Chemistry CBC Cardiac Enzymes/BNP/TSH/INR   Recent Labs   Lab Test 10/04/24  0440      POTASSIUM 3.8   CHLORIDE 102   CO2 28   *   BUN 11.2   CR 0.62   GFRESTIMATED 82   MAYRA 8.3*     Recent Labs   Lab Test 10/04/24  0440 10/03/24  1128 09/30/24  0759   CR 0.62 0.61 0.76          Recent Labs   Lab Test 10/05/24  1002 10/04/24  0440   WBC  --  8.0   HGB 10.3* 9.0*   HCT  --  28.3*   MCV   "--  91   PLT  --  334     Recent Labs   Lab Test 10/05/24  1002 10/04/24  0440 10/03/24  1257   HGB 10.3* 9.0* 8.7*    No results for input(s): \"TROPONINI\" in the last 17141 hours.  No results for input(s): \"BNP\", \"NTBNPI\", \"NTBNP\" in the last 30579 hours.  No results for input(s): \"TSH\" in the last 78898 hours.  No results for input(s): \"INR\" in the last 47383 hours.         Gigi Erwin MD  10/6/2024  8:02 AM    "

## 2024-10-06 NOTE — PLAN OF CARE
"  Problem: Adult Inpatient Plan of Care  Goal: Plan of Care Review  Description: The Plan of Care Review/Shift note should be completed every shift.  The Outcome Evaluation is a brief statement about your assessment that the patient is improving, declining, or no change.  This information will be displayed automatically on your shift  note.  Outcome: Progressing  Goal: Patient-Specific Goal (Individualized)  Description: You can add care plan individualizations to a care plan. Examples of Individualization might be:  \"Parent requests to be called daily at 9am for status\", \"I have a hard time hearing out of my right ear\", or \"Do not touch me to wake me up as it startles  me\".  Outcome: Progressing  Goal: Absence of Hospital-Acquired Illness or Injury  Outcome: Progressing  Intervention: Identify and Manage Fall Risk  Recent Flowsheet Documentation  Taken 10/6/2024 1245 by Maricarmen Blanco RN  Safety Promotion/Fall Prevention: activity supervised  Taken 10/6/2024 0830 by Maricarmen Blanco RN  Safety Promotion/Fall Prevention: activity supervised  Intervention: Prevent Skin Injury  Recent Flowsheet Documentation  Taken 10/6/2024 1245 by Maricarmen Blanco RN  Skin Protection: adhesive use limited  Device Skin Pressure Protection: adhesive use limited  Taken 10/6/2024 0830 by Maricarmen Blanco RN  Skin Protection: adhesive use limited  Device Skin Pressure Protection: adhesive use limited  Intervention: Prevent and Manage VTE (Venous Thromboembolism) Risk  Recent Flowsheet Documentation  Taken 10/6/2024 1245 by Maricarmen Blanco RN  VTE Prevention/Management: SCDs off (sequential compression devices)  Taken 10/6/2024 0830 by Maricarmen Blanco RN  VTE Prevention/Management: SCDs off (sequential compression devices)  Intervention: Prevent Infection  Recent Flowsheet Documentation  Taken 10/6/2024 1245 by Maricarmen Blanco RN  Infection Prevention: cohorting utilized  Taken 10/6/2024 0830 by Francis" Maricarmen MURO RN  Infection Prevention: cohorting utilized  Goal: Optimal Comfort and Wellbeing  Outcome: Progressing  Goal: Readiness for Transition of Care  Outcome: Progressing     Problem: Skin Injury Risk Increased  Goal: Skin Health and Integrity  Outcome: Progressing  Intervention: Plan: Nurse Driven Intervention: Moisture Management  Recent Flowsheet Documentation  Taken 10/6/2024 1245 by Maricarmen Blanco RN  Moisture Interventions: Encourage regular toileting  Plan: Moisture Management: encourage regular toileting  Taken 10/6/2024 0830 by Maricarmen Blanco RN  Moisture Interventions: Encourage regular toileting  Plan: Moisture Management: encourage regular toileting  Intervention: Plan: Nurse Driven Intervention: Friction and Shear  Recent Flowsheet Documentation  Taken 10/6/2024 1245 by Maricarmen Blanco RN  Friction/Shear Interventions: HOB 30 degrees or less  Taken 10/6/2024 0830 by Maricarmen Blanco RN  Friction/Shear Interventions: HOB 30 degrees or less  Intervention: Optimize Skin Protection  Recent Flowsheet Documentation  Taken 10/6/2024 1245 by Maricarmen Blanco RN  Pressure Reduction Techniques: heels elevated off bed  Pressure Reduction Devices: feet on footrest/footstool  Skin Protection: adhesive use limited  Activity Management: activity adjusted per tolerance  Taken 10/6/2024 0830 by Maricarmen Blanco RN  Pressure Reduction Techniques: heels elevated off bed  Pressure Reduction Devices: feet on footrest/footstool  Skin Protection: adhesive use limited  Activity Management: activity adjusted per tolerance     Problem: Chest Pain  Goal: Resolution of Chest Pain Symptoms  Outcome: Progressing     Problem: Gas Exchange Impaired  Goal: Optimal Gas Exchange  Outcome: Progressing     Problem: Risk for Delirium  Goal: Optimal Coping  Outcome: Progressing  Intervention: Optimize Psychosocial Adjustment to Delirium  Recent Flowsheet Documentation  Taken 10/6/2024 1245 by Francis  Maricarmen MURO RN  Supportive Measures: active listening utilized  Taken 10/6/2024 0830 by Maricarmen Blanco RN  Supportive Measures: active listening utilized  Goal: Improved Behavioral Control  Outcome: Progressing  Intervention: Prevent and Manage Agitation  Recent Flowsheet Documentation  Taken 10/6/2024 1245 by Maricarmen Blanco RN  Environment Familiarity/Consistency: daily routine followed  Taken 10/6/2024 0830 by Maricarmen Blanco RN  Environment Familiarity/Consistency: daily routine followed  Intervention: Minimize Safety Risk  Recent Flowsheet Documentation  Taken 10/6/2024 1245 by Maricarmen Blanco RN  Communication Enhancement Strategies: call light answered in person  Enhanced Safety Measures: pain management  Taken 10/6/2024 0830 by Maricarmen Blanco RN  Communication Enhancement Strategies: call light answered in person  Enhanced Safety Measures: pain management  Goal: Improved Attention and Thought Clarity  Outcome: Progressing  Intervention: Maximize Cognitive Function  Recent Flowsheet Documentation  Taken 10/6/2024 1245 by Maricarmen Blanco RN  Sensory Stimulation Regulation: care clustered  Reorientation Measures: clock in view  Taken 10/6/2024 0830 by Maricarmen Blanco RN  Sensory Stimulation Regulation: care clustered  Reorientation Measures: clock in view  Goal: Improved Sleep  Outcome: Progressing  Intervention: Promote Sleep  Recent Flowsheet Documentation  Taken 10/6/2024 1245 by Maricarmen Blanco RN  Sleep/Rest Enhancement: consistent schedule promoted  Taken 10/6/2024 0830 by Maricarmen Blanco RN  Sleep/Rest Enhancement: consistent schedule promoted     Problem: Comorbidity Management  Goal: Maintenance of Asthma Control  Outcome: Progressing  Intervention: Maintain Asthma Symptom Control  Recent Flowsheet Documentation  Taken 10/6/2024 1245 by Maricarmen Blanco RN  Medication Review/Management: medications reviewed  Taken 10/6/2024 0830 by Maricarmen Blanco  BHASKAR RN  Medication Review/Management: medications reviewed  Goal: Maintenance of Behavioral Health Symptom Control  Outcome: Progressing  Intervention: Maintain Behavioral Health Symptom Control  Recent Flowsheet Documentation  Taken 10/6/2024 1245 by Maricarmen Blanco RN  Medication Review/Management: medications reviewed  Taken 10/6/2024 0830 by Maricarmen Blanco RN  Medication Review/Management: medications reviewed  Goal: Maintenance of COPD Symptom Control  Outcome: Progressing  Intervention: Maintain COPD Symptom Control  Recent Flowsheet Documentation  Taken 10/6/2024 1245 by Maricarmen Blanco RN  Supportive Measures: active listening utilized  Medication Review/Management: medications reviewed  Taken 10/6/2024 0830 by Maricarmen Blanco RN  Supportive Measures: active listening utilized  Medication Review/Management: medications reviewed  Goal: Blood Glucose Levels Within Targeted Range  Outcome: Progressing  Intervention: Monitor and Manage Glycemia  Recent Flowsheet Documentation  Taken 10/6/2024 1245 by Maricarmen Blanco RN  Medication Review/Management: medications reviewed  Taken 10/6/2024 0830 by Maricarmen Blanco RN  Medication Review/Management: medications reviewed  Goal: Maintenance of Heart Failure Symptom Control  Outcome: Progressing  Intervention: Maintain Heart Failure Management  Recent Flowsheet Documentation  Taken 10/6/2024 1245 by Maricarmen Blanco RN  Medication Review/Management: medications reviewed  Taken 10/6/2024 0830 by Maricarmen Blanco RN  Medication Review/Management: medications reviewed  Goal: Blood Pressure in Desired Range  Outcome: Progressing  Intervention: Maintain Blood Pressure Management  Recent Flowsheet Documentation  Taken 10/6/2024 1245 by Maricarmen Blanco RN  Medication Review/Management: medications reviewed  Taken 10/6/2024 0830 by Maricarmen Blanco RN  Medication Review/Management: medications reviewed  Goal: Maintenance of  Osteoarthritis Symptom Control  Outcome: Progressing  Intervention: Maintain Osteoarthritis Symptom Control  Recent Flowsheet Documentation  Taken 10/6/2024 1245 by Maricarmen Blanco RN  Assistive Device Utilized:   walker   gait belt  Activity Management: activity adjusted per tolerance  Medication Review/Management: medications reviewed  Taken 10/6/2024 0830 by Maricarmen Blanco RN  Assistive Device Utilized:   walker   gait belt  Activity Management: activity adjusted per tolerance  Medication Review/Management: medications reviewed  Goal: Bariatric Home Regimen Maintained  Outcome: Progressing  Intervention: Maintain and Manage Postbariatric Surgery Care  Recent Flowsheet Documentation  Taken 10/6/2024 1245 by Maricarmen Blanco RN  Medication Review/Management: medications reviewed  Taken 10/6/2024 0830 by Maricarmen Blanco RN  Medication Review/Management: medications reviewed  Goal: Maintenance of Seizure Control  Outcome: Progressing  Intervention: Maintain Seizure Symptom Control  Recent Flowsheet Documentation  Taken 10/6/2024 1245 by Maricarmen Blanco RN  Sensory Stimulation Regulation: care clustered  Medication Review/Management: medications reviewed  Taken 10/6/2024 0830 by Maricarmen Blanco RN  Sensory Stimulation Regulation: care clustered  Medication Review/Management: medications reviewed     Problem: Dysrhythmia  Goal: Normalized Cardiac Rhythm  Outcome: Progressing  Intervention: Monitor and Manage Cardiac Rhythm Effect  Recent Flowsheet Documentation  Taken 10/6/2024 1245 by Maricarmen Blanco RN  VTE Prevention/Management: SCDs off (sequential compression devices)  Taken 10/6/2024 0830 by Maricarmen Blanco RN  VTE Prevention/Management: SCDs off (sequential compression devices)     Problem: Sleep Disturbance  Goal: Adequate Sleep/Rest  Outcome: Progressing  Intervention: Promote Sleep/Rest  Recent Flowsheet Documentation  Taken 10/6/2024 1245 by Maricarmen Blanco  RN  Sleep/Rest Enhancement: consistent schedule promoted  Taken 10/6/2024 0830 by Maricarmen Blanco RN  Sleep/Rest Enhancement: consistent schedule promoted   Goal Outcome Evaluation:       Patient is alert and able to let her needs be known. Reports some abdominal/back pain 3/10, PRN tylenol was given. K protocol initiated as K level was 3.3. Hgb was 8.7. No active bleeding noted. IV iron given per orders. VSS and Afib on the monitor. HR  at rest and 130-140 with exertion. Continue plan of care.

## 2024-10-07 ENCOUNTER — APPOINTMENT (OUTPATIENT)
Dept: OCCUPATIONAL THERAPY | Facility: HOSPITAL | Age: 89
DRG: 309 | End: 2024-10-07
Payer: COMMERCIAL

## 2024-10-07 ENCOUNTER — APPOINTMENT (OUTPATIENT)
Dept: PHYSICAL THERAPY | Facility: HOSPITAL | Age: 89
DRG: 309 | End: 2024-10-07
Payer: COMMERCIAL

## 2024-10-07 LAB
HGB BLD-MCNC: 9.8 G/DL (ref 11.7–15.7)
POTASSIUM SERPL-SCNC: 4 MMOL/L (ref 3.4–5.3)

## 2024-10-07 PROCEDURE — 210N000001 HC R&B IMCU HEART CARE

## 2024-10-07 PROCEDURE — 36415 COLL VENOUS BLD VENIPUNCTURE: CPT | Performed by: INTERNAL MEDICINE

## 2024-10-07 PROCEDURE — 250N000013 HC RX MED GY IP 250 OP 250 PS 637

## 2024-10-07 PROCEDURE — 97110 THERAPEUTIC EXERCISES: CPT | Mod: GP

## 2024-10-07 PROCEDURE — 99232 SBSQ HOSP IP/OBS MODERATE 35: CPT | Performed by: INTERNAL MEDICINE

## 2024-10-07 PROCEDURE — 250N000013 HC RX MED GY IP 250 OP 250 PS 637: Performed by: INTERNAL MEDICINE

## 2024-10-07 PROCEDURE — 84132 ASSAY OF SERUM POTASSIUM: CPT | Performed by: INTERNAL MEDICINE

## 2024-10-07 PROCEDURE — 97535 SELF CARE MNGMENT TRAINING: CPT | Mod: GO

## 2024-10-07 PROCEDURE — 250N000012 HC RX MED GY IP 250 OP 636 PS 637

## 2024-10-07 PROCEDURE — 85018 HEMOGLOBIN: CPT | Performed by: INTERNAL MEDICINE

## 2024-10-07 PROCEDURE — 97110 THERAPEUTIC EXERCISES: CPT | Mod: GO

## 2024-10-07 PROCEDURE — 99233 SBSQ HOSP IP/OBS HIGH 50: CPT | Performed by: INTERNAL MEDICINE

## 2024-10-07 PROCEDURE — 97116 GAIT TRAINING THERAPY: CPT | Mod: GP

## 2024-10-07 RX ORDER — DILTIAZEM HCL 60 MG
60 TABLET ORAL EVERY 8 HOURS SCHEDULED
Status: DISCONTINUED | OUTPATIENT
Start: 2024-10-07 | End: 2024-10-08

## 2024-10-07 RX ADMIN — LOSARTAN POTASSIUM AND HYDROCHLOROTHIAZIDE 1 TABLET: 25; 100 TABLET ORAL at 07:48

## 2024-10-07 RX ADMIN — Medication 5 MG: at 21:13

## 2024-10-07 RX ADMIN — FAMOTIDINE 20 MG: 20 TABLET ORAL at 21:13

## 2024-10-07 RX ADMIN — METOPROLOL TARTRATE 100 MG: 25 TABLET, FILM COATED ORAL at 07:44

## 2024-10-07 RX ADMIN — CLOPIDOGREL BISULFATE 75 MG: 75 TABLET ORAL at 07:43

## 2024-10-07 RX ADMIN — DILTIAZEM HYDROCHLORIDE 60 MG: 60 TABLET, FILM COATED ORAL at 21:13

## 2024-10-07 RX ADMIN — OXYCODONE HYDROCHLORIDE 5 MG: 5 TABLET ORAL at 07:43

## 2024-10-07 RX ADMIN — METOPROLOL TARTRATE 100 MG: 25 TABLET, FILM COATED ORAL at 20:25

## 2024-10-07 RX ADMIN — PREDNISONE 5 MG: 5 TABLET ORAL at 07:43

## 2024-10-07 RX ADMIN — OXYCODONE HYDROCHLORIDE 5 MG: 5 TABLET ORAL at 14:13

## 2024-10-07 RX ADMIN — DOCUSATE SODIUM 100 MG: 100 CAPSULE, LIQUID FILLED ORAL at 07:43

## 2024-10-07 RX ADMIN — DILTIAZEM HYDROCHLORIDE 60 MG: 60 TABLET, FILM COATED ORAL at 14:13

## 2024-10-07 RX ADMIN — ACETAMINOPHEN 650 MG: 325 TABLET ORAL at 20:26

## 2024-10-07 ASSESSMENT — ACTIVITIES OF DAILY LIVING (ADL)
ADLS_ACUITY_SCORE: 42

## 2024-10-07 NOTE — PLAN OF CARE
Goal Outcome Evaluation:       No acute issues overnight. Vitals stable, comfortable overnight. Afib on telemetry with HR in 's at rest.

## 2024-10-07 NOTE — PLAN OF CARE
Problem: Comorbidity Management  Goal: Maintenance of Asthma Control  Outcome: Progressing  Intervention: Maintain Asthma Symptom Control  Recent Flowsheet Documentation  Taken 10/6/2024 1618 by Yanci Hess RN  Medication Review/Management: medications reviewed  Goal: Maintenance of Behavioral Health Symptom Control  Outcome: Progressing  Intervention: Maintain Behavioral Health Symptom Control  Recent Flowsheet Documentation  Taken 10/6/2024 1618 by Yanci Hess, RN  Medication Review/Management: medications reviewed  Goal: Maintenance of COPD Symptom Control  Outcome: Progressing  Intervention: Maintain COPD Symptom Control  Recent Flowsheet Documentation  Taken 10/6/2024 1618 by Yanci Hess, RN  Supportive Measures:   active listening utilized   positive reinforcement provided   self-care encouraged  Medication Review/Management: medications reviewed  Goal: Blood Glucose Levels Within Targeted Range  Outcome: Progressing  Intervention: Monitor and Manage Glycemia  Recent Flowsheet Documentation  Taken 10/6/2024 1618 by Yanci Hess RN  Medication Review/Management: medications reviewed  Goal: Maintenance of Heart Failure Symptom Control  Outcome: Progressing  Intervention: Maintain Heart Failure Management  Recent Flowsheet Documentation  Taken 10/6/2024 1618 by Yanci Hess, RN  Medication Review/Management: medications reviewed  Goal: Blood Pressure in Desired Range  Outcome: Progressing  Intervention: Maintain Blood Pressure Management  Recent Flowsheet Documentation  Taken 10/6/2024 1618 by Yanci Hess, RN  Medication Review/Management: medications reviewed  Goal: Maintenance of Osteoarthritis Symptom Control  Outcome: Progressing  Intervention: Maintain Osteoarthritis Symptom Control  Recent Flowsheet Documentation  Taken 10/6/2024 1618 by Yanci Hess, RN  Assistive Device Utilized:   walker   gait belt  Activity Management: activity adjusted per  tolerance  Medication Review/Management: medications reviewed  Taken 10/6/2024 1614 by Yanci Hess, RN  Assistive Device Utilized:   walker   gait belt  Activity Management: ambulated outside room  Goal: Bariatric Home Regimen Maintained  Outcome: Progressing  Intervention: Maintain and Manage Postbariatric Surgery Care  Recent Flowsheet Documentation  Taken 10/6/2024 1618 by Yanci Hess, RN  Medication Review/Management: medications reviewed  Goal: Maintenance of Seizure Control  Outcome: Progressing  Intervention: Maintain Seizure Symptom Control  Recent Flowsheet Documentation  Taken 10/6/2024 1618 by Yanci Hess, RN  Sensory Stimulation Regulation:   care clustered   television on   lighting decreased  Medication Review/Management: medications reviewed   Goal Outcome Evaluation:    Pt is alert and oriented x 4. VSS. On room air. Able to get around with A01, gait belt and walker. Her HR continues to increase to the 140's with ambulation or movement.     Home CPAP applied at 2130.     Cardiac rhythm is A-fib uncontrolled rate .       Pt anticipates going to a TCU at discharge. Referrals have been sent out by case management. Awaiting acceptance.

## 2024-10-07 NOTE — PLAN OF CARE
"  Problem: Adult Inpatient Plan of Care  Goal: Plan of Care Review  Description: The Plan of Care Review/Shift note should be completed every shift.  The Outcome Evaluation is a brief statement about your assessment that the patient is improving, declining, or no change.  This information will be displayed automatically on your shift  note.  Outcome: Progressing  Goal: Patient-Specific Goal (Individualized)  Description: You can add care plan individualizations to a care plan. Examples of Individualization might be:  \"Parent requests to be called daily at 9am for status\", \"I have a hard time hearing out of my right ear\", or \"Do not touch me to wake me up as it startles  me\".  Outcome: Progressing  Goal: Absence of Hospital-Acquired Illness or Injury  Outcome: Progressing  Intervention: Identify and Manage Fall Risk  Recent Flowsheet Documentation  Taken 10/7/2024 1300 by Maricarmen Blanco RN  Safety Promotion/Fall Prevention: activity supervised  Taken 10/7/2024 0745 by Maricarmen Blanco RN  Safety Promotion/Fall Prevention: activity supervised  Intervention: Prevent Skin Injury  Recent Flowsheet Documentation  Taken 10/7/2024 1300 by Maricarmen Blanco RN  Skin Protection: adhesive use limited  Device Skin Pressure Protection: adhesive use limited  Taken 10/7/2024 0745 by Maricarmen Blanco RN  Skin Protection: adhesive use limited  Device Skin Pressure Protection: adhesive use limited  Intervention: Prevent and Manage VTE (Venous Thromboembolism) Risk  Recent Flowsheet Documentation  Taken 10/7/2024 1300 by Maricarmen Blanco RN  VTE Prevention/Management: SCDs off (sequential compression devices)  Taken 10/7/2024 0745 by Maricarmen Blanco RN  VTE Prevention/Management: SCDs off (sequential compression devices)  Intervention: Prevent Infection  Recent Flowsheet Documentation  Taken 10/7/2024 1300 by Maricarmen Blanco RN  Infection Prevention: cohorting utilized  Taken 10/7/2024 0745 by Francis" Maricarmen MURO RN  Infection Prevention: cohorting utilized  Goal: Optimal Comfort and Wellbeing  Outcome: Progressing  Goal: Readiness for Transition of Care  Outcome: Progressing     Problem: Skin Injury Risk Increased  Goal: Skin Health and Integrity  Outcome: Progressing  Intervention: Plan: Nurse Driven Intervention: Moisture Management  Recent Flowsheet Documentation  Taken 10/7/2024 1300 by Maricarmen Blanco RN  Moisture Interventions: Encourage regular toileting  Plan: Moisture Management: encourage regular toileting  Taken 10/7/2024 0745 by Maricarmen Blanco RN  Moisture Interventions: Encourage regular toileting  Plan: Moisture Management: encourage regular toileting  Intervention: Plan: Nurse Driven Intervention: Friction and Shear  Recent Flowsheet Documentation  Taken 10/7/2024 1300 by Maricarmen Blanco RN  Friction/Shear Interventions: HOB 30 degrees or less  Taken 10/7/2024 0745 by Maricarmen Blanco RN  Friction/Shear Interventions: HOB 30 degrees or less  Intervention: Optimize Skin Protection  Recent Flowsheet Documentation  Taken 10/7/2024 1300 by Maricarmen Blanco RN  Pressure Reduction Devices: other (see comments)  Skin Protection: adhesive use limited  Activity Management: activity adjusted per tolerance  Taken 10/7/2024 0745 by Maricarmen Blanco RN  Pressure Reduction Devices: other (see comments)  Skin Protection: adhesive use limited  Activity Management: activity adjusted per tolerance     Problem: Chest Pain  Goal: Resolution of Chest Pain Symptoms  Outcome: Progressing     Problem: Gas Exchange Impaired  Goal: Optimal Gas Exchange  Outcome: Progressing     Problem: Risk for Delirium  Goal: Optimal Coping  Outcome: Progressing  Intervention: Optimize Psychosocial Adjustment to Delirium  Recent Flowsheet Documentation  Taken 10/7/2024 1300 by Maricarmen Blanco RN  Supportive Measures: active listening utilized  Taken 10/7/2024 0745 by Maricarmen Blanco RN  Supportive  Measures: active listening utilized  Goal: Improved Behavioral Control  Outcome: Progressing  Intervention: Prevent and Manage Agitation  Recent Flowsheet Documentation  Taken 10/7/2024 1300 by Maricarmen Blanco RN  Environment Familiarity/Consistency: daily routine followed  Taken 10/7/2024 0745 by Maricarmen Blanco RN  Environment Familiarity/Consistency: daily routine followed  Intervention: Minimize Safety Risk  Recent Flowsheet Documentation  Taken 10/7/2024 1300 by Maricarmen Blanco RN  Communication Enhancement Strategies: call light answered in person  Enhanced Safety Measures: pain management  Taken 10/7/2024 0745 by Maricarmen Blanco RN  Communication Enhancement Strategies: call light answered in person  Enhanced Safety Measures: pain management  Goal: Improved Attention and Thought Clarity  Outcome: Progressing  Intervention: Maximize Cognitive Function  Recent Flowsheet Documentation  Taken 10/7/2024 1300 by Maricarmen Blanco RN  Sensory Stimulation Regulation: care clustered  Reorientation Measures: clock in view  Taken 10/7/2024 0745 by Maricarmen Blanco RN  Sensory Stimulation Regulation: care clustered  Reorientation Measures: clock in view  Goal: Improved Sleep  Outcome: Progressing     Problem: Comorbidity Management  Goal: Maintenance of Asthma Control  Outcome: Progressing  Intervention: Maintain Asthma Symptom Control  Recent Flowsheet Documentation  Taken 10/7/2024 1300 by Maricarmen Blanco RN  Medication Review/Management: medications reviewed  Taken 10/7/2024 0745 by Maricarmen Blanco RN  Medication Review/Management: medications reviewed  Goal: Maintenance of Behavioral Health Symptom Control  Outcome: Progressing  Intervention: Maintain Behavioral Health Symptom Control  Recent Flowsheet Documentation  Taken 10/7/2024 1300 by Maricarmen Blanco RN  Medication Review/Management: medications reviewed  Taken 10/7/2024 0745 by Maricarmen Blanco RN  Medication  Review/Management: medications reviewed  Goal: Maintenance of COPD Symptom Control  Outcome: Progressing  Intervention: Maintain COPD Symptom Control  Recent Flowsheet Documentation  Taken 10/7/2024 1300 by Maricarmen Blanco RN  Supportive Measures: active listening utilized  Medication Review/Management: medications reviewed  Taken 10/7/2024 0745 by Maricarmen Blanco RN  Supportive Measures: active listening utilized  Medication Review/Management: medications reviewed  Goal: Blood Glucose Levels Within Targeted Range  Outcome: Progressing  Intervention: Monitor and Manage Glycemia  Recent Flowsheet Documentation  Taken 10/7/2024 1300 by Maricarmen Blanco RN  Medication Review/Management: medications reviewed  Taken 10/7/2024 0745 by Maricarmen Blanco RN  Medication Review/Management: medications reviewed  Goal: Maintenance of Heart Failure Symptom Control  Outcome: Progressing  Intervention: Maintain Heart Failure Management  Recent Flowsheet Documentation  Taken 10/7/2024 1300 by Maricarmen Blanco RN  Medication Review/Management: medications reviewed  Taken 10/7/2024 0745 by Maricarmen Blanco RN  Medication Review/Management: medications reviewed  Goal: Blood Pressure in Desired Range  Outcome: Progressing  Intervention: Maintain Blood Pressure Management  Recent Flowsheet Documentation  Taken 10/7/2024 1300 by Maricarmen Blanco RN  Medication Review/Management: medications reviewed  Taken 10/7/2024 0745 by Maricarmen Blanco RN  Medication Review/Management: medications reviewed  Goal: Maintenance of Osteoarthritis Symptom Control  Outcome: Progressing  Intervention: Maintain Osteoarthritis Symptom Control  Recent Flowsheet Documentation  Taken 10/7/2024 1300 by Maricarmen Blanco RN  Assistive Device Utilized:   gait belt   walker  Activity Management: activity adjusted per tolerance  Medication Review/Management: medications reviewed  Taken 10/7/2024 0745 by Maricarmen Blanco  RN  Assistive Device Utilized:   gait belt   walker  Activity Management: activity adjusted per tolerance  Medication Review/Management: medications reviewed  Goal: Bariatric Home Regimen Maintained  Outcome: Progressing  Intervention: Maintain and Manage Postbariatric Surgery Care  Recent Flowsheet Documentation  Taken 10/7/2024 1300 by Maricarmen Blanco RN  Medication Review/Management: medications reviewed  Taken 10/7/2024 0745 by Maricarmen Blanco RN  Medication Review/Management: medications reviewed  Goal: Maintenance of Seizure Control  Outcome: Progressing  Intervention: Maintain Seizure Symptom Control  Recent Flowsheet Documentation  Taken 10/7/2024 1300 by Maricarmen Blanco RN  Sensory Stimulation Regulation: care clustered  Medication Review/Management: medications reviewed  Taken 10/7/2024 0745 by Maricarmen Blanco RN  Sensory Stimulation Regulation: care clustered  Medication Review/Management: medications reviewed     Problem: Dysrhythmia  Goal: Normalized Cardiac Rhythm  Outcome: Progressing  Intervention: Monitor and Manage Cardiac Rhythm Effect  Recent Flowsheet Documentation  Taken 10/7/2024 1300 by Maricarmen Blanco RN  VTE Prevention/Management: SCDs off (sequential compression devices)  Taken 10/7/2024 0745 by Maricarmen Blanco RN  VTE Prevention/Management: SCDs off (sequential compression devices)     Problem: Sleep Disturbance  Goal: Adequate Sleep/Rest  Outcome: Progressing   Goal Outcome Evaluation:       Patient is alert and able to let her needs be known. Rates pain 10/10 this morning, PRN oxycodone was given and effective for her. AFIb on the monitor. HR running 90-100s and goes up to 160 with exertion. K protocol in place, recheck in morning. OT/PT working with her. Continue plan of care.

## 2024-10-07 NOTE — PROGRESS NOTES
Care Management Follow Up    Length of Stay (days): 3    Expected Discharge Date: 10/07/2024     Concerns to be Addressed:     Care progression - discharge planning  Patient plan of care discussed at interdisciplinary rounds: Yes    Anticipated Discharge Disposition:  Transitional care     Anticipated Discharge Services:  Transitional care  Anticipated Discharge DME:  NA    Patient/family educated on Medicare website which has current facility and service quality ratings:  Yes  Education Provided on the Discharge Plan:  Yes per team  Patient/Family in Agreement with the Plan:  Yes    Referrals Placed by CM/SW:  Yes  Private pay costs discussed: Not applicable    Discussed  Partnership in Safe Discharge Planning  document with patient/family: Yes: Meera     Handoff Completed: No, handoff not indicated or clinically appropriate    Additional Information:  [id number] rec'd a message from Meera to return call [phone].  Called Meera and she states she must have called and left a voicemail by mistake. She is currently in the hospital with patient at this time.    Social Hx:  Assessment: Follow. Live alone in a [facility] apartment, 2 dtrs involved  Last Note:10/7/24  Plan: Accepted to [facility]. PAS completed.  Needs: 10/8 f/up with daughter regarding the transport  Hand off sent: NA  Transport: Family    Next Steps: RNCM to follow for medical progression, recommendations, and final discharge plan.     Ellen Barber RN     Per provider, Dr. Ramirez, patient maybe ready. Unsure if patient wants to go home or TCU.  Dr. Ramirez waiting for hgb result.    Met with patient and her daughter, Meera, at bedside. Meera states they decided to go to TCU and prefer [facility].    Informed Meera [facility] accepted. [facility] declined, no bed available.  Meera agreed to [facility]  Discussed out of pocket cost of [facility] medical transportation by wheelchair/strechter with patient and family member, Meera.        [facility]    Sent a message to update Dr. Ramirez    [phone]

## 2024-10-07 NOTE — PROGRESS NOTES
Cardiology Progress Note    Assessment:  Atrial fibrillation new onset, elevated ventricular response rate  Abdominal malignancy   Anemia with melena  Hypertension,controlled  Plan:  Hold losartan hydrochlorothiazide to make room for additional rate control medication namely diltiazem, continue metoprolol   Not appropriate candidate for anticoagulation due to anemia/ potential GI bleeding, advanced age/gait instability    Subjective:   Denies chest complaints    Objective:   /60 (BP Location: Left arm)   Pulse 92   Temp 97.9  F (36.6  C) (Oral)   Resp 18   Ht 1.524 m (5')   Wt 78.7 kg (173 lb 6.4 oz)   SpO2 95%   BMI 33.86 kg/m      Intake/Output Summary (Last 24 hours) at 10/7/2024 1353  Last data filed at 10/7/2024 0800  Gross per 24 hour   Intake 1250 ml   Output 200 ml   Net 1050 ml         Physical Exam:  GENERAL: no distress  NECK: No JVD  LUNGS: Clear to auscultation.  CARDIAC: irregular  rhythm, S1 & S2 normal.  No heaves, thrills, gallops or murmurs.  ABDOMEN: flat, negative hepatosplenomegaly, soft and non-tender.  EXTREMITIES: No evidence of cyanosis, clubbing or edema.    Current Facility-Administered Medications Ordered in Epic   Medication Dose Route Frequency Provider Last Rate Last Admin     acetaminophen (TYLENOL) tablet 650 mg  650 mg Oral At Bedtime Umair Jung MD   650 mg at 10/06/24 2018     acetaminophen (TYLENOL) tablet 650 mg  650 mg Oral Q6H PRN Umair Jung MD   650 mg at 10/06/24 1244     calcium carbonate (TUMS) chewable tablet 1,000 mg  1,000 mg Oral 4x Daily PRN Domitila Tierney NP         clopidogrel (PLAVIX) tablet 75 mg  75 mg Oral Daily Domitila Tierney NP   75 mg at 10/07/24 0743     diltiazem (CARDIZEM) tablet 60 mg  60 mg Oral Q8H Minh Tobin MD         docusate sodium (COLACE) capsule 100 mg  100 mg Oral Daily Domitila Tierney NP   100 mg at 10/07/24 0743     famotidine (PEPCID) tablet 20 mg  20 mg Oral At Bedtime Domitila Tierney NP   20 mg at 10/06/24  2018     [Held by provider] ferrous sulfate (FEROSUL) tablet 325 mg  325 mg Oral Daily Domitila Tierney NP   325 mg at 10/04/24 0815     lidocaine (LMX4) cream   Topical Q1H PRN Domitila Tierney NP         lidocaine 1 % 0.1-1 mL  0.1-1 mL Other Q1H PRN Domitila Tierney NP         [Held by provider] losartan-hydrochlorothiazide (HYZAAR) 100-25 MG per tablet 1 tablet  1 tablet Oral Daily Umair Jung MD   1 tablet at 10/07/24 0748     melatonin tablet 5 mg  5 mg Oral At Bedtime PRN Efren Cardona MD   5 mg at 10/03/24 2346     metoprolol tartrate (LOPRESSOR) tablet 100 mg  100 mg Oral BID Gigi Erwin MD   100 mg at 10/07/24 0744     naloxone (NARCAN) injection 0.2 mg  0.2 mg Intravenous Q2 Min PRN Domitila Tierney NP        Or     naloxone (NARCAN) injection 0.4 mg  0.4 mg Intravenous Q2 Min PRN Domitila Tierney NP        Or     naloxone (NARCAN) injection 0.2 mg  0.2 mg Intramuscular Q2 Min PRN Domitila Tierney NP        Or     naloxone (NARCAN) injection 0.4 mg  0.4 mg Intramuscular Q2 Min PRN Domitila Tierney NP         ondansetron (ZOFRAN ODT) ODT tab 4 mg  4 mg Oral Q12H PRN Domitila Tierney NP         oxyCODONE (ROXICODONE) tablet 5 mg  5 mg Oral Q4H PRN Umair Jung MD   5 mg at 10/07/24 0743     oxyCODONE IR (ROXICODONE) half-tab 2.5 mg  2.5 mg Oral Q4H PRN Domitila Tierney NP   2.5 mg at 10/04/24 0824     predniSONE (DELTASONE) tablet 5 mg  5 mg Oral Daily Domitila Tierney NP   5 mg at 10/07/24 0743     senna-docusate (SENOKOT-S/PERICOLACE) 8.6-50 MG per tablet 1 tablet  1 tablet Oral BID PRN Domitila Tierney NP        Or     senna-docusate (SENOKOT-S/PERICOLACE) 8.6-50 MG per tablet 2 tablet  2 tablet Oral BID PRN Domitila Tierney, NP         sodium chloride (PF) 0.9% PF flush 3 mL  3 mL Intracatheter Q8H Domitila Tierney NP   3 mL at 10/07/24 0616     sodium chloride (PF) 0.9% PF flush 3 mL  3 mL Intracatheter q1 min prn Domitila Tierney NP         No current [facility]-ordered outpatient medications on file.  "      Cardiographics:    Telemetry: Atrial fibrillation     Echocardiogram:   The visual ejection fraction is 60-65%. No regional wall motion abnormalities  noted.  Mildly decreased right ventricular systolic function  Mild left atrial enlargement  There is mild to moderate (1-2+) mitral regurgitation.  There is mild to moderate (1-2+) aortic regurgitation.  There is mild to moderate (1-2+) tricuspid regurgitation.  The right ventricular systolic pressure is approximated at 55 mmHg.       Lab Results    Chemistry/lipid CBC Cardiac Enzymes/BNP/TSH/INR   No results for input(s): \"CHOL\", \"HDL\", \"LDL\", \"TRIG\", \"CHOLHDLRATIO\" in the last 62279 hours.  No results for input(s): \"LDL\" in the last 50911 hours.  Recent Labs   Lab Test 10/07/24  0420 10/06/24  1403 10/06/24  0804   NA  --   --  138   POTASSIUM 4.0   < > 3.3*   CHLORIDE  --   --  102   CO2  --   --  27   GLC  --   --  98   BUN  --   --  9.0   CR  --   --  0.67   GFRESTIMATED  --   --  80   MAYRA  --   --  8.4*    < > = values in this interval not displayed.     Recent Labs   Lab Test 10/06/24  0804 10/04/24  0440 10/03/24  1128   CR 0.67 0.62 0.61     No results for input(s): \"A1C\" in the last 24271 hours.       Recent Labs   Lab Test 10/07/24  0942 10/06/24  0804   WBC  --  7.8   HGB 9.8* 8.7*   HCT  --  28.0*   MCV  --  91   PLT  --  350     Recent Labs   Lab Test 10/07/24  0942 10/06/24  0804 10/05/24  1002   HGB 9.8* 8.7* 10.3*    No results for input(s): \"TROPONINI\" in the last 63840 hours.  No results for input(s): \"BNP\", \"NTBNPI\", \"NTBNP\" in the last 00110 hours.  No results for input(s): \"TSH\" in the last 53005 hours.  No results for input(s): \"INR\" in the last 97276 hours.                "

## 2024-10-08 ENCOUNTER — APPOINTMENT (OUTPATIENT)
Dept: PHYSICAL THERAPY | Facility: HOSPITAL | Age: 89
DRG: 309 | End: 2024-10-08
Payer: COMMERCIAL

## 2024-10-08 ENCOUNTER — APPOINTMENT (OUTPATIENT)
Dept: OCCUPATIONAL THERAPY | Facility: HOSPITAL | Age: 89
DRG: 309 | End: 2024-10-08
Payer: COMMERCIAL

## 2024-10-08 DIAGNOSIS — C18.3 MALIGNANT NEOPLASM OF HEPATIC FLEXURE (H): Primary | ICD-10-CM

## 2024-10-08 DIAGNOSIS — C18.2 MALIGNANT NEOPLASM OF ASCENDING COLON (H): ICD-10-CM

## 2024-10-08 DIAGNOSIS — C19 COLORECTAL MALIGNANT NEOPLASM WITH HIGH-FREQUENCY MICROSATELLITE INSTABILITY (MSI-H) (H): ICD-10-CM

## 2024-10-08 PROBLEM — I48.91 ATRIAL FIBRILLATION WITH RVR (H): Status: RESOLVED | Noted: 2024-10-03 | Resolved: 2024-10-08

## 2024-10-08 LAB
HOLD SPECIMEN: NORMAL
POTASSIUM SERPL-SCNC: 4.1 MMOL/L (ref 3.4–5.3)

## 2024-10-08 PROCEDURE — 84132 ASSAY OF SERUM POTASSIUM: CPT | Performed by: INTERNAL MEDICINE

## 2024-10-08 PROCEDURE — 999N000157 HC STATISTIC RCP TIME EA 10 MIN

## 2024-10-08 PROCEDURE — 250N000013 HC RX MED GY IP 250 OP 250 PS 637: Performed by: INTERNAL MEDICINE

## 2024-10-08 PROCEDURE — 97110 THERAPEUTIC EXERCISES: CPT | Mod: GP

## 2024-10-08 PROCEDURE — 36415 COLL VENOUS BLD VENIPUNCTURE: CPT | Performed by: INTERNAL MEDICINE

## 2024-10-08 PROCEDURE — 250N000012 HC RX MED GY IP 250 OP 636 PS 637

## 2024-10-08 PROCEDURE — 97116 GAIT TRAINING THERAPY: CPT | Mod: GP

## 2024-10-08 PROCEDURE — 99232 SBSQ HOSP IP/OBS MODERATE 35: CPT | Performed by: INTERNAL MEDICINE

## 2024-10-08 PROCEDURE — 99233 SBSQ HOSP IP/OBS HIGH 50: CPT | Performed by: INTERNAL MEDICINE

## 2024-10-08 PROCEDURE — 250N000013 HC RX MED GY IP 250 OP 250 PS 637

## 2024-10-08 PROCEDURE — 210N000001 HC R&B IMCU HEART CARE

## 2024-10-08 PROCEDURE — 97535 SELF CARE MNGMENT TRAINING: CPT | Mod: GO

## 2024-10-08 PROCEDURE — 250N000011 HC RX IP 250 OP 636

## 2024-10-08 RX ORDER — DILTIAZEM HYDROCHLORIDE 30 MG/1
30 TABLET, FILM COATED ORAL EVERY 8 HOURS SCHEDULED
Status: DISCONTINUED | OUTPATIENT
Start: 2024-10-08 | End: 2024-10-09 | Stop reason: HOSPADM

## 2024-10-08 RX ORDER — LORAZEPAM 2 MG/ML
0.5 INJECTION INTRAMUSCULAR EVERY 4 HOURS PRN
OUTPATIENT
Start: 2024-10-29

## 2024-10-08 RX ORDER — ALBUTEROL SULFATE 90 UG/1
1-2 INHALANT RESPIRATORY (INHALATION)
Start: 2024-10-29

## 2024-10-08 RX ORDER — HEPARIN SODIUM,PORCINE 10 UNIT/ML
5-20 VIAL (ML) INTRAVENOUS DAILY PRN
OUTPATIENT
Start: 2024-10-29

## 2024-10-08 RX ORDER — HEPARIN SODIUM (PORCINE) LOCK FLUSH IV SOLN 100 UNIT/ML 100 UNIT/ML
5 SOLUTION INTRAVENOUS
OUTPATIENT
Start: 2024-10-29

## 2024-10-08 RX ORDER — ALBUTEROL SULFATE 0.83 MG/ML
2.5 SOLUTION RESPIRATORY (INHALATION)
OUTPATIENT
Start: 2024-10-29

## 2024-10-08 RX ORDER — DIPHENHYDRAMINE HYDROCHLORIDE 50 MG/ML
50 INJECTION INTRAMUSCULAR; INTRAVENOUS
Start: 2024-10-29

## 2024-10-08 RX ORDER — EPINEPHRINE 1 MG/ML
0.3 INJECTION, SOLUTION, CONCENTRATE INTRAVENOUS EVERY 5 MIN PRN
OUTPATIENT
Start: 2024-10-29

## 2024-10-08 RX ORDER — MEPERIDINE HYDROCHLORIDE 25 MG/ML
25 INJECTION INTRAMUSCULAR; INTRAVENOUS; SUBCUTANEOUS EVERY 30 MIN PRN
OUTPATIENT
Start: 2024-10-29

## 2024-10-08 RX ADMIN — ONDANSETRON 4 MG: 4 TABLET, ORALLY DISINTEGRATING ORAL at 15:58

## 2024-10-08 RX ADMIN — PREDNISONE 5 MG: 5 TABLET ORAL at 09:32

## 2024-10-08 RX ADMIN — DOCUSATE SODIUM 100 MG: 100 CAPSULE, LIQUID FILLED ORAL at 09:32

## 2024-10-08 RX ADMIN — OXYCODONE HYDROCHLORIDE 2.5 MG: 5 TABLET ORAL at 04:32

## 2024-10-08 RX ADMIN — CLOPIDOGREL BISULFATE 75 MG: 75 TABLET ORAL at 09:32

## 2024-10-08 RX ADMIN — FAMOTIDINE 20 MG: 20 TABLET ORAL at 20:46

## 2024-10-08 RX ADMIN — ACETAMINOPHEN 650 MG: 325 TABLET ORAL at 20:46

## 2024-10-08 RX ADMIN — DILTIAZEM HYDROCHLORIDE 30 MG: 30 TABLET, FILM COATED ORAL at 21:53

## 2024-10-08 RX ADMIN — METOPROLOL TARTRATE 100 MG: 25 TABLET, FILM COATED ORAL at 20:45

## 2024-10-08 RX ADMIN — DILTIAZEM HYDROCHLORIDE 60 MG: 60 TABLET, FILM COATED ORAL at 06:18

## 2024-10-08 RX ADMIN — METOPROLOL TARTRATE 100 MG: 25 TABLET, FILM COATED ORAL at 09:31

## 2024-10-08 RX ADMIN — ACETAMINOPHEN 650 MG: 325 TABLET ORAL at 09:44

## 2024-10-08 ASSESSMENT — ACTIVITIES OF DAILY LIVING (ADL)
ADLS_ACUITY_SCORE: 42
ADLS_ACUITY_SCORE: 41
ADLS_ACUITY_SCORE: 42
ADLS_ACUITY_SCORE: 41
ADLS_ACUITY_SCORE: 42
ADLS_ACUITY_SCORE: 41
ADLS_ACUITY_SCORE: 42

## 2024-10-08 NOTE — PLAN OF CARE
Problem: Adult Inpatient Plan of Care  Goal: Plan of Care Review  Description: The Plan of Care Review/Shift note should be completed every shift.  The Outcome Evaluation is a brief statement about your assessment that the patient is improving, declining, or no change.  This information will be displayed automatically on your shift  note.  Outcome: Progressing   Goal Outcome Evaluation:                    Up ambulating in with assist of 1, ate well for dinner, family at bedside

## 2024-10-08 NOTE — PLAN OF CARE
Problem: Adult Inpatient Plan of Care  Goal: Optimal Comfort and Wellbeing  Outcome: Progressing     Problem: Chest Pain  Goal: Resolution of Chest Pain Symptoms  Outcome: Adequate for Care Transition     Problem: Risk for Delirium  Goal: Optimal Coping  Outcome: Adequate for Care Transition  Intervention: Optimize Psychosocial Adjustment to Delirium  Recent Flowsheet Documentation  Taken 10/8/2024 0030 by Ligia Omer RN  Supportive Measures: active listening utilized      Goal Outcome Evaluation:    Pt. Has stabbing pain off and on in right quad ( gall bladder / liver area)   States, yes she did have BM yesterday.   Oxy 2.5 mg given for pain.

## 2024-10-08 NOTE — PROGRESS NOTES
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  Not a resectable condition at this time due to ongoing medical issues and perhaps even locally advanced disease.  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  At this time colorectal surgery does not think they can operate on her.  Recommended consideration of adjuvant/neoadjuvant immunotherapy.  This could also be a palliative approach.  Patient at this time is wanting to try something to keep her cancer under control.  Family is also on board with that.  We will plan on getting a PET scan done as an outpatient and then follow-up with me in the clinic to start on Keytruda.  Atrial fibrillation for which she is currently being rate controlled.  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  Needs for better supportive care.    Medical decision Making    Presenting advanced cancer.  Multiple comorbid conditions.Reviewed notes from each unique source.  Reviewed each unique test.    Ordered tests.    Independently interpreted lab tests and radiological exams performed by other physicians. Independent historian account obtained as noted.  Treatment plan discussed with Dr. Calos Harrington      Cancer Staging   No matching staging information was found for the patient.      History of Present Illness    Ms. Susan Fair  is a 89 year old who has recently been diagnosed with colon cancer located in the hepatic flexure measuring about 6 cm in size.  Presenting after she came to the hospital in the end of September 2024 and had some abdominal pain.  CT scan was done which showed this mass.  She also had some anemia.  Underwent colonoscopy and the biopsy confirmed  that this is colon cancer.  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     While the patient was being evaluated for surgical treatment she developed atrial fibrillation.  She is also 89 years old.  Has multiple medical issues and comorbidities.  Surgical team is getting worried that she may not be able to handle surgery very well.  Therefore advised her to be seen by us to discuss immunotherapy treatment.     The patient was seen in her room.  She was accompanied by her daughter guards on today's visit.  She is overall feeling fair.  Has some good days and bad days.    Currently the plan is to her to go to TCU.  Patient and family wondering about neoadjuvant/palliative chemotherapy with the        Review of Systems    Pertinent findings noted in history.    Physical Exam    /54   Pulse 64   Temp 97.5  F (36.4  C) (Oral)   Resp 18   Ht 1.524 m (5')   Wt 81.6 kg (180 lb)   SpO2 97%   BMI 35.15 kg/m      GENERAL: no acute distress. Cooperative in conversation.   HEENT: pupils are equal, round and reactive. Oral mucosa is moist and intact.  RESP:Chest symmetric. Regular respiratory rate. No stridor.  ABD: Nondistended, soft.  EXTREMITIES: No lower extremity edema.   NEURO: non focal. Alert and oriented x3.   PSYCH: within normal limits. No depression or anxiety.  SKIN: warm dry intact     Lab Results Reviewed    Recent Results (from the past 24 hour(s))   Potassium    Collection Time: 10/08/24  4:12 AM   Result Value Ref Range    Potassium 4.1 3.4 - 5.3 mmol/L   Extra Purple Top EDTA (LAB USE ONLY)    Collection Time: 10/08/24  4:12 AM   Result Value Ref Range    Hold Specimen [facility]         Imaging Reviewed    No results found.     Signed by: Pranay Peter MD    This note has been dictated using voice recognition software. Any grammatical or context distortions are unintentional and  inherent to the software

## 2024-10-08 NOTE — PROGRESS NOTES
Care Management Follow Up    Length of Stay (days): 4    Expected Discharge Date: 10/09/2024    Anticipated Discharge Plan:       Transportation: Anticipate Family/friend    PT Recommendations: Transitional Care Facility, Per plan established by the PT  OT Recommendations:  Transitional Care Facility     Barriers to Discharge: medical stability    Prior Living Situation: apartment, independent living facility with alone    Discussed  Partnership in Safe Discharge Planning  document with patient/family: No     Handoff Completed: No, handoff not indicated or clinically appropriate    Patient/Spokesperson Updated: No    Additional Information:  See below     Next Steps: continue to follow     Amina Syed RN        Spoke to Pam[facility] at [facility]. They can accept patient today. Needs her there by 1630 if possible     1:29 PM  Per hospitalist , not ready for discharge. Message sent to [facility] via epic to update

## 2024-10-08 NOTE — PROGRESS NOTES
[facility]    Medicine Progress Note - Hospitalist Service    Date of Admission:  10/3/2024    Assessment & Plan   Susan Fair is a 89 year old female with history of hypertension, JAGDISH, hypercholesterolemia and prior CVA admitted due to chest pain and atrial fibrillation RVR. Patient was discharged home a day PTA from hospital where she found to have colonic mass, likely malignant with 4.2 cm left adnexal cystic lesion and prominent mesenteric lymph node. The patient reports when she awoke in the morning she felt fine, but for 1-2 hours she was having centralized chest pain. Patient noted that she was coughing up phlegm this morning, and that after coughing she felt better.  EKG obtained in the ER patient in atrial fibrillation RVR rate in 130s.  Patient given 3 doses of IV metoprolol slowed heart rate down to 100s.       # Chest pain- resolved  # Atrial fibrillation  # RVR- resolved  -EKG obtained and reviewed. Rate 130-120  -IV metoprolol 5 mg x 3 given in ED  -Metoprolol IV 5 mg as needed for heart rate above 110  -Troponin 19-->18  -Cardiac telemetry monitor  -Cardiology consulted. Elevated CHADS-Vasc score noted and discussed with patient/family. Given her age, tendency for fall. Anemia, colon cancer, patient is deemed not a good candidate for AC.  -Metoprolol oral started 10/3. Dose increased 10/04, increased further to 100mg BID 10/05. Dilt 60mg TID started 10/07 dose decreased to 30 mg 3 times daily 10/8  -Losartan and hydrochlorothiazide discontinued to allow for better rate control     #Colonic mass-possibly malignant  Abdominal pain, improved  -CT scan of the abdomen and pelvis showed irregular masslike wall thickening of the hepatic flexure with adjacent inflammatory changes, suspicious for primary colonic neoplasm for which -Colonoscopy completed, with biopsy  -Biopsy report discussed with the patient/family. It showed INVASIVE WELL TO MODERATELY DIFFERENTIATED ADENOCARCINOMA  WITH FOCAL MUCINOUS DIFFERENTIATION   -Patient/family discussed the treatment options with Dr Gomez from [facility] 10/04. Options discussed were right hemicolectomy vs immunotherapy vs hospice type of care. Dr Gomez also recommended getting oncologist's input. Oncology consulted. Patient can follow up with Dr Gomez in the clinic on 10/10/24 to discuss the treatment plan further  -Patient/family seems inclined towards immunotherapy; however if they choose surgery, will need cardiac clearance per [facility].     # Adnexal mass, left  -Patient/family request further work up with pelvis ultrasound as recommended by radiologist. Pelvis ultrasound done on 10/04; findings were consistent with ovarian cyst. Recommendation is to repeat pelvis ultrasound in 6 months    # Anemia, chronic   #Melena  -Hemoglobin is more or less stable  -Patient reports black stools for quite some time. Unclear if its due to iron pills or low grade oozing from the malignancy. Keep holding iron pills. Given IV iron 200mg x 3. Continue to monitor hgb and stool color    #Hypertension  -Monitor blood pressure, stable at this time  -Hold PTA amlodipine. Hold PTA losartan-hydrochlorothiazide for now    #Concern for PMR  Patient was taking prednisone PTA for possible PMR.  Patient was evaluated by rheumatologist who felt diagnosis of PMR is unlikely and recommends tapering off prednisone.  Continue PTA prednisone 5 mg daily with plans to taper to 2.5 mg daily in the next 1 week      # Hyponatremia  Sodium improved  Trend BMP          Diet: Combination Diet Regular Diet Adult    DVT Prophylaxis: Pneumatic Compression Devices  Golden Catheter: Not present  Lines: None     Cardiac Monitoring: ACTIVE order. Indication: Tachyarrhythmias, acute (48 hours)  Code Status: Full Code      Clinically Significant Risk Factors                  # Hypertension: Noted on problem list           # Obesity: Estimated body mass index is 35.15 kg/m  as calculated from the following:     Height as of this encounter: 1.524 m (5').    Weight as of this encounter: 81.6 kg (180 lb).      # Financial/Environmental Concerns: none         Disposition Plan     Medically Ready for Discharge: Anticipated Tomorrow             Yesenia Nunes MD  Hospitalist Service  [facility]  Securely message with [employer] (more info)  Text page via [employer] Paging/Directory   ______________________________________________________________________    Interval History   Mrs. Fair is new to me today.  Her heart rate is now dipping down into the 50s but mainly within normal range.  Discussed with her and daughter.  Plans for [facility] follow-up outpatient.  She will need a PET scan as well outpatient.  She stated that she is not sure if she wants to receive treatment and is leaning towards denying surgery.  Diltiazem dose was decreased today.  Will continue to monitor heart rate and likely discharge to TCU tomorrow.    Physical Exam   Vital Signs: Temp: 97.9  F (36.6  C) Temp src: Oral BP: 126/58 Pulse: 65   Resp: 18 SpO2: 96 % O2 Device: None (Room air)    Weight: 180 lbs 0 oz    General Appearance: Awake, alert, in no acute distress  Respiratory: CTAB, no wheeze  Cardiovascular: irregular  GI: soft, nontender, non distended, normal bowel sounds  Skin: no jaundice, no rash      Medical Decision Making       50 MINUTES SPENT BY ME on the date of service doing chart review, history, exam, documentation & further activities per the note.      Data     I have personally reviewed the following data over the past 24 hrs:    N/A  \   N/A   / N/A     N/A N/A N/A /  N/A   4.1 N/A N/A \       Imaging results reviewed over the past 24 hrs:   No results found for this or any previous visit (from the past 24 hour(s)).

## 2024-10-08 NOTE — PROGRESS NOTES
Cardiology Progress Note    Assessment:  Atrial fibrillation new onset, controlled to mildly slow ventricular response rateafter addition of diltiazem v  Abdominal malignancy   Anemia with melena  Hypertension,controlled  Plan:  Decrease diltiazem to 30 mg 3 times a day continue current dose of metoprolol  Not appropriate candidate for anticoagulation due to anemia/ potential GI bleeding, advanced age/gait instability    Subjective:   Denies chest complaints    Objective:   /54   Pulse 64   Temp 97.5  F (36.4  C) (Oral)   Resp 18   Ht 1.524 m (5')   Wt 81.6 kg (180 lb)   SpO2 97%   BMI 35.15 kg/m      Intake/Output Summary (Last 24 hours) at 10/7/2024 1353  Last data filed at 10/7/2024 0800  Gross per 24 hour   Intake 1250 ml   Output 200 ml   Net 1050 ml         Physical Exam:  GENERAL: no distress  NECK: No JVD  LUNGS: Clear to auscultation.  CARDIAC: irregular  rhythm, S1 & S2 normal.  No heaves, thrills, gallops or murmurs.  ABDOMEN: flat, negative hepatosplenomegaly, soft and non-tender.  EXTREMITIES: No evidence of cyanosis, clubbing or edema.    Current Facility-Administered Medications Ordered in Epic   Medication Dose Route Frequency Provider Last Rate Last Admin     acetaminophen (TYLENOL) tablet 650 mg  650 mg Oral At Bedtime Umair Jung MD   650 mg at 10/07/24 2026     acetaminophen (TYLENOL) tablet 650 mg  650 mg Oral Q6H PRN mUair Jung MD   650 mg at 10/08/24 0944     calcium carbonate (TUMS) chewable tablet 1,000 mg  1,000 mg Oral 4x Daily PRN Domitila Tierney NP         clopidogrel (PLAVIX) tablet 75 mg  75 mg Oral Daily Domitila Tierney NP   75 mg at 10/08/24 0932     diltiazem (CARDIZEM) tablet 30 mg  30 mg Oral Q8H Minh Tobin MD         docusate sodium (COLACE) capsule 100 mg  100 mg Oral Daily Domitila Tierney NP   100 mg at 10/08/24 0932     famotidine (PEPCID) tablet 20 mg  20 mg Oral At Bedtime Domitila Tierney NP   20 mg at 10/07/24 2113     [Held by provider]  ferrous sulfate (FEROSUL) tablet 325 mg  325 mg Oral Daily Domitila Tierney NP   325 mg at 10/04/24 0815     lidocaine (LMX4) cream   Topical Q1H PRN Domitila Tierney NP         lidocaine 1 % 0.1-1 mL  0.1-1 mL Other Q1H PRN Domitila Tierney NP         melatonin tablet 5 mg  5 mg Oral At Bedtime PRN Efren Cardona MD   5 mg at 10/07/24 2113     metoprolol tartrate (LOPRESSOR) tablet 100 mg  100 mg Oral BID Gigi Erwin MD   100 mg at 10/08/24 0931     naloxone (NARCAN) injection 0.2 mg  0.2 mg Intravenous Q2 Min PRN Domitila Tierney NP        Or     naloxone (NARCAN) injection 0.4 mg  0.4 mg Intravenous Q2 Min PRN Domitila Tierney NP        Or     naloxone (NARCAN) injection 0.2 mg  0.2 mg Intramuscular Q2 Min PRN Domitila Tierney NP        Or     naloxone (NARCAN) injection 0.4 mg  0.4 mg Intramuscular Q2 Min PRN Domitila Tierney NP         ondansetron (ZOFRAN ODT) ODT tab 4 mg  4 mg Oral Q12H PRN Domitila Tierney NP         oxyCODONE (ROXICODONE) tablet 5 mg  5 mg Oral Q4H PRN Umair Jung MD   5 mg at 10/07/24 1413     oxyCODONE IR (ROXICODONE) half-tab 2.5 mg  2.5 mg Oral Q4H PRN Domitila Tierney NP   2.5 mg at 10/08/24 0432     predniSONE (DELTASONE) tablet 5 mg  5 mg Oral Daily Domitila Tierney NP   5 mg at 10/08/24 0932     senna-docusate (SENOKOT-S/PERICOLACE) 8.6-50 MG per tablet 1 tablet  1 tablet Oral BID PRN Domitila Tierney NP        Or     senna-docusate (SENOKOT-S/PERICOLACE) 8.6-50 MG per tablet 2 tablet  2 tablet Oral BID PRN Domitila Tierney NP         sodium chloride (PF) 0.9% PF flush 3 mL  3 mL Intracatheter Q8H Domitila Tierney NP   3 mL at 10/08/24 0622     sodium chloride (PF) 0.9% PF flush 3 mL  3 mL Intracatheter q1 min prn Domitila Tierney NP         No current Psychiatric-ordered outpatient medications on file.       Cardiographics:    Telemetry: Atrial fibrillation 45-70    Echocardiogram:   The visual ejection fraction is 60-65%. No regional wall motion abnormalities  noted.  Mildly decreased right  "ventricular systolic function  Mild left atrial enlargement  There is mild to moderate (1-2+) mitral regurgitation.  There is mild to moderate (1-2+) aortic regurgitation.  There is mild to moderate (1-2+) tricuspid regurgitation.  The right ventricular systolic pressure is approximated at 55 mmHg.       Lab Results    Chemistry/lipid CBC Cardiac Enzymes/BNP/TSH/INR   No results for input(s): \"CHOL\", \"HDL\", \"LDL\", \"TRIG\", \"CHOLHDLRATIO\" in the last 15221 hours.  No results for input(s): \"LDL\" in the last 82596 hours.  Recent Labs   Lab Test 10/07/24  0420 10/06/24  1403 10/06/24  0804   NA  --   --  138   POTASSIUM 4.0   < > 3.3*   CHLORIDE  --   --  102   CO2  --   --  27   GLC  --   --  98   BUN  --   --  9.0   CR  --   --  0.67   GFRESTIMATED  --   --  80   MAYRA  --   --  8.4*    < > = values in this interval not displayed.     Recent Labs   Lab Test 10/06/24  0804 10/04/24  0440 10/03/24  1128   CR 0.67 0.62 0.61     No results for input(s): \"A1C\" in the last 81979 hours.       Recent Labs   Lab Test 10/07/24  0942 10/06/24  0804   WBC  --  7.8   HGB 9.8* 8.7*   HCT  --  28.0*   MCV  --  91   PLT  --  350     Recent Labs   Lab Test 10/07/24  0942 10/06/24  0804 10/05/24  1002   HGB 9.8* 8.7* 10.3*    No results for input(s): \"TROPONINI\" in the last 48739 hours.  No results for input(s): \"BNP\", \"NTBNPI\", \"NTBNP\" in the last 19115 hours.  No results for input(s): \"TSH\" in the last 92786 hours.  No results for input(s): \"INR\" in the last 43304 hours.                "

## 2024-10-09 VITALS
HEIGHT: 60 IN | OXYGEN SATURATION: 94 % | SYSTOLIC BLOOD PRESSURE: 130 MMHG | HEART RATE: 76 BPM | RESPIRATION RATE: 18 BRPM | BODY MASS INDEX: 36.01 KG/M2 | TEMPERATURE: 97.7 F | DIASTOLIC BLOOD PRESSURE: 59 MMHG | WEIGHT: 183.4 LBS

## 2024-10-09 LAB — POTASSIUM SERPL-SCNC: 4.1 MMOL/L (ref 3.4–5.3)

## 2024-10-09 PROCEDURE — 250N000013 HC RX MED GY IP 250 OP 250 PS 637: Performed by: INTERNAL MEDICINE

## 2024-10-09 PROCEDURE — 84132 ASSAY OF SERUM POTASSIUM: CPT | Performed by: INTERNAL MEDICINE

## 2024-10-09 PROCEDURE — 36415 COLL VENOUS BLD VENIPUNCTURE: CPT | Performed by: INTERNAL MEDICINE

## 2024-10-09 PROCEDURE — 250N000013 HC RX MED GY IP 250 OP 250 PS 637

## 2024-10-09 PROCEDURE — 99239 HOSP IP/OBS DSCHRG MGMT >30: CPT | Performed by: INTERNAL MEDICINE

## 2024-10-09 PROCEDURE — 250N000012 HC RX MED GY IP 250 OP 636 PS 637

## 2024-10-09 RX ORDER — DILTIAZEM HYDROCHLORIDE 30 MG/1
30 TABLET, FILM COATED ORAL EVERY 8 HOURS
Qty: 90 TABLET | Refills: 0 | Status: SHIPPED | OUTPATIENT
Start: 2024-10-09 | End: 2024-10-09

## 2024-10-09 RX ORDER — METOPROLOL TARTRATE 100 MG/1
100 TABLET ORAL 2 TIMES DAILY
Qty: 60 TABLET | Refills: 0 | Status: SHIPPED | OUTPATIENT
Start: 2024-10-09 | End: 2024-10-09

## 2024-10-09 RX ORDER — DILTIAZEM HYDROCHLORIDE 30 MG/1
30 TABLET, FILM COATED ORAL EVERY 8 HOURS
DISCHARGE
Start: 2024-10-09

## 2024-10-09 RX ORDER — METOPROLOL TARTRATE 100 MG/1
100 TABLET ORAL 2 TIMES DAILY
DISCHARGE
Start: 2024-10-09 | End: 2024-11-04

## 2024-10-09 RX ADMIN — DILTIAZEM HYDROCHLORIDE 30 MG: 30 TABLET, FILM COATED ORAL at 05:42

## 2024-10-09 RX ADMIN — METOPROLOL TARTRATE 100 MG: 25 TABLET, FILM COATED ORAL at 08:10

## 2024-10-09 RX ADMIN — ACETAMINOPHEN 650 MG: 325 TABLET ORAL at 09:16

## 2024-10-09 RX ADMIN — DOCUSATE SODIUM 100 MG: 100 CAPSULE, LIQUID FILLED ORAL at 08:10

## 2024-10-09 RX ADMIN — PREDNISONE 5 MG: 5 TABLET ORAL at 08:10

## 2024-10-09 RX ADMIN — CLOPIDOGREL BISULFATE 75 MG: 75 TABLET ORAL at 08:10

## 2024-10-09 ASSESSMENT — ACTIVITIES OF DAILY LIVING (ADL)
ADLS_ACUITY_SCORE: 41

## 2024-10-09 NOTE — PLAN OF CARE
Occupational Therapy Discharge Summary    Reason for therapy discharge:    Discharged to transitional care facility.    Progress towards therapy goal(s). See goals on Care Plan in [facility] electronic health record for goal details.  Goals partially met.  Barriers to achieving goals:   discharge from facility.    Therapy recommendation(s):    Continued therapy is recommended.  Rationale/Recommendations:  Cont OT in TCU setting to progress IND w ADLs as well as activity tolerance.    ANDREEA Lyle/L. 10/9/2024, 10:39 AM

## 2024-10-09 NOTE — PROGRESS NOTES
"Care Management Discharge Note    Discharge Date: 10/09/2024       Discharge Disposition: Transitional Care - [facility]    Discharge Services: Transitional Care - [facility]    Discharge DME: None    Discharge Transportation: family or friend will provide    Private pay costs discussed: Not applicable    Does the patient's insurance plan have a 3 day qualifying hospital stay waiver?  No    PAS Confirmation Code: [id number]  Patient/family educated on Medicare website which has current facility and service quality ratings: yes    Education Provided on the Discharge Plan: Yes per team  Persons Notified of Discharge Plans: Patient  Patient/Family in Agreement with the Plan: yes    Handoff Referral Completed: No, handoff not indicated or clinically appropriate    Additional Information:  Patient discharge to [facility]. Family will transport around 11:30 am.    Orders sent to [facility]    Ellen Barber RN     Met with patient and her daughters at bedside. The daughter Meera said she preferred RNCM call [facility] to check on bed availability. Meera prefer [facility].  Patient said she doesn't mind going to [facility].  Meera said it's inconvenient for her to visit patient if she goes to [facility]. Patient said, \"OK, then I'll go to [facility].    Called Susan at [facility] and left a message    [phone] Susan called back from [facility], \"We do have a bed available today. Private room fee of $25 or shared room at no extra charge. Patient can not do cancer treatment or PET scan until after the TCU stay.\"    Updated patient and her daughters. Meera said they'll take the private room with fee, but want to know if patient can see the cancer doctor tomorrow?    Called Susan and she said patient can leave the TCU to go to the cancer clinic, but no initiation of cancer treatment and PET scan due to cost. If patient would like " to come, she can run the [employer] auth and patient can arrive by 1700.    Updated patient and her daughters. They would like to cancel [facility] and go with [facility].  Called to update Susan  Called to update [facility]  Marlin to update bedside nurseBrissa    [phone] called Susan and left a message to get an update on [employer] auth    1349 Susan called and said she got the auth. Patient can arrive before 1700 today. She received the discharge orders.  Called to update Meera Isaac to update bedside nurseBrissa

## 2024-10-09 NOTE — PLAN OF CARE
Goal Outcome Evaluation:       Discharge to TCU family transported, given paper work and family packed her belongings

## 2024-10-09 NOTE — PLAN OF CARE
Problem: Dysrhythmia  Goal: Normalized Cardiac Rhythm  Outcome: Progressing   Goal Outcome Evaluation:    Afib - HR controlled. HR 60 to 80's- on scheduled Diltiazem   Denies any pain or discomfort.

## 2024-10-09 NOTE — DISCHARGE SUMMARY
[facility]  Hospitalist Discharge Summary      Date of Admission:  10/3/2024  Date of Discharge:  10/9/2024  Discharging Provider: Yesenia Nunes MD  Discharge Service: Hospitalist Service    Discharge Diagnoses   Chest pain resolved  Atrial fibrillation  Rapid ventricular response- resolved  Colonic mass possibly malignant  Abdominal pain, improved  Left adnexal mass  Chronic anemia  Hypertension  Hyponatremia    Clinically Significant Risk Factors     # Obesity: Estimated body mass index is 35.82 kg/m  as calculated from the following:    Height as of this encounter: 1.524 m (5').    Weight as of this encounter: 83.2 kg (183 lb 6.4 oz).       Follow-ups Needed After Discharge   Follow-up Appointments     Follow Up and recommended labs and tests      Follow up with Nursing home physician.  No follow up labs or test are   needed.            Unresulted Labs Ordered in the Past 30 Days of this Admission       No orders found from 9/3/2024 to 10/4/2024.        These results will be followed up by Yesenia Nunes    Discharge Disposition   Discharged to short-term care facility  Condition at discharge: Stable    Hospital Course   Susan Fair is a 89 year old female with history of hypertension, JAGDISH, hypercholesterolemia and prior CVA admitted due to chest pain and atrial fibrillation RVR. Patient was discharged home a day PTA from hospital where she found to have colonic mass, likely malignant with 4.2 cm left adnexal cystic lesion and prominent mesenteric lymph node. The patient reports when she awoke in the morning she felt fine, but for 1-2 hours she was having centralized chest pain. Patient noted that she was coughing up phlegm this morning, and that after coughing she felt better.  EKG obtained in the ER patient in atrial fibrillation RVR rate in 130s.  Patient given 3 doses of IV metoprolol slowed heart rate down to 100s.       Chest pain- resolved  Atrial fibrillation- now rate  controlled  RVR- resolved  -EKG obtained and reviewed. Rate 130-120  -IV metoprolol 5 mg x 3 given in ED  -Metoprolol IV 5 mg as needed for heart rate above 110  -Troponin 19-->18  -Cardiac telemetry monitor  -Cardiology consulted. Elevated CHADS-Vasc score noted and discussed with patient/family. Given her age, tendency for fall. Anemia, colon cancer, patient is deemed not a good candidate for AC.  -Metoprolol oral started 10/3. Dose increased 10/04, increased further to 100mg BID 10/05. Dilt 60mg TID started 10/07 dose decreased to 30 mg 3 times daily 10/8  -Losartan and hydrochlorothiazide discontinued to allow for better rate control     Colonic mass-possibly malignant  Abdominal pain, improved  -CT scan of the abdomen and pelvis showed irregular masslike wall thickening of the hepatic flexure with adjacent inflammatory changes, suspicious for primary colonic neoplasm for which -Colonoscopy completed, with biopsy  -Biopsy report discussed with the patient/family. It showed INVASIVE WELL TO MODERATELY DIFFERENTIATED ADENOCARCINOMA WITH FOCAL MUCINOUS DIFFERENTIATION   -Patient/family discussed the treatment options with Dr Gomez from [facility] 10/04. Options discussed were right hemicolectomy vs immunotherapy vs hospice type of care. Dr Gomez also recommended getting oncologist's input. Oncology consulted. Patient can follow up with Dr Gomez in the clinic on 10/10/24 to discuss the treatment plan further  -Patient/family seems inclined towards immunotherapy; however if they choose surgery, will need cardiac clearance per [facility].     Adnexal mass, left  -Patient/family request further work up with pelvis ultrasound as recommended by radiologist. Pelvis ultrasound done on 10/04; findings were consistent with ovarian cyst. Recommendation is to repeat pelvis ultrasound in 6 months    Anemia, chronic   Possible Melena  -Hemoglobin is more or less stable  -Patient reports black stools for quite some time. Unclear if its due to  iron pills or low grade oozing from the malignancy. Given IV iron 200mg x 3.   -Continue oral supplementation at discharge    Hypertension  -Monitor blood pressure, stable at this time  -Blood pressure controlled with diltiazem and metoprolol as above    Concern for PMR-unlikely  -Patient was taking prednisone PTA for possible PMR.  Patient was evaluated by rheumatologist who felt diagnosis of PMR is unlikely and recommends tapering off prednisone.  -Taper prednisone to 2.5 mg daily      Hyponatremia-resolved  Sodium improved    Consultations This Hospital Stay   COLORECTAL SURGERY IP CONSULT  CARDIOLOGY IP CONSULT  HEMATOLOGY & ONCOLOGY IP CONSULT  PHYSICAL THERAPY ADULT IP CONSULT  OCCUPATIONAL THERAPY ADULT IP CONSULT  CARE MANAGEMENT / SOCIAL WORK IP CONSULT  PHYSICAL THERAPY ADULT IP CONSULT  OCCUPATIONAL THERAPY ADULT IP CONSULT    Code Status   Full Code    Time Spent on this Encounter   I, Yesenia Nunes MD, personally saw the patient today and spent greater than 30 minutes discharging this patient.       Yesenia Nunes MD  [facility]  [address] [place] [id number]  Phone: [phone]  Fax: [phone]  ______________________________________________________________________    Physical Exam   Vital Signs: Temp: 97.7  F (36.5  C) Temp src: Oral BP: 130/59 Pulse: 76   Resp: 18 SpO2: 94 % O2 Device: None (Room air)    Weight: 183 lbs 6.4 oz  General Appearance: Awake, alert, in no acute distress  Respiratory: CTAB, no wheeze  Cardiovascular: Irregular rhythm, regular rate  GI: soft, nontender, non distended, normal bowel sounds  Skin: no jaundice, no rash         Primary Care Physician   [facility]    Discharge Orders      General info for SNF    Length of Stay Estimate: Short Term Care: Estimated # of Days <30  Condition at Discharge: Improving  Level of care:skilled   Rehabilitation Potential: Fair  Admission H&P remains valid and  up-to-date: Yes  Recent Chemotherapy: N/A  Use Nursing Home Standing Orders: Yes     Mantoux instructions    Give two-step Mantoux (PPD) Per Facility Policy Yes     Follow Up and recommended labs and tests    Follow up with Nursing home physician.  No follow up labs or test are needed.     Reason for your hospital stay    Atrial fibrillation with rapid ventricular response, ascending colon mass     Activity - Up with assistive device     Activity - Up with nursing assistance     Physical Therapy Adult Consult    Evaluate and treat as clinically indicated.    Reason:  gait, therapeutic exercise     Occupational Therapy Adult Consult    Evaluate and treat as clinically indicated.    Reason:  ADLs     Fall precautions     Wheelchair Order     Diet    Follow this diet upon discharge: Regular Diet Adult       Significant Results and Procedures   Most Recent 3 CBC's:  Recent Labs   Lab Test 10/07/24  0942 10/06/24  0804 10/05/24  1002 10/04/24  0440 10/03/24  1257   WBC  --  7.8  --  8.0 9.1   HGB 9.8* 8.7* 10.3* 9.0* 8.7*   MCV  --  91  --  91 91   PLT  --  350  --  334 286     Most Recent 3 BMP's:  Recent Labs   Lab Test 10/09/24  0415 10/08/24  0412 10/07/24  0420 10/06/24  1403 10/06/24  0804 10/04/24  0440 10/03/24  1128   NA  --   --   --   --  138 138 132*   POTASSIUM 4.1 4.1 4.0   < > 3.3* 3.8 3.7   CHLORIDE  --   --   --   --  102 102 97*   CO2  --   --   --   --  27 28 25   BUN  --   --   --   --  9.0 11.2 12.8   CR  --   --   --   --  0.67 0.62 0.61   ANIONGAP  --   --   --   --  9 8 10   MAYRA  --   --   --   --  8.4* 8.3* 8.3*   GLC  --   --   --   --  98 101* 115*    < > = values in this interval not displayed.     Most Recent 2 LFT's:  Recent Labs   Lab Test 09/30/24  0759   AST 17   ALT 14   ALKPHOS 75   BILITOTAL 0.5     Most Recent 3 Troponin's:No lab results found.  Most Recent 3 BNP's:No lab results found.,   Results for orders placed or performed during the hospital encounter of 10/03/24   Chest XR,  PA  & LAT    Narrative    EXAM: XR CHEST 2 VIEWS  LOCATION: [facility]  DATE: 10/3/2024    INDICATION: Cough, chest pain. Fast heart rate.   COMPARISON: Chest radiograph 9/5/2011.       Impression    IMPRESSION:     Lung volumes are low. Streaky opacities at the right greater than left lung bases are favored to reflect atelectasis. Small bilateral pleural effusions. Questionable minimal interstitial edema. No pneumothorax.    Normal cardiac size. Aortic atherosclerotic calcifications.   CT Chest w/o Contrast    Narrative    EXAM: CT CHEST W/O CONTRAST  LOCATION: [facility]  DATE: 10/4/2024    INDICATION: Colon cancer; staging  COMPARISON: Chest x-ray 10/3/2024, CT AP 9/30/2024  TECHNIQUE: CT chest without IV contrast. Multiplanar reformats were obtained. Dose reduction techniques were used.  CONTRAST: None.    FINDINGS: Respiratory motion artifact in the mid lung.    LUNGS AND PLEURA: There is a 4 mm nodule laterally in the lingula (axial image 152), two, 4 mm groundglass nodules in the inferior left upper lobe (axial image 104) and tiny linear opacity superior to the (axial image 94).    Patient's developed small, bilateral pleural effusions, right greater than left since last week with associated compressive atelectasis.    MEDIASTINUM/AXILLAE: There is a 8 mm right thyroid nodule. No adenopathy. Aorta and branches are normal.    CORONARY ARTERY CALCIFICATION: Moderate.    UPPER ABDOMEN: No liver lesions. Slight atrophy the pancreas.    MUSCULOSKELETAL: Exaggeration of the thoracic kyphoscoliosis. No suspicious lesions.      Impression    IMPRESSION:   1.  There is a 4 mm subpleural nodule in the lingula and two, faint groundglass nodules in the inferior left upper lobe. These are presumed to be incidental and can be followed.  2.  No other findings of concern for pulmonary metastasis.  3.  Patient's developed small, bilateral pleural effusions over the last week,  right greater than left without other findings of pulmonary edema.  4.  Incidental 8 mm right thyroid nodule which needs no follow-up given age. Reference given below.      REFERENCE:  Reagan VENCESK et al. Managing Incidental Thyroid Nodules Detected on Imaging: White Paper of the ACR Incidental Thyroid Findings Committee. JACR 2015; 12:143-150.    Incidental thyroid nodule detected on CT or MRI without suspicious findings. Applies to general population without limited life expectancy or significant comorbidities.    Age greater than or equal to 35 years  Less than 1.5 cm: No further evaluation.  Greater than or equal to 1.5 cm: Evaluate with thyroid ultrasound.     US Pelvis Complete without Transvaginal    Narrative    EXAM: US PELVIC TRANSABDOMINAL  LOCATION: [facility]  DATE: 10/4/2024    INDICATION: left adnexal mass  COMPARISON: 9/30/2024  TECHNIQUE: Transabdominal scans were performed.    FINDINGS:    UTERUS: 5.7 x 4.2 x 2.7 cm. Heterogeneous uterus. The endometrium was not well-visualized.     RIGHT OVARY: Not visualized due to overlying bowel gas.    LEFT OVARY: 4 x 2.7 x 3.1 cm cm. Normal. 4 cm left ovarian cyst.    No significant free fluid.      Impression    IMPRESSION:  1.  4 cm left ovarian cyst. Recommend follow-up as recommended below.      Postmenopausal asymptomatic simple cyst:      >3 to <=5 cm: Benign simple cyst. Clinically inconsequential finding. Follow-up pelvic ultrasound in 6 months recommended.      REFERENCE:  Management of Asymptomatic Ovarian and Other Adnexal Cysts Imaged at US: Society of Radiologists in Ultrasound Consensus Conference Statement. Radiology September 2010; [id number].    Simple Adnexal Cysts: SRU Consensus Conference Update on Follow-up and Reporting. Radiology September 2019. [id number].   Echocardiogram Complete     Value    LVEF  60-65%    Narrative    [id number]  [id number]  [id number]  [id number]^TIMMY^[facility]  [address] [place] [address]     Name: ANNITA HENLEY  MRN: [id number]  : 10/11/1928  Study Date: 10/04/2024 09:52 AM  Age: 89 yrs  Gender: Female  Patient Location: [facility]  Reason For Study: Atrial Fibrillation  Ordering Physician: NELSON WARNER  Performed By: BP     BSA: 1.8 m2  Height: 60 in  Weight: 186 lb  HR: 107  BP: 189/73 mmHg  ______________________________________________________________________________  Procedure  Complete Portable Echo Adult.  ______________________________________________________________________________  Interpretation Summary     The visual ejection fraction is 60-65%. No regional wall motion abnormalities  noted.  Mildly decreased right ventricular systolic function  Mild left atrial enlargement  There is mild to moderate (1-2+) mitral regurgitation.  There is mild to moderate (1-2+) aortic regurgitation.  There is mild to moderate (1-2+) tricuspid regurgitation.  The right ventricular systolic pressure is approximated at 55 mmHg.  ______________________________________________________________________________  Left Ventricle  The left ventricle is normal in size. There is normal left ventricular wall  thickness. The visual ejection fraction is 60-65%. Diastolic function not  assessed due to atrial fibrillation. No regional wall motion abnormalities  noted.     Right Ventricle  The right ventricle is normal size. TAPSE is abnormal, which is consistent  with abnormal right ventricular systolic function. Mildly decreased right  ventricular systolic function.     Atria  The left atrium is mildly dilated. Borderline right atrial enlargement.     Mitral Valve  Mitral valve leaflets appear normal. There is mild to moderate (1-2+) mitral  regurgitation.     Tricuspid Valve  Tricuspid valve leaflets appear normal. There is mild to moderate (1-2+)  tricuspid regurgitation. The right ventricular systolic pressure is  approximated at 40mmHg plus the right atrial pressure.     Aortic Valve  The  aortic valve is trileaflet with aortic valve sclerosis. There is mild to  moderate (1-2+) aortic regurgitation.     Pulmonic Valve  The pulmonic valve is not well seen, but is grossly normal. There is mild to  moderate (1-2+) pulmonic valvular regurgitation. Right ventricular diastolic  pressure is approximated at 8mmHg plus the right atrial pressure.     Vessels  The aorta root is normal. IVC diameter >2.1 cm collapsing <50% with sniff  suggests a high RA pressure estimated at 15 mmHg or greater.     Pericardium  There is no pericardial effusion.     ______________________________________________________________________________  MMode/2D Measurements & Calculations  IVSd: 1.1 cm  LVIDd: 3.9 cm  LVIDs: 2.3 cm  LVPWd: 0.93 cm  FS: 41.0 %     LV mass(C)d: 123.1 grams  LV mass(C)dI: 68.0 grams/m2  Ao root diam: 3.3 cm  LA dimension: 3.8 cm  LA/Ao: 1.2  LVOT diam: 1.9 cm  LVOT area: 2.8 cm2  Ao root diam index Ht(cm/m): 2.2  Ao root diam index BSA (cm/m2): 1.8  EF Biplane: 62.0 %  LA Volume (BP): 60.9 ml  LA Volume Index (BP): 33.6 ml/m2     LA Volume Indexed (AL/bp): 36.2 ml/m2  RV Base: 3.2 cm  RWT: 0.48  TAPSE: 1.3 cm     Time Measurements  MM HR: 107.0 BPM     Doppler Measurements & Calculations  MV E max luis: 122.0 cm/sec  MV dec time: 0.22 sec     Ao V2 max: 159.0 cm/sec  Ao max PG: 10.0 mmHg  Ao V2 mean: 109.0 cm/sec  Ao mean P.0 mmHg  Ao V2 VTI: 32.0 cm  SUNNY(I,D): 1.4 cm2  SUNNY(V,D): 1.4 cm2  AI P1/2t: 433.7 msec  LV V1 max P.4 mmHg  LV V1 max: 77.4 cm/sec  LV V1 VTI: 15.4 cm  MR PISA: 1.0 cm2  MR ERO: 0.08 cm2  MR volume: 11.8 ml  SV(LVOT): 43.7 ml  SI(LVOT): 24.1 ml/m2  PA acc time: 0.08 sec  PI end-d luis: 140.5 cm/sec  TR max luis: 317.0 cm/sec  TR max P.2 mmHg  AV Luis Ratio (DI): 0.49  SUNNY Index (cm2/m2): 0.75  E/E': 10.6  E/E' av.4  Lateral E/e': 10.6  Medial E/e': 12.2  Peak E' Luis: 11.5 cm/sec  RV S Luis: 13.8 cm/sec      ______________________________________________________________________________  Report approved by: Stuart Braxton 10/04/2024 02:03 PM             Discharge Medications   Current Discharge Medication List        START taking these medications    Details   diltiazem (CARDIZEM) 30 MG tablet Take 1 tablet (30 mg) by mouth every 8 hours.    Associated Diagnoses: Atrial fibrillation with RVR (H)      metoprolol tartrate (LOPRESSOR) 100 MG tablet Take 1 tablet (100 mg) by mouth 2 times daily.    Associated Diagnoses: Atrial fibrillation with RVR (H)           CONTINUE these medications which have NOT CHANGED    Details   acetaminophen (TYLENOL) 325 MG tablet Take 2 tablets (650 mg) by mouth every 8 hours as needed for mild pain.    Associated Diagnoses: Colonic mass      calcium citrate-vitamin D (CITRACAL) 200-6.25 MG-MCG TABS per tablet Take 1 tablet by mouth daily.      clopidogrel (PLAVIX) 75 MG tablet Take 75 mg by mouth daily.      docusate sodium (COLACE) 100 MG tablet Take 100 mg by mouth daily.      famotidine (PEPCID) 20 MG tablet Take 20 mg by mouth at bedtime.      ondansetron (ZOFRAN ODT) 4 MG ODT tab Take 4 mg by mouth every 12 hours as needed for nausea.      oxyCODONE (ROXICODONE) 5 MG tablet Take 0.5-1 tablets (2.5-5 mg) by mouth every 6 hours as needed for moderate to severe pain.  Qty: 15 tablet, Refills: 0    Associated Diagnoses: Colonic mass      predniSONE (DELTASONE) 2.5 MG tablet Take 2.5 mg by mouth daily.      Wheat Dextrin (BENEFIBER PO) Take 1 Dose by mouth every morning.           STOP taking these medications       amLODIPine (NORVASC) 10 MG tablet Comments:   Reason for Stopping:         ferrous sulfate (FEROSUL) 325 (65 Fe) MG tablet Comments:   Reason for Stopping:         losartan-hydrochlorothiazide (HYZAAR) 100-25 MG tablet Comments:   Reason for Stopping:             Allergies   Allergies   Allergen Reactions    Ace Inhibitors Cough

## 2024-10-09 NOTE — ADDENDUM NOTE
Addendum  created 10/09/24 0959 by Keke Moralez MD    Attestation recorded in Intraprocedure, Intraprocedure Attestations filed

## 2024-10-09 NOTE — PLAN OF CARE
Physical Therapy Discharge Summary    Reason for therapy discharge:    Discharged to transitional care facility.    Progress towards therapy goal(s). See goals on Care Plan in [facility] electronic health record for goal details.  Goals partially met.  Barriers to achieving goals:   limited tolerance for therapy and discharge from facility.    Therapy recommendation(s):    Continued therapy is recommended.  Rationale/Recommendations:  PT goals not fully met.

## 2024-10-10 ENCOUNTER — PATIENT OUTREACH (OUTPATIENT)
Dept: CARE COORDINATION | Facility: CLINIC | Age: 89
End: 2024-10-10
Payer: COMMERCIAL

## 2024-10-10 NOTE — PROGRESS NOTES
Connected Care Resource Center    Background: Transitional Care Management program identified per system criteria and reviewed by [facility] team for possible outreach.    Assessment: Upon chart review, [facility] Team member will not proceed with patient outreach related to this episode of Transitional Care Management program due to reason below:    Patient has discharged to a Memory Care, Long-term Care, Assisted Living or Group Home where patient is receiving on-site support with their daily cares, including support with hospital follow up plan.    Plan: Transitional Care Management episode addressed appropriately per reason noted above.      Yvette Sol MA  Connected Care Resource [place]    *Connected Care Resource Team does NOT follow patient ongoing. Referrals are identified based on internal discharge reports and the outreach is to ensure patient has an understanding of their discharge instructions.

## 2024-10-15 ENCOUNTER — LAB REQUISITION (OUTPATIENT)
Dept: LAB | Facility: CLINIC | Age: 89
End: 2024-10-15
Payer: COMMERCIAL

## 2024-10-15 DIAGNOSIS — D64.9 ANEMIA, UNSPECIFIED: ICD-10-CM

## 2024-10-16 ENCOUNTER — LAB REQUISITION (OUTPATIENT)
Dept: LAB | Facility: CLINIC | Age: 89
End: 2024-10-16
Payer: COMMERCIAL

## 2024-10-16 DIAGNOSIS — E87.1 HYPO-OSMOLALITY AND HYPONATREMIA: ICD-10-CM

## 2024-10-16 DIAGNOSIS — D64.9 ANEMIA, UNSPECIFIED: ICD-10-CM

## 2024-10-16 LAB
ANION GAP SERPL CALCULATED.3IONS-SCNC: 7 MMOL/L (ref 7–15)
BASOPHILS # BLD AUTO: 0 10E3/UL (ref 0–0.2)
BASOPHILS NFR BLD AUTO: 0 %
BUN SERPL-MCNC: 11.7 MG/DL (ref 8–23)
CALCIUM SERPL-MCNC: 8.8 MG/DL (ref 8.8–10.4)
CHLORIDE SERPL-SCNC: 97 MMOL/L (ref 98–107)
CREAT SERPL-MCNC: 0.69 MG/DL (ref 0.51–0.95)
EGFRCR SERPLBLD CKD-EPI 2021: 79 ML/MIN/1.73M2
EOSINOPHIL # BLD AUTO: 0 10E3/UL (ref 0–0.7)
EOSINOPHIL NFR BLD AUTO: 0 %
ERYTHROCYTE [DISTWIDTH] IN BLOOD BY AUTOMATED COUNT: 15.9 % (ref 10–15)
GLUCOSE SERPL-MCNC: 101 MG/DL (ref 70–99)
HCO3 SERPL-SCNC: 27 MMOL/L (ref 22–29)
HCT VFR BLD AUTO: 26.4 % (ref 35–47)
HGB BLD-MCNC: 7.9 G/DL (ref 11.7–15.7)
IMM GRANULOCYTES # BLD: 0.1 10E3/UL
IMM GRANULOCYTES NFR BLD: 1 %
LYMPHOCYTES # BLD AUTO: 1.7 10E3/UL (ref 0.8–5.3)
LYMPHOCYTES NFR BLD AUTO: 16 %
MCH RBC QN AUTO: 28.4 PG (ref 26.5–33)
MCHC RBC AUTO-ENTMCNC: 29.9 G/DL (ref 31.5–36.5)
MCV RBC AUTO: 95 FL (ref 78–100)
MONOCYTES # BLD AUTO: 1 10E3/UL (ref 0–1.3)
MONOCYTES NFR BLD AUTO: 10 %
NEUTROPHILS # BLD AUTO: 7.4 10E3/UL (ref 1.6–8.3)
NEUTROPHILS NFR BLD AUTO: 73 %
NRBC # BLD AUTO: 0 10E3/UL
NRBC BLD AUTO-RTO: 0 /100
PLATELET # BLD AUTO: 456 10E3/UL (ref 150–450)
POTASSIUM SERPL-SCNC: 4.6 MMOL/L (ref 3.4–5.3)
RBC # BLD AUTO: 2.78 10E6/UL (ref 3.8–5.2)
SODIUM SERPL-SCNC: 131 MMOL/L (ref 135–145)
WBC # BLD AUTO: 10.1 10E3/UL (ref 4–11)

## 2024-10-16 PROCEDURE — 85025 COMPLETE CBC W/AUTO DIFF WBC: CPT | Mod: ORL | Performed by: INTERNAL MEDICINE

## 2024-10-16 PROCEDURE — 36415 COLL VENOUS BLD VENIPUNCTURE: CPT | Mod: ORL | Performed by: INTERNAL MEDICINE

## 2024-10-16 PROCEDURE — 80048 BASIC METABOLIC PNL TOTAL CA: CPT | Mod: ORL | Performed by: INTERNAL MEDICINE

## 2024-10-16 PROCEDURE — P9604 ONE-WAY ALLOW PRORATED TRIP: HCPCS | Mod: ORL | Performed by: INTERNAL MEDICINE

## 2024-10-18 ENCOUNTER — LAB REQUISITION (OUTPATIENT)
Dept: LAB | Facility: CLINIC | Age: 89
End: 2024-10-18
Payer: COMMERCIAL

## 2024-10-18 DIAGNOSIS — C18.2 MALIGNANT NEOPLASM OF ASCENDING COLON (H): ICD-10-CM

## 2024-10-18 DIAGNOSIS — C19 MALIGNANT NEOPLASM OF RECTOSIGMOID JUNCTION (H): ICD-10-CM

## 2024-10-18 LAB
HGB BLD-MCNC: 8.3 G/DL (ref 11.7–15.7)
SODIUM SERPL-SCNC: 129 MMOL/L (ref 135–145)

## 2024-10-18 PROCEDURE — 36415 COLL VENOUS BLD VENIPUNCTURE: CPT | Mod: ORL | Performed by: INTERNAL MEDICINE

## 2024-10-18 PROCEDURE — P9604 ONE-WAY ALLOW PRORATED TRIP: HCPCS | Mod: ORL | Performed by: INTERNAL MEDICINE

## 2024-10-18 PROCEDURE — 85018 HEMOGLOBIN: CPT | Mod: ORL | Performed by: INTERNAL MEDICINE

## 2024-10-18 PROCEDURE — 84295 ASSAY OF SERUM SODIUM: CPT | Mod: ORL | Performed by: INTERNAL MEDICINE

## 2024-10-21 LAB — HGB BLD-MCNC: 8.4 G/DL (ref 11.7–15.7)

## 2024-10-21 PROCEDURE — P9604 ONE-WAY ALLOW PRORATED TRIP: HCPCS | Mod: ORL | Performed by: INTERNAL MEDICINE

## 2024-10-21 PROCEDURE — 36415 COLL VENOUS BLD VENIPUNCTURE: CPT | Mod: ORL | Performed by: INTERNAL MEDICINE

## 2024-10-21 PROCEDURE — 85018 HEMOGLOBIN: CPT | Mod: ORL | Performed by: INTERNAL MEDICINE

## 2024-11-04 ENCOUNTER — OFFICE VISIT (OUTPATIENT)
Dept: CARDIOLOGY | Facility: CLINIC | Age: 89
End: 2024-11-04
Payer: COMMERCIAL

## 2024-11-04 VITALS
DIASTOLIC BLOOD PRESSURE: 59 MMHG | WEIGHT: 184 LBS | RESPIRATION RATE: 16 BRPM | SYSTOLIC BLOOD PRESSURE: 96 MMHG | HEART RATE: 85 BPM | OXYGEN SATURATION: 94 % | BODY MASS INDEX: 35.94 KG/M2

## 2024-11-04 DIAGNOSIS — I48.91 ATRIAL FIBRILLATION WITH RVR (H): ICD-10-CM

## 2024-11-04 PROCEDURE — 99204 OFFICE O/P NEW MOD 45 MIN: CPT | Performed by: STUDENT IN AN ORGANIZED HEALTH CARE EDUCATION/TRAINING PROGRAM

## 2024-11-04 RX ORDER — METOPROLOL TARTRATE 100 MG/1
50 TABLET ORAL 2 TIMES DAILY
Qty: 90 TABLET | Refills: 3 | Status: SHIPPED | OUTPATIENT
Start: 2024-11-04

## 2024-11-04 NOTE — PROGRESS NOTES
Cardiology Clinic    Susan Fair is a 89 year old with a history of newly diagnosed atrial fibrillation, newly diagnosed colon cancer, hypertension, stroke, hyperlipidemia, JAGDISH who presents for evaluation of atrial fibrillation.    Admitted from 10/3 to 10/9/2024 at [facility] for chest pain and shortness of breath, found to be in atrial fibrillation treated with metoprolol and diltiazem.  During the stay she was also found to have a colonic mass which was biopsied and showed a moderately differentiated adenocarcinoma.  She is seeing oncology about potential immunotherapy.  She was given IV iron for anemia during his stay.    Today, she feels weak.  She has had weakness for some time.  She denies any chest pain, palpitations.  She does feel somewhat dizzy when standing.  She is currently living independently and enjoys walking down to the laundry room or using her cart to do her laundry.    We discussed Susan's overall care today with her daughters.  She stated that in the event of a cardiac arrest, she would not want chest compressions, intubation, etc.  She would like to be DO NOT RESUSCITATE.  I recommended that she at least speak to a palliative care doctor regarding management of pain, fatigue, weakness.  There may be some pharmacologic options to help her feel better.    General: Well appearing, appears stated age.  CV: Normal rate and rhythm. No m/r/g. No JVD.   Pulm: Normal effort. Normal breath sounds.  Lower extremities: No lower extremity edema.    Labs:   Reviewed in epic.  Creatinine 0.69  Hemoglobin 7.9  Platelets 456    Testing:  TTE 10/3/2024: EF 60 to 65%, mild to moderate MR, AR, and TR.  Elevated RVSP at 55 mmHg   EKG 10/3/2024: A-fib with RVR, no significant ST or T wave abnormalities (my interpretation)    Assessment and Plan:    1.  Atrial fibrillation.  [id number] 6 (2 for age, female (1), hypertension, stroke (2)).  Not anticoagulation due to fears for falls and associated bleeding.  This is  reasonable.  She is well rate controlled.  Will stop Plavix given her bleeding and decrease her metoprolol to 50 mg twice daily.    2.  Hypertension.  Blood pressure at goal today.    3.  Goals of care.  We discussed her overall care and potential therapy for her cancer.  The patient states that she values most being pain-free.  I suggested she speak to palliative care doctor which she is amenable to.  She also wants to be DO NOT RESUSCITATE.    4.  Weakness.  I suspect this is due to her iron deficiency anemia.  She is planning to get iron transfusions, she may need a blood transfusion as well.  Management per her oncologist.    Return if symptoms worsen or fail to improve.    Yovani Lei MD  Interventional Cardiology

## 2024-11-04 NOTE — PATIENT INSTRUCTIONS
Decrease metoprolol to 50 mg twice daily. Goal HR <110.    STOP clopidogrel.    I think that your weakness is due to your low blood counts.    I have sent a referral to palliative care.

## 2024-11-04 NOTE — LETTER
11/4/2024    [facility]  [address]  [facility] [address]    RE: Susan Fair       Dear Colleague,     I had the pleasure of seeing Susan Fair in the [facility].  Cardiology Clinic    Susan Fair is a 89 year old with a history of newly diagnosed atrial fibrillation, newly diagnosed colon cancer, hypertension, stroke, hyperlipidemia, JAGDISH who presents for evaluation of atrial fibrillation.    Admitted from 10/3 to 10/9/2024 at [facility] for chest pain and shortness of breath, found to be in atrial fibrillation treated with metoprolol and diltiazem.  During the stay she was also found to have a colonic mass which was biopsied and showed a moderately differentiated adenocarcinoma.  She is seeing oncology about potential immunotherapy.  She was given IV iron for anemia during his stay.    Today, she feels weak.  She has had weakness for some time.  She denies any chest pain, palpitations.  She does feel somewhat dizzy when standing.  She is currently living independently and enjoys walking down to the laundry room or using her cart to do her laundry.    We discussed Susan's overall care today with her daughters.  She stated that in the event of a cardiac arrest, she would not want chest compressions, intubation, etc.  She would like to be DO NOT RESUSCITATE.  I recommended that she at least speak to a palliative care doctor regarding management of pain, fatigue, weakness.  There may be some pharmacologic options to help her feel better.    General: Well appearing, appears stated age.  CV: Normal rate and rhythm. No m/r/g. No JVD.   Pulm: Normal effort. Normal breath sounds.  Lower extremities: No lower extremity edema.    Labs:   Reviewed in epic.  Creatinine 0.69  Hemoglobin 7.9  Platelets 456    Testing:  TTE 10/3/2024: EF 60 to 65%, mild to moderate MR, AR, and TR.  Elevated RVSP at 55 mmHg   EKG 10/3/2024: A-fib with RVR, no significant ST or T wave abnormalities  (my interpretation)    Assessment and Plan:    1.  Atrial fibrillation.  [id number] 6 (2 for age, female (1), hypertension, stroke (2)).  Not anticoagulation due to fears for falls and associated bleeding.  This is reasonable.  She is well rate controlled.  Will stop Plavix given her bleeding and decrease her metoprolol to 50 mg twice daily.    2.  Hypertension.  Blood pressure at goal today.    3.  Goals of care.  We discussed her overall care and potential therapy for her cancer.  The patient states that she values most being pain-free.  I suggested she speak to palliative care doctor which she is amenable to.  She also wants to be DO NOT RESUSCITATE.    4.  Weakness.  I suspect this is due to her iron deficiency anemia.  She is planning to get iron transfusions, she may need a blood transfusion as well.  Management per her oncologist.    Return if symptoms worsen or fail to improve.    Yovani Lei MD  Interventional Cardiology      Thank you for allowing me to participate in the care of your patient.      Sincerely,     Yovani Lei MD     [facility]  cc:   Yovani Lei MD  [address] [place] [address]

## 2024-11-12 ENCOUNTER — LAB REQUISITION (OUTPATIENT)
Dept: LAB | Facility: CLINIC | Age: 89
End: 2024-11-12
Payer: COMMERCIAL

## 2024-11-12 DIAGNOSIS — D64.9 ANEMIA, UNSPECIFIED: ICD-10-CM

## (undated) DEVICE — TUBING SUCTION MEDI-VAC 1/4"X20' N620A

## (undated) DEVICE — SOL WATER IRRIG 1000ML BOTTLE 2F7114

## (undated) DEVICE — ENDO SNARE EXACTO COLD 9MM LOOP 2.4MMX230CM 00711115

## (undated) DEVICE — FORCEP BIOPSY 2.3MM DISP COATED 000388

## (undated) DEVICE — SUCTION MANIFOLD NEPTUNE 2 SYS 1 PORT 702-025-000

## (undated) DEVICE — NDL SCLEROTHERAPY 25GA CARR-LOCK  00711811

## (undated) RX ORDER — PROPOFOL 10 MG/ML
INJECTION, EMULSION INTRAVENOUS
Status: DISPENSED
Start: 2024-10-01